# Patient Record
Sex: FEMALE | Race: WHITE | NOT HISPANIC OR LATINO | Employment: UNEMPLOYED | URBAN - METROPOLITAN AREA
[De-identification: names, ages, dates, MRNs, and addresses within clinical notes are randomized per-mention and may not be internally consistent; named-entity substitution may affect disease eponyms.]

---

## 2017-01-25 ENCOUNTER — OFFICE VISIT (OUTPATIENT)
Dept: NEUROSURGERY | Facility: CLINIC | Age: 52
End: 2017-01-25

## 2017-01-25 VITALS
SYSTOLIC BLOOD PRESSURE: 116 MMHG | DIASTOLIC BLOOD PRESSURE: 73 MMHG | BODY MASS INDEX: 52.87 KG/M2 | HEART RATE: 96 BPM | WEIGHT: 280 LBS | HEIGHT: 61 IN

## 2017-01-25 DIAGNOSIS — R20.2 NUMBNESS AND TINGLING IN LEFT ARM: ICD-10-CM

## 2017-01-25 DIAGNOSIS — M50.322 DEGENERATION OF INTERVERTEBRAL DISC AT C5-C6 LEVEL: Primary | ICD-10-CM

## 2017-01-25 DIAGNOSIS — R20.0 NUMBNESS AND TINGLING IN LEFT ARM: ICD-10-CM

## 2017-01-25 PROCEDURE — 99243 OFF/OP CNSLTJ NEW/EST LOW 30: CPT | Performed by: NEUROLOGICAL SURGERY

## 2017-01-25 NOTE — PATIENT INSTRUCTIONS
Obesidad  (Obesity)  Se considera obesidad cuando hay demasiada grasa corporal y el índice de masa corporal (IMC) es de 30 o más. El IMC es la estimación de la grasa corporal y se calcula a partir de la altura y el peso. Generalmente, es el médico el que calcula el índice de masa corporal flory las visitas de control habituales. La obesidad se produce cuando se consumen más calorías que las que se gastan realizando ejercicios o actividad física todos los consuelo. Cuando la obesidad es prolongada, puede causar enfermedades graves o emergencias, panfilo:  · Ictus.  · Cardiopatía.  · Diabetes.  · Cáncer.  · Artritis.  · Presión arterial elevada (hipertensión arterial).  · Colesterol elevado.  · Apnea del sueño.  · Disfunción eréctil.  · Problemas de infertilidad.  CAUSAS   · Pandora habitualmente alimentos poco saludables.  · Falta de actividad física.  · Ciertos trastornos, por ejemplo, derrick actividad de la tiroides (hipotiroidismo), síndrome de Cushing y síndrome de ovario poliquístico.  · Ciertos medicamentos, panfilo los corticoides, algunos antidepresivos y antipsicóticos.  · Factores genéticos.  · Falta de sueño.  DIAGNÓSTICO  El médico podrá diagnosticar obesidad después de calcular el IMC. La obesidad se diagnostica cuando el IMC es de 30 o más.  Hay otros métodos para medir el nivel de obesidad. Otros métodos consisten en medir el grosor de un pliegue de piel o la circunferencia de la cintura, y comparar la circunferencia de la cadera con la circunferencia de la cintura.  TRATAMIENTO   Un programa de tratamiento saludable incluye todos o algunos de los siguientes pasos:  · Cambios en la alimentación a aurelio plazo.  · Ejercicios y actividad física.  · Cambios en la conducta y en el estilo de syed.  · Medicamentos (solo con la supervisión de verdugo médico). Los medicamentos pueden ser útiles, kalee solamente si se usan junto con programas de dieta y ejercicio.  Si el índice de masa corporal es 40 o más, el médico puede  recomendar que se someta a julianne cirugía especializada o que participe en programas que lo ayuden a adelgazar.  Un programa poco saludable incluye:  · Ayunar.  · Dietas de moda.  · Suplementos y fármacos.  Estas elecciones no son exitosas para un control del peso a aurelio plazo.  INSTRUCCIONES PARA EL CUIDADO EN EL HOGAR  · Mary ejercicio y actividad física según las indicaciones de verdugo médico. Para aumentar la actividad física, trate lo siguiente:    Utilice las escaleras en lugar del ascensor.    Estacione lejos de la entrada de las tiendas.    Arregle el jardíndakota en bicicleta o camine en vez de mirar televisión o usar la computadora.  · Consuma alimentos y bebidas saludables, bajos en calorías, de manera habitual. Coma más frutas y verduras frescas. Utilice un recetario de alimentos de bajas calorías o tome clases de cocina saludable.  · Limite las comidas rápidas, los dulces y los snacks procesados.  · Coma porciones más pequeñas.  · Lleve un diario de todo lo que come. Hay muchas páginas web gratuitas que podrán ayudarlo. Puede ser útil que mida los alimentos de modo que pueda determinar si está consumiendo el tamaño adecuado de las porciones.  · Evite el consumo alcohol. Kalli más agua y bebidas sin calorías.  · [place] vitaminas y suplementos solamente panfilo se lo haya recomendado verdugo médico.  · También pueden ser de gran ayuda los grupos de apoyo para perder peso y la orientación de un nutricionista matriculado, un terapeuta y un programa de educación para reducir el estrés.  SOLICITE ATENCIÓN MÉDICA DE INMEDIATO SI:  · Siente dolor u opresión en el pecho.  · Siente dificultad para respirar o le falta el aire.  · Siente debilidad o adormecimiento en las piernas.  · Se siente confundido o tiene dificultad para hablar.  · Tiene cambios repentinos en la visión.     Esta información no tiene panfilo fin reemplazar el consejo del médico. Asegúrese de hacerle al médico cualquier pregunta que tenga.     Document Released:  12/18/2006 Document Revised: 01/08/2016  Elsevier Interactive Patient Education ©2016 Elsevier Inc.

## 2017-01-25 NOTE — MR AVS SNAPSHOT
Candice Swan   1/25/2017 2:00 PM   Office Visit    Dept Phone:  [phone]   Encounter #:  [id number]    Provider:  David Norton MD   Department:  [facility] NEUROSURGERY                Your Full Care Plan              Your Updated Medication List      Notice  As of 1/25/2017  4:41 PM    You have not been prescribed any medications.            You Were Diagnosed With        Codes Comments    Degeneration of intervertebral disc at C5-C6 level    -  Primary ICD-10-CM: M50.322  ICD-9-CM: 722.4     Numbness and tingling in left arm     ICD-10-CM: R20.0, R20.2  ICD-9-CM: 782.0       Instructions    Obesidad  (Obesity)  Se considera obesidad cuando hay demasiada grasa corporal y el índice de masa corporal (IMC) es de 30 o más. El IMC es la estimación de la grasa corporal y se calcula a partir de la altura y el peso. Generalmente, es el médico el que calcula el índice de masa corporal flory las visitas de control habituales. La obesidad se produce cuando se consumen más calorías que las que se gastan realizando ejercicios o actividad física todos los consuelo. Cuando la obesidad es prolongada, puede causar enfermedades graves o emergencias, panfilo:  · Ictus.  · Cardiopatía.  · Diabetes.  · Cáncer.  · Artritis.  · Presión arterial elevada (hipertensión arterial).  · Colesterol elevado.  · Apnea del sueño.  · Disfunción eréctil.  · Problemas de infertilidad.  CAUSAS   · [place] habitualmente alimentos poco saludables.  · Falta de actividad física.  · Ciertos trastornos, por ejemplo, derrick actividad de la tiroides (hipotiroidismo), síndrome de Cushing y síndrome de ovario poliquístico.  · Ciertos medicamentos, panfilo los corticoides, algunos antidepresivos y antipsicóticos.  · Factores genéticos.  · Falta de sueño.  DIAGNÓSTICO  El médico podrá diagnosticar obesidad después de calcular el IMC. La obesidad se diagnostica cuando el IMC es de 30 o más.  Hay otros métodos para medir el  nivel de obesidad. Otros métodos consisten en medir el grosor de un pliegue de piel o la circunferencia de la cintura, y comparar la circunferencia de la cadera con la circunferencia de la cintura.  TRATAMIENTO   Un programa de tratamiento saludable incluye todos o algunos de los siguientes pasos:  · Cambios en la alimentación a aurelio plazo.  · Ejercicios y actividad física.  · Cambios en la conducta y en el estilo de syed.  · Medicamentos (solo con la supervisión de verdugo médico). Los medicamentos pueden ser útiles, kalee solamente si se usan junto con programas de dieta y ejercicio.  Si el índice de masa corporal es 40 o más, el médico puede recomendar que se someta a julianne cirugía especializada o que participe en programas que lo ayuden a adelgazar.  Un programa poco saludable incluye:  · Ayunar.  · Dietas de moda.  · Suplementos y fármacos.  Estas elecciones no son exitosas para un control del peso a aurelio plazo.  INSTRUCCIONES PARA EL CUIDADO EN EL HOGAR  · Mary ejercicio y actividad física según las indicaciones de verdugo médico. Para aumentar la actividad física, trate lo siguiente:    Utilice las escaleras en lugar del ascensor.    Estacione lejos de la entrada de las tiendas.    Arregle el jardín, dakota en bicicleta o camine en vez de mirar televisión o usar la computadora.  · Consuma alimentos y bebidas saludables, bajos en calorías, de manera habitual. Coma más frutas y verduras frescas. Utilice un recetario de alimentos de bajas calorías o tome clases de cocina saludable.  · Limite las comidas rápidas, los dulces y los snacks procesados.  · Coma porciones más pequeñas.  · Lleve un diario de todo lo que come. Hay muchas páginas web gratuitas que podrán ayudarlo. Puede ser útil que mida los alimentos de modo que pueda determinar si está consumiendo el tamaño adecuado de las porciones.  · Evite el consumo alcohol. Kalli más agua y bebidas sin calorías.  · [place] vitaminas y suplementos solamente panfilo se lo haya  recomendado verdugo médico.  · También pueden ser de gran ayuda los grupos de apoyo para perder peso y la orientación de un nutricionista matriculado, un terapeuta y un programa de educación para reducir el estrés.  SOLICITE ATENCIÓN MÉDICA DE INMEDIATO SI:  · Siente dolor u opresión en el pecho.  · Siente dificultad para respirar o le falta el aire.  · Siente debilidad o adormecimiento en las piernas.  · Se siente confundido o tiene dificultad para hablar.  · Tiene cambios repentinos en la visión.     Esta información no tiene panfilo fin reemplazar el consejo del médico. Asegúrese de hacerle al médico cualquier pregunta que tenga.     Document Released: 2006 Document Revised: 2016  [employer] Interactive Patient Education © Elsevier Inc.       Patient Instructions History      Upcoming Appointments     Visit Type Date Time Department    NEW PATIENT 2017  2:00 PM [facility]      Anago Signup     [employer] allows you to send messages to your doctor, view your test results, renew your prescriptions, schedule appointments, and more. To sign up, go to [employer] and click on the Sign Up Now link in the New User? box. Enter your Anago Activation Code exactly as it appears below along with the last four digits of your Social Security Number and your Date of Birth () to complete the sign-up process. If you do not sign up before the expiration date, you must request a new code.    Anago Activation Code: [email or web address]  Expires: 2017  4:41 PM    If you have questions, you can email [email or web address] or call [phone] to talk to our Anago staff. Remember, Anago is NOT to be used for urgent needs. For medical emergencies, dial 911.               Other Info from Your Visit           Allergies     No Known Allergies      Reason for Visit     Neck Pain           Vital Signs     Blood Pressure Pulse Height Weight Body Mass Index Smoking Status     "116/73 96 61\" (154.9 cm) 280 lb (127 kg) 52.91 kg/m2 Never Smoker      Problems and Diagnoses Noted     Degeneration of intervertebral disc at C5-C6 level    Numbness and tingling in left arm        "

## 2017-01-25 NOTE — LETTER
January 25, 2017     Brandin Sunshine MD  [address] [place] [address] [address]  [place] [address]    Patient: Candice Swan   YOB: 1965   Date of Visit: 1/25/2017       Dear Dr. Bita MD:    Thank you for referring Candice Swan to me for evaluation. Below are the relevant portions of my assessment and plan of care.      Independent Review of Radiographic Studies:    Uterine new cervical MRI done recently on 8/2/16 which does show some multilevel degenerative disc disease at C5-C6 and C6-C7 with mild foraminal impingement bilaterally.      Medical Decision Making:    She is on an improving trend here this will probably continue to get better on its own.  I suggested she use over-the-counter Motrin or Aleve.  I showed her how to do a simple chin tuck and asked her to do that on a regular and daily basis.  We will keep it open-ended.  If symptoms get worse or don't get better, we can send her to physical therapy.  Her son will call us if that occurs.      Candice was seen today for neck pain.    Diagnoses and all orders for this visit:    Degeneration of intervertebral disc at C5-C6 level    Numbness and tingling in left arm    Return if symptoms worsen or fail to improve.                    If you have questions, please do not hesitate to call me. I look forward to following Candice along with you.         Sincerely,        David Norton MD        CC: No Recipients

## 2017-01-25 NOTE — LETTER
January 25, 2017     Brandin Sunshine MD  [address] [place] [address] [address]  [place] [address]    Patient: Candice Swan   YOB: 1965   Date of Visit: 1/25/2017       Dear Dr. Bita MD:    Candice Swan was in my office today. Below are the relevant portions of my assessment and plan of care.      Independent Review of Radiographic Studies:    Uterine new cervical MRI done recently on 8/2/16 which does show some multilevel degenerative disc disease at C5-C6 and C6-C7 with mild foraminal impingement bilaterally.      Medical Decision Making:    She is on an improving trend here this will probably continue to get better on its own.  I suggested she use over-the-counter Motrin or Aleve.  I showed her how to do a simple chin tuck and asked her to do that on a regular and daily basis.  We will keep it open-ended.  If symptoms get worse or don't get better, we can send her to physical therapy.  Her son will call us if that occurs.      Candice was seen today for neck pain.    Diagnoses and all orders for this visit:    Degeneration of intervertebral disc at C5-C6 level    Numbness and tingling in left arm    Return if symptoms worsen or fail to improve.            If you have questions, please do not hesitate to call me. I look forward to following Candice along with you.         Sincerely,        David Norton MD        CC: No Recipients

## 2017-01-25 NOTE — PROGRESS NOTES
Subjective   Patient ID: Candice Swan is a 51 y.o. female is being seen for consultation today at the request of Brandin Sunshine MD for evaluation of neck pain.  She presents with a family member for interpretation.    The patient notes a six month history of neck pain and left arm numbness.  She notes that around the time her symptoms began that she developed an itchy spot behind her left ear.  She notes that this feeling eventually turned into pain and started to radiate into her neck.  She notes that her pain symptoms radiate down her back and into her left leg to her foot.     She notes that she has not been treated with PT or SABRINA for her pain symptoms.     She is not currently taking any medications.    Neck Pain    This is a chronic problem. The current episode started more than 1 month ago. The problem occurs daily. The problem has been gradually worsening. The pain is associated with an unknown factor. The pain is present in the left side. The quality of the pain is described as aching. Associated symptoms include numbness and weakness.       The following portions of the patient's history were reviewed and updated as appropriate: allergies, current medications, past family history, past medical history, past social history, past surgical history and problem list.    Review of Systems   Constitutional: Positive for fatigue.   Musculoskeletal: Positive for back pain and neck pain.   Neurological: Positive for weakness and numbness.   All other systems reviewed and are negative.    The patient is a 51-year-old  woman who has about 8 month history of some neck pain and left arm numbness and tingling.  The pain started spontaneously without any significant radiating pain although there is some left scapular pain.  There is no weakness.  Her reflexes are normal.  She is not myelopathic.  We discussed the MRI through her son who is acting as a  today.  I reassured her that this is not a  dangerous condition that her symptoms are likely to continue to improve on their own.  I did show her how to do a chin tuck which is useful neck exercise.  I suggested she take Motrin or Aleve as needed.  If things worsen, she can probably benefit from some physical therapy but she is already improving so I don't think that's necessary.  In worse case scenario's injections could be tried but certainly she doesn't need that now.  She was reassured that she certainly did not need any surgery.      Objective   Physical Exam   Constitutional: She is oriented to person, place, and time. She appears well-developed and well-nourished.   HENT:   Head: Normocephalic and atraumatic.   Eyes: Conjunctivae and EOM are normal. Pupils are equal, round, and reactive to light.   Fundoscopic exam:       The right eye shows no papilledema. The right eye shows venous pulsations.        The left eye shows no papilledema. The left eye shows venous pulsations.   Neck: Carotid bruit is not present.   Neurological: She is oriented to person, place, and time. She has a normal Finger-Nose-Finger Test and a normal Heel to Shin Test. Gait normal.   Reflex Scores:       Tricep reflexes are 2+ on the right side and 2+ on the left side.       Bicep reflexes are 2+ on the right side and 2+ on the left side.       Brachioradialis reflexes are 2+ on the right side and 2+ on the left side.       Patellar reflexes are 2+ on the right side and 2+ on the left side.       Achilles reflexes are 2+ on the right side and 2+ on the left side.  Psychiatric: Her speech is normal.     Neurologic Exam     Mental Status   Oriented to person, place, and time.   Registration of memory: Good recent and remote memory.   Attention: normal. Concentration: normal.   Speech: speech is normal   Level of consciousness: alert  Knowledge: consistent with education.     Cranial Nerves     CN II   Visual fields full to confrontation.   Visual acuity: normal    CN III, IV, VI    Pupils are equal, round, and reactive to light.  Extraocular motions are normal.     CN V   Facial sensation intact.   Right corneal reflex: normal  Left corneal reflex: normal    CN VII   Facial expression full, symmetric.   Right facial weakness: none  Left facial weakness: none    CN VIII   Hearing: intact    CN IX, X   Palate: symmetric    CN XI   Right sternocleidomastoid strength: normal  Left sternocleidomastoid strength: normal    CN XII   Tongue: not atrophic  Tongue deviation: none    Motor Exam   Muscle bulk: normal  Right arm tone: normal  Left arm tone: normal  Right leg tone: normal  Left leg tone: normal    Strength   Strength 5/5 except as noted.     Sensory Exam   Light touch normal.     Gait, Coordination, and Reflexes     Gait  Gait: normal    Coordination   Finger to nose coordination: normal  Heel to shin coordination: normal    Reflexes   Right brachioradialis: 2+  Left brachioradialis: 2+  Right biceps: 2+  Left biceps: 2+  Right triceps: 2+  Left triceps: 2+  Right patellar: 2+  Left patellar: 2+  Right achilles: 2+  Left achilles: 2+  Right : 2+  Left : 2+      Assessment/Plan   Independent Review of Radiographic Studies:    Uterine new cervical MRI done recently on 8/2/16 which does show some multilevel degenerative disc disease at C5-C6 and C6-C7 with mild foraminal impingement bilaterally.      Medical Decision Making:    She is on an improving trend here this will probably continue to get better on its own.  I suggested she use over-the-counter Motrin or Aleve.  I showed her how to do a simple chin tuck and asked her to do that on a regular and daily basis.  We will keep it open-ended.  If symptoms get worse or don't get better, we can send her to physical therapy.  Her son will call us if that occurs.      Candice was seen today for neck pain.    Diagnoses and all orders for this visit:    Degeneration of intervertebral disc at C5-C6 level    Numbness and tingling in left  arm    Return if symptoms worsen or fail to improve.

## 2019-05-02 ENCOUNTER — CONVERSION ENCOUNTER (OUTPATIENT)
Dept: FAMILY MEDICINE CLINIC | Facility: CLINIC | Age: 54
End: 2019-05-02

## 2019-05-03 LAB
ALBUMIN SERPL-MCNC: 3.7 G/DL (ref 3.6–5.1)
ALP SERPL-CCNC: 101 U/L (ref 33–130)
ALT SERPL-CCNC: 24 U/L (ref 6–29)
AST SERPL-CCNC: 15 U/L (ref 10–35)
BASOPHILS # BLD AUTO: 20 CELLS/UL (ref 0–200)
BASOPHILS NFR BLD AUTO: 0.3 %
BILIRUB SERPL-MCNC: 0.6 MG/DL (ref 0.2–1.2)
BUN SERPL-MCNC: 12 MG/DL (ref 7–25)
BUN/CREAT SERPL: ABNORMAL (CALC) (ref 6–22)
CALCIUM SERPL-MCNC: 9.3 MG/DL (ref 8.6–10.4)
CHLORIDE SERPL-SCNC: 104 MMOL/L (ref 98–110)
CHOLEST SERPL-MCNC: 129 MG/DL
CHOLEST/HDLC SERPL: 2.6 (CALC)
CONV CO2: 24 MMOL/L (ref 20–32)
CONV TOTAL PROTEIN: 6.6 G/DL (ref 6.1–8.1)
CREAT UR-MCNC: 0.53 MG/DL (ref 0.5–1.05)
EOSINOPHIL # BLD AUTO: 182 CELLS/UL (ref 15–500)
EOSINOPHIL # BLD AUTO: 2.8 %
ERYTHROCYTE [DISTWIDTH] IN BLOOD BY AUTOMATED COUNT: 13.1 % (ref 11–15)
GLOBULIN UR ELPH-MCNC: 2.9 MG/DL (ref 1.9–3.7)
GLUCOSE UR QL: 115 MG/DL (ref 65–99)
HCT VFR BLD AUTO: 40.2 % (ref 35–45)
HDLC SERPL-MCNC: 49 MG/DL
HGB BLD-MCNC: 13.5 G/DL (ref 11.7–15.5)
INSULIN SERPL-ACNC: 1.3 (CALC) (ref 1–2.5)
LDLC SERPL CALC-MCNC: 62 MG/DL
LYMPHOCYTES # BLD AUTO: 1840 CELLS/UL (ref 850–3900)
LYMPHOCYTES NFR BLD AUTO: 28.3 %
MCH RBC QN AUTO: 29.9 PG (ref 27–33)
MCHC RBC AUTO-ENTMCNC: 33.6 G/DL (ref 32–36)
MCV RBC AUTO: 89.1 FL (ref 80–100)
MONOCYTES # BLD AUTO: 527 CELLS/UL (ref 200–950)
MONOCYTES NFR BLD AUTO: 8.1 %
NEUTROPHILS # BLD AUTO: 3933 CELLS/UL (ref 1500–7800)
NEUTROPHILS NFR BLD AUTO: 60.5 %
NONHDLC SERPL-MCNC: 80 MG/DL
PLATELET # BLD AUTO: 259 10*3/UL (ref 140–400)
PMV BLD AUTO: 10.4 FL (ref 7.5–12.5)
POTASSIUM SERPL-SCNC: 3.9 MMOL/L (ref 3.5–5.3)
RBC # BLD AUTO: 4.51 MILLION/UL (ref 3.8–5.1)
SODIUM SERPL-SCNC: 138 MMOL/L (ref 135–146)
TRIGL SERPL-MCNC: 98 MG/DL
TSH SERPL-ACNC: 3.47 MIU/L
WBC # BLD AUTO: 6.5 10*3/UL (ref 3.8–10.8)

## 2019-05-15 ENCOUNTER — HOSPITAL ENCOUNTER (OUTPATIENT)
Dept: OTHER | Facility: HOSPITAL | Age: 54
Discharge: HOME | End: 2019-05-15
Attending: FAMILY MEDICINE | Admitting: FAMILY MEDICINE

## 2019-06-11 ENCOUNTER — HOSPITAL ENCOUNTER (OUTPATIENT)
Dept: OTHER | Facility: HOSPITAL | Age: 54
Discharge: HOME | End: 2019-06-11
Attending: FAMILY MEDICINE | Admitting: FAMILY MEDICINE

## 2019-06-17 PROBLEM — E78.5 HYPERLIPIDEMIA: Status: ACTIVE | Noted: 2019-04-26

## 2019-06-17 PROBLEM — K42.9 UMBILICAL HERNIA: Status: ACTIVE | Noted: 2019-04-26

## 2019-06-18 ENCOUNTER — OFFICE VISIT (OUTPATIENT)
Dept: FAMILY MEDICINE CLINIC | Facility: CLINIC | Age: 54
End: 2019-06-18

## 2019-06-18 VITALS
OXYGEN SATURATION: 98 % | BODY MASS INDEX: 47.25 KG/M2 | TEMPERATURE: 97.6 F | DIASTOLIC BLOOD PRESSURE: 80 MMHG | HEIGHT: 64 IN | WEIGHT: 276.8 LBS | RESPIRATION RATE: 20 BRPM | HEART RATE: 95 BPM | SYSTOLIC BLOOD PRESSURE: 124 MMHG

## 2019-06-18 DIAGNOSIS — R94.39 ABNORMAL CARDIOVASCULAR STRESS TEST: ICD-10-CM

## 2019-06-18 DIAGNOSIS — E66.9 OBESITY WITH SERIOUS COMORBIDITY, UNSPECIFIED CLASSIFICATION, UNSPECIFIED OBESITY TYPE: ICD-10-CM

## 2019-06-18 DIAGNOSIS — M17.0 PRIMARY OSTEOARTHRITIS OF BOTH KNEES: Primary | ICD-10-CM

## 2019-06-18 DIAGNOSIS — K42.9 UMBILICAL HERNIA WITHOUT OBSTRUCTION AND WITHOUT GANGRENE: ICD-10-CM

## 2019-06-18 PROCEDURE — 20610 DRAIN/INJ JOINT/BURSA W/O US: CPT | Performed by: FAMILY MEDICINE

## 2019-06-18 PROCEDURE — 99213 OFFICE O/P EST LOW 20 MIN: CPT | Performed by: FAMILY MEDICINE

## 2019-06-18 RX ORDER — LIDOCAINE HYDROCHLORIDE 10 MG/ML
0.5 INJECTION, SOLUTION INFILTRATION; PERINEURAL
Status: DISCONTINUED | OUTPATIENT
Start: 2019-06-18 | End: 2019-06-18 | Stop reason: HOSPADM

## 2019-06-18 RX ORDER — METHYLPREDNISOLONE ACETATE 40 MG/ML
40 INJECTION, SUSPENSION INTRA-ARTICULAR; INTRALESIONAL; INTRAMUSCULAR; SOFT TISSUE
Status: DISCONTINUED | OUTPATIENT
Start: 2019-06-18 | End: 2019-06-18 | Stop reason: HOSPADM

## 2019-06-18 RX ORDER — BUPIVACAINE HYDROCHLORIDE 5 MG/ML
0.5 INJECTION, SOLUTION EPIDURAL; INTRACAUDAL
Status: DISCONTINUED | OUTPATIENT
Start: 2019-06-18 | End: 2019-06-18 | Stop reason: HOSPADM

## 2019-06-18 RX ORDER — BUPIVACAINE HYDROCHLORIDE 5 MG/ML
1 INJECTION, SOLUTION EPIDURAL; INTRACAUDAL
Status: DISCONTINUED | OUTPATIENT
Start: 2019-06-18 | End: 2019-06-18 | Stop reason: HOSPADM

## 2019-06-18 RX ORDER — METHYLPREDNISOLONE ACETATE 80 MG/ML
80 INJECTION, SUSPENSION INTRA-ARTICULAR; INTRALESIONAL; INTRAMUSCULAR; SOFT TISSUE
Status: DISCONTINUED | OUTPATIENT
Start: 2019-06-18 | End: 2019-06-18 | Stop reason: HOSPADM

## 2019-06-18 RX ADMIN — LIDOCAINE HYDROCHLORIDE 0.5 ML: 10 INJECTION, SOLUTION INFILTRATION; PERINEURAL at 10:06

## 2019-06-18 RX ADMIN — METHYLPREDNISOLONE ACETATE 80 MG: 80 INJECTION, SUSPENSION INTRA-ARTICULAR; INTRALESIONAL; INTRAMUSCULAR; SOFT TISSUE at 10:06

## 2019-06-18 RX ADMIN — BUPIVACAINE HYDROCHLORIDE 1 ML: 5 INJECTION, SOLUTION EPIDURAL; INTRACAUDAL at 10:06

## 2019-06-18 RX ADMIN — LIDOCAINE HYDROCHLORIDE 0.5 ML: 10 INJECTION, SOLUTION INFILTRATION; PERINEURAL at 09:43

## 2019-06-18 RX ADMIN — METHYLPREDNISOLONE ACETATE 40 MG: 40 INJECTION, SUSPENSION INTRA-ARTICULAR; INTRALESIONAL; INTRAMUSCULAR; SOFT TISSUE at 09:43

## 2019-06-18 RX ADMIN — BUPIVACAINE HYDROCHLORIDE 0.5 ML: 5 INJECTION, SOLUTION EPIDURAL; INTRACAUDAL at 09:43

## 2019-06-18 RX ADMIN — METHYLPREDNISOLONE ACETATE 40 MG: 40 INJECTION, SUSPENSION INTRA-ARTICULAR; INTRALESIONAL; INTRAMUSCULAR; SOFT TISSUE at 10:06

## 2019-06-18 NOTE — PROGRESS NOTES
Procedure   Arthrocentesis  Date/Time: 6/18/2019 9:43 AM  Performed by: Brandin Sunshine MD  Authorized by: Brandin Sunshine MD   Indications: pain   Body area: knee  Joint: right knee  Local anesthesia used: no    Anesthesia:  Local anesthesia used: no    Sedation:  Patient sedated: no    Preparation: Patient was prepped and draped in the usual sterile fashion.  Needle size: 22 G  Ultrasound guidance: no  Approach: medial  Comments: Bilateral inxn    Arthrocentesis  Date/Time: 6/18/2019 10:06 AM  Performed by: Brandin Sunshine MD  Authorized by: Brandin Sunshine MD   Indications: pain   Body area: knee  Joint: left knee  Local anesthesia used: no    Anesthesia:  Local anesthesia used: no    Sedation:  Patient sedated: no    Preparation: Patient was prepped and draped in the usual sterile fashion.  Needle size: 22 G  Ultrasound guidance: no  Approach: medial  Patient tolerance: Patient tolerated the procedure well with no immediate complications
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
## 2019-06-18 NOTE — ASSESSMENT & PLAN NOTE
Did not tolerate wellbutrin  Discussed diet, exercise, lifestyle modification.  Considering gastric sleeve, surgery referral upcoming

## 2019-06-18 NOTE — PROGRESS NOTES
"Subjective   Suzanne Swan is a 54 y.o. female.     Chief Complaint   Patient presents with   • Abdominal Pain     has hernia in left side. suzanne reports having this for about 2 years. she is seeing  for possible hernia removal. she had a stress test for pre op.       Abdominal Pain   This is a chronic problem. The current episode started more than 1 year ago. The onset quality is gradual. The problem occurs constantly. The pain is located in the LUQ. The pain is moderate. The quality of the pain is dull. Pertinent negatives include no nausea.            I personally reviewed and updated the patient's allergies, medications, problem list, and past medical, surgical, social, and family history.     Review of Systems   Constitutional: Negative for chills and diaphoresis.   Eyes: Negative for visual disturbance.   Respiratory: Negative for shortness of breath.    Cardiovascular: Negative for chest pain and palpitations.   Gastrointestinal: Positive for abdominal pain. Negative for nausea.   Endocrine: Negative for polydipsia and polyphagia.   Musculoskeletal: Negative for neck stiffness.   Skin: Negative for color change and pallor.   Neurological: Negative for seizures and syncope.   Hematological: Negative for adenopathy.       Objective   /80 (BP Location: Left arm, Patient Position: Sitting, Cuff Size: Adult)   Pulse 95   Temp 97.6 °F (36.4 °C)   Resp 20   Ht 162.6 cm (64\")   Wt 126 kg (276 lb 12.8 oz)   LMP 11/12/2018 (Within Days)   SpO2 98%   BMI 47.51 kg/m²   Physical Exam   Constitutional: She is oriented to person, place, and time. She appears well-developed and well-nourished.   Cardiovascular: Normal rate, regular rhythm, S1 normal, S2 normal, normal heart sounds, intact distal pulses and normal pulses. Exam reveals no gallop and no friction rub.   No murmur heard.  Pulmonary/Chest: Effort normal and breath sounds normal. No accessory muscle usage or stridor. She has no decreased " breath sounds. She has no wheezes. She has no rhonchi. She has no rales.   Abdominal: Soft. Normal appearance, normal aorta and bowel sounds are normal. She exhibits no distension, no pulsatile midline mass and no mass. There is no hepatosplenomegaly. There is no tenderness. There is no rigidity, no rebound, no guarding, no CVA tenderness and negative Aceves's sign. No hernia.   Musculoskeletal:        Right knee: Normal. She exhibits normal range of motion, no swelling, no effusion, no deformity, no erythema, no LCL laxity, normal patellar mobility and no MCL laxity. No tenderness found. No medial joint line, no lateral joint line, no MCL, no LCL and no patellar tendon tenderness noted.        Left knee: Normal. She exhibits normal range of motion, no swelling, no effusion, no deformity, no erythema, no LCL laxity, normal patellar mobility and no MCL laxity. No tenderness found. No medial joint line, no lateral joint line, no MCL, no LCL and no patellar tendon tenderness noted.   Anterior drawer and Lachman neg, Tomer neg   Neurological: She is alert and oriented to person, place, and time. Coordination and gait normal.   Skin: Skin is warm and dry. Turgor is normal. She is not diaphoretic. No pallor.         Assessment/Plan   Problems Addressed this Visit        Cardiovascular and Mediastinum    Abnormal cardiovascular stress test     Cardiology ref sched            Digestive    Obesity     Did not tolerate wellbutrin  Discussed diet, exercise, lifestyle modification.  Considering gastric sleeve, surgery referral upcoming            Musculoskeletal and Integument    Osteoarthritis of both knees - Primary     Rest joint for 48 hours, ice 20 minutes twice daily, discussed rehab exercises, discussed warning signs of infection or post-injection flare, call or return if persistent symptoms.             Other    Umbilical hernia     Recommend repair, surgery referrall upcoming  Abnormal stress test cardiology ref  sched  Signs and symptoms of strangulation discussed.                 Expected course, medications, and adverse effects discussed.  Call or return if worsening or persistent symptoms.

## 2019-06-18 NOTE — ASSESSMENT & PLAN NOTE
Rest joint for 48 hours, ice 20 minutes twice daily, discussed rehab exercises, discussed warning signs of infection or post-injection flare, call or return if persistent symptoms.

## 2019-07-24 ENCOUNTER — OFFICE VISIT (OUTPATIENT)
Dept: CARDIOLOGY | Facility: CLINIC | Age: 54
End: 2019-07-24

## 2019-07-24 VITALS
BODY MASS INDEX: 46.32 KG/M2 | DIASTOLIC BLOOD PRESSURE: 84 MMHG | HEART RATE: 85 BPM | SYSTOLIC BLOOD PRESSURE: 120 MMHG | WEIGHT: 278 LBS | HEIGHT: 65 IN

## 2019-07-24 DIAGNOSIS — R53.83 FATIGUE, UNSPECIFIED TYPE: ICD-10-CM

## 2019-07-24 DIAGNOSIS — R94.39 ABNORMAL NUCLEAR STRESS TEST: Primary | ICD-10-CM

## 2019-07-24 PROCEDURE — 93000 ELECTROCARDIOGRAM COMPLETE: CPT | Performed by: INTERNAL MEDICINE

## 2019-07-24 PROCEDURE — 99204 OFFICE O/P NEW MOD 45 MIN: CPT | Performed by: INTERNAL MEDICINE

## 2019-07-24 NOTE — PROGRESS NOTES
Date of Office Visit: 2019  Encounter Provider: Jose Enrique Perez MD  Place of Service: [facility] CARDIOLOGY [place]  Patient Name: Candice Swan  :1965  Brandin Sunshine MD    Chief Complaint   Patient presents with   • Advice Only     abnormal stress test  Preoperative assessment  Shortness of breath     History of Present Illness    I am pleased to see Mrs. Swan in my office today as a new consultation.    As you know, patient is 54 years old  female whose past medical history is insignificant for any medical illness except morbid obesity who came today for abnormal stress test.    Recently patient is being considered for possible hernial surgery but because of her weight it is deferred.  Patient underwent stress test as a part of preoperative assessment.  Stress test showed large inferior/lateral myocardial infarction but no ischemia.  She walked for total of 8 minutes.  No chest pain was reported.    Patient denies any symptom of chest pain or tightness or heaviness.  Patient does have shortness of breath.  Patient is not very active.  Patient denies any orthopnea, PND, syncope or presyncope.  No leg edema noted.  No palpitation.    EKG showed normal sinus rhythm    Stress test showed fixed inferior/lateral defect possible myocardial infarction.  I doubt that patient probably had myocardial infarction possibility does exist.  However it may be due to morbid obesity and body habitus.  I think it is due to attenuation artifact.  I would recommend to proceed with echocardiogram to assess the wall motion abnormality.  If this is normal I would recommend observation    History reviewed. No pertinent past medical history.      Past Surgical History:   Procedure Laterality Date   • CARDIOVASCULAR STRESS TEST  2019    abnormal   • CHOLECYSTECTOMY           No current outpatient medications on file.      Social History     Socioeconomic History   • Marital status:      Spouse  "name: Not on file   • Number of children: 3   • Years of education: 6TH GRADE   • Highest education level: Not on file   Occupational History   • Occupation: UNEMPLOYED   Tobacco Use   • Smoking status: Never Smoker   • Smokeless tobacco: Never Used   Substance and Sexual Activity   • Alcohol use: No     Frequency: Never   • Drug use: No   • Sexual activity: Defer         Review of Systems   Constitution: Negative for chills and fever.   HENT: Negative for ear discharge and nosebleeds.    Eyes: Negative for discharge and redness.   Cardiovascular: Negative for chest pain, orthopnea, palpitations, paroxysmal nocturnal dyspnea and syncope.   Respiratory: Positive for shortness of breath. Negative for cough and wheezing.    Endocrine: Negative for heat intolerance.   Skin: Negative for rash.   Musculoskeletal: Negative for arthritis and myalgias.   Gastrointestinal: Negative for abdominal pain, melena, nausea and vomiting.   Genitourinary: Negative for dysuria and hematuria.   Neurological: Negative for dizziness, light-headedness, numbness and tremors.   Psychiatric/Behavioral: Negative for depression. The patient is not nervous/anxious.        Procedures      ECG 12 Lead  Date/Time: 7/24/2019 11:27 AM  Performed by: Jose Enrique Perez MD  Authorized by: Jose Enrique Perez MD   Rhythm: sinus rhythm  Conduction: conduction normal  ST Segments: ST segments normal  T Waves: T waves normal    Clinical impression: normal ECG            ECG 12 Lead    (Results Pending)           Objective:    /84   Pulse 85   Ht 165.1 cm (65\")   Wt 126 kg (278 lb)   BMI 46.26 kg/m²         Physical Exam   Constitutional: She is oriented to person, place, and time. She appears well-developed and well-nourished.   Morbid obesity noted   HENT:   Head: Normocephalic and atraumatic.   Eyes: Right eye exhibits no discharge. No scleral icterus.   Neck: No thyromegaly present.   Cardiovascular: Normal rate, regular rhythm and normal heart sounds. " Exam reveals no gallop and no friction rub.   No murmur heard.  Pulmonary/Chest: Effort normal and breath sounds normal. No respiratory distress. She has no wheezes. She has no rales.   Abdominal: There is no tenderness.   Musculoskeletal: She exhibits no edema.   Lymphadenopathy:     She has no cervical adenopathy.   Neurological: She is alert and oriented to person, place, and time.   Skin: No rash noted. No erythema.   Psychiatric: She has a normal mood and affect.           Assessment:       Diagnosis Plan   1. Abnormal nuclear stress test  ECG 12 Lead    Adult Transthoracic Echo Complete W/ Cont if Necessary Per Protocol   2. Fatigue, unspecified type  ECG 12 Lead    Adult Transthoracic Echo Complete W/ Cont if Necessary Per Protocol            Plan:       I would recommend that patient should proceed with echocardiogram if this echocardiogarm showed no wall motion abnormality I would recommend observation and patient can proceed with surgery as planned.  Continue current treatment.

## 2019-08-09 ENCOUNTER — OFFICE VISIT (OUTPATIENT)
Dept: SURGERY | Facility: CLINIC | Age: 54
End: 2019-08-09

## 2019-08-09 VITALS
HEIGHT: 60 IN | BODY MASS INDEX: 55.05 KG/M2 | HEART RATE: 77 BPM | SYSTOLIC BLOOD PRESSURE: 138 MMHG | DIASTOLIC BLOOD PRESSURE: 87 MMHG | OXYGEN SATURATION: 97 % | WEIGHT: 280.4 LBS | TEMPERATURE: 97.4 F

## 2019-08-09 DIAGNOSIS — K42.9 UMBILICAL HERNIA WITHOUT OBSTRUCTION AND WITHOUT GANGRENE: Primary | ICD-10-CM

## 2019-08-09 PROCEDURE — 99213 OFFICE O/P EST LOW 20 MIN: CPT | Performed by: SURGERY

## 2019-08-09 NOTE — PROGRESS NOTES
"Subjective   Candice Swan is a 54 y.o. female.   Chief Complaint   Patient presents with   • Hernia     Umbilical     /87 (BP Location: Left arm, Patient Position: Sitting, Cuff Size: Adult)   Pulse 77   Temp 97.4 °F (36.3 °C) (Oral)   Ht 152.4 cm (60\")   Wt 127 kg (280 lb 6.4 oz)   SpO2 97%   BMI 54.76 kg/m²     HISTORY OF PRESENT ILLNESS:  54-year-old lady with a known umbilical hernia.  I met her in April 2019.  Today she is here for 3-month follow-up.  She says of the last several months she has had minimal symptoms from her umbilical hernia.  She says she has some dull intermittent pain especially when she lifts heavy objects.  It resolved spontaneously.  She denies any significant persistent nausea or vomiting denies any significant consultation right the patient says she is tolerating diet and having regular bowel function.  She says that she has tried to eat fewer tortillas to try to lose weight but she is heavier today than she was 3 months ago.      No outpatient encounter medications on file as of 8/9/2019.     No facility-administered encounter medications on file as of 8/9/2019.          The following portions of the patient's history were reviewed and updated as appropriate: allergies, current medications, past family history, past medical history, past social history, past surgical history and problem list.    Review of Systems    Objective   Physical Exam   Constitutional: She appears well-developed and well-nourished.   Obese   HENT:   Head: Normocephalic and atraumatic.   Eyes: Conjunctivae and EOM are normal.   Cardiovascular: Normal rate.   Pulmonary/Chest: Effort normal. No stridor. No respiratory distress.   Abdominal: Soft. She exhibits no distension. There is no tenderness. There is no guarding.   Incarcerated umbilical hernia with some mild discoloration of the skin no significant tenderness to palpation rebound or guarding.    Musculoskeletal: She exhibits no edema or " deformity.   Neurological: She is alert. No cranial nerve deficit.   Skin: Skin is warm and dry.   Psychiatric: She has a normal mood and affect. Her behavior is normal.         Assessment/Plan   aCndice was seen today for hernia.    Diagnoses and all orders for this visit:    Umbilical hernia without obstruction and without gangrene    I have counseled her once again with the aid of .  We talked about what is likely a incarcerated umbilical hernia containing omentum since he is not having any symptoms of obstruction or cramping abdominal pain.  This has not been imaged to my knowledge I counseled her that unless her symptoms are progressing and we are considering surgery we need to do imaging.  I will see her counseled her about weight loss including strategies to try to lose weight and increase her activity.  Her BMI based on our vitals today is 54.8.  I have given her a weight loss goal of 20 pounds in the next 3 to 4 months.  She understands is willing to try.  I have given her the option of calling me for any acute or worsening issues or come back scheduled to continue to discuss her hernia and she does wish to come back in December.  I have counseled her about the reasons to have emergency department for acute evaluation include severe worsening pain significant change in the size of hernia or bowel obstruction symptoms.    Jon Diop MD  8/9/2019  9:34 AM

## 2019-08-15 ENCOUNTER — HOSPITAL ENCOUNTER (OUTPATIENT)
Dept: CARDIOLOGY | Facility: HOSPITAL | Age: 54
Discharge: HOME | End: 2019-08-15
Admitting: INTERNAL MEDICINE

## 2019-08-15 VITALS — WEIGHT: 279.98 LBS | BODY MASS INDEX: 54.97 KG/M2 | HEIGHT: 60 IN

## 2019-08-15 DIAGNOSIS — R53.83 FATIGUE, UNSPECIFIED TYPE: ICD-10-CM

## 2019-08-15 DIAGNOSIS — R94.39 ABNORMAL NUCLEAR STRESS TEST: ICD-10-CM

## 2019-08-15 PROCEDURE — 93306 TTE W/DOPPLER COMPLETE: CPT

## 2019-08-15 PROCEDURE — 93306 TTE W/DOPPLER COMPLETE: CPT | Performed by: INTERNAL MEDICINE

## 2019-08-19 ENCOUNTER — OFFICE VISIT (OUTPATIENT)
Dept: FAMILY MEDICINE CLINIC | Facility: CLINIC | Age: 54
End: 2019-08-19

## 2019-08-19 VITALS
RESPIRATION RATE: 20 BRPM | HEIGHT: 60 IN | HEART RATE: 84 BPM | TEMPERATURE: 96 F | OXYGEN SATURATION: 97 % | WEIGHT: 280.6 LBS | DIASTOLIC BLOOD PRESSURE: 80 MMHG | BODY MASS INDEX: 55.09 KG/M2 | SYSTOLIC BLOOD PRESSURE: 124 MMHG

## 2019-08-19 DIAGNOSIS — K42.9 UMBILICAL HERNIA WITHOUT OBSTRUCTION AND WITHOUT GANGRENE: Primary | ICD-10-CM

## 2019-08-19 DIAGNOSIS — G24.5 SPASM EYELID: ICD-10-CM

## 2019-08-19 DIAGNOSIS — R94.39 ABNORMAL NUCLEAR STRESS TEST: ICD-10-CM

## 2019-08-19 DIAGNOSIS — E66.9 OBESITY WITH SERIOUS COMORBIDITY, UNSPECIFIED CLASSIFICATION, UNSPECIFIED OBESITY TYPE: ICD-10-CM

## 2019-08-19 DIAGNOSIS — E78.2 MIXED HYPERLIPIDEMIA: ICD-10-CM

## 2019-08-19 DIAGNOSIS — K21.9 GASTROESOPHAGEAL REFLUX DISEASE, ESOPHAGITIS PRESENCE NOT SPECIFIED: ICD-10-CM

## 2019-08-19 LAB — H PYLORI IGG SER IA-ACNC: NEGATIVE

## 2019-08-19 PROCEDURE — 86318 IA INFECTIOUS AGENT ANTIBODY: CPT | Performed by: FAMILY MEDICINE

## 2019-08-19 PROCEDURE — 99214 OFFICE O/P EST MOD 30 MIN: CPT | Performed by: FAMILY MEDICINE

## 2019-08-19 RX ORDER — CYCLOBENZAPRINE HCL 10 MG
5-10 TABLET ORAL NIGHTLY PRN
Qty: 30 TABLET | Refills: 2 | Status: SHIPPED | OUTPATIENT
Start: 2019-08-19 | End: 2020-01-24

## 2019-08-19 RX ORDER — OMEPRAZOLE 40 MG/1
40 CAPSULE, DELAYED RELEASE ORAL DAILY
Qty: 30 CAPSULE | Refills: 12 | Status: SHIPPED | OUTPATIENT
Start: 2019-08-19 | End: 2020-01-24

## 2019-08-19 NOTE — PROGRESS NOTES
Subjective   Candice Swan is a 54 y.o. female.     Chief Complaint   Patient presents with   • Knee Pain     chronic   • Abdominal Pain     seeing  for hernia surgery on hold for abnormal stress test   • Heartburn     reccurent   • Follow-up     had echo on 7/24/2019       Knee Pain    The incident occurred more than 1 week ago. There was no injury mechanism. The pain is present in the left leg and right knee. The pain is mild. The pain has been constant since onset. Treatments tried: the knee injections from 6/2019 were very helpful. The treatment provided significant relief.   Abdominal Pain   This is a recurrent problem. The current episode started more than 1 year ago. The onset quality is gradual. The problem occurs constantly. The problem has been unchanged. The pain is at a severity of 8/10. The pain is moderate. The quality of the pain is aching, cramping and sharp. Associated symptoms include belching and nausea. Her past medical history is significant for GERD.   Heartburn   She complains of abdominal pain, belching and nausea. She reports no chest pain. This is a recurrent problem. The current episode started more than 1 year ago. The problem occurs constantly. The problem has been gradually worsening.            I personally reviewed and updated the patient's allergies, medications, problem list, and past medical, surgical, social, and family history.     Family History   Problem Relation Age of Onset   • Diabetes Mother    • Heart disease Mother        Social History     Tobacco Use   • Smoking status: Never Smoker   • Smokeless tobacco: Never Used   Substance Use Topics   • Alcohol use: No     Frequency: Never   • Drug use: No       Past Surgical History:   Procedure Laterality Date   • CARDIOVASCULAR STRESS TEST  06/11/2019    abnormal   • CHOLECYSTECTOMY         Patient Active Problem List   Diagnosis   • Degeneration of intervertebral disc at C5-C6 level   • Numbness and tingling in left  "arm   • Hyperlipidemia   • Umbilical hernia   • Osteoarthritis of both knees   • Obesity   • Abnormal nuclear stress test   • Fatigue   • Allergic rhinitis   • Fatty liver   • Mixed hyperlipidemia   • Empty sella (CMS/HCC)   • Cervical disc disease   • Lipoma   • GERD (gastroesophageal reflux disease)   • Spasm eyelid         Current Outpatient Medications:   •  cyclobenzaprine (FLEXERIL) 10 MG tablet, Take 0.5-1 tablets by mouth At Night As Needed for Muscle Spasms., Disp: 30 tablet, Rfl: 2  •  omeprazole (priLOSEC) 40 MG capsule, Take 1 capsule by mouth Daily., Disp: 30 capsule, Rfl: 12         Review of Systems   Constitutional: Negative for chills and diaphoresis.   Eyes: Negative for visual disturbance.   Respiratory: Negative for shortness of breath.    Cardiovascular: Negative for chest pain and palpitations.   Gastrointestinal: Positive for abdominal pain and nausea.   Endocrine: Negative for polydipsia and polyphagia.   Musculoskeletal: Negative for neck stiffness.   Skin: Negative for color change and pallor.   Neurological: Negative for seizures and syncope.   Hematological: Negative for adenopathy.       Objective   /80   Pulse 84   Temp 96 °F (35.6 °C)   Resp 20   Ht 152.4 cm (60\")   Wt 127 kg (280 lb 9.6 oz)   LMP 08/01/2019 (Exact Date)   SpO2 97%   BMI 54.80 kg/m²   Wt Readings from Last 3 Encounters:   08/19/19 127 kg (280 lb 9.6 oz)   08/15/19 127 kg (279 lb 15.8 oz)   08/09/19 127 kg (280 lb 6.4 oz)     Physical Exam   Constitutional: She is oriented to person, place, and time. She appears well-developed and well-nourished.   Cardiovascular: Normal rate, regular rhythm, S1 normal, S2 normal, normal heart sounds, intact distal pulses and normal pulses. Exam reveals no gallop and no friction rub.   No murmur heard.  Pulmonary/Chest: Effort normal and breath sounds normal. No accessory muscle usage or stridor. She has no decreased breath sounds. She has no wheezes. She has no rhonchi. " She has no rales.   Abdominal: Soft. Normal appearance, normal aorta and bowel sounds are normal. She exhibits no distension, no pulsatile midline mass and no mass. There is no hepatosplenomegaly. There is no tenderness. There is no rigidity, no rebound, no guarding, no CVA tenderness and negative Aceves's sign. A hernia (nontender, reducible) is present.   Musculoskeletal:        Right knee: Normal. She exhibits normal range of motion, no swelling, no effusion, no deformity, no erythema, no LCL laxity, normal patellar mobility and no MCL laxity. No tenderness found. No medial joint line, no lateral joint line, no MCL, no LCL and no patellar tendon tenderness noted.        Left knee: Normal. She exhibits normal range of motion, no swelling, no effusion, no deformity, no erythema, no LCL laxity, normal patellar mobility and no MCL laxity. No tenderness found. No medial joint line, no lateral joint line, no MCL, no LCL and no patellar tendon tenderness noted.   Anterior drawer and Lachman neg, Tomer neg   Neurological: She is alert and oriented to person, place, and time. Coordination and gait normal.   Skin: Skin is warm and dry. Turgor is normal. She is not diaphoretic. No pallor.         Assessment/Plan   Problem List Items Addressed This Visit        Cardiovascular and Mediastinum    Abnormal nuclear stress test    Overview     Followed by Chris, possible false positive, echocardiogram pending         Mixed hyperlipidemia       Digestive    Obesity    Overview     Discussed diet, exercise, lifestyle modification.  Did not tolerate Wellbutrin.  Recommend sleep study she declines.  Recommend HMR program/bariatric surgery referral she declines.  Follow-up recheck.         GERD (gastroesophageal reflux disease)    Overview     Discussed diet, exercise, lifestyle modification.  Start PPI.  Follow-up recheck.         Relevant Medications    omeprazole (priLOSEC) 40 MG capsule    Other Relevant Orders    POCT H. pylori  antibodies (Completed)       Nervous and Auditory    Spasm eyelid    Relevant Medications    cyclobenzaprine (FLEXERIL) 10 MG tablet       Other    Umbilical hernia - Primary    Overview     Followed by Jadon, attempting weight loss, considering repair.  Signs and symptoms of incarceration discussed.  Call or return if worsening symptoms.  Follow-up recheck.                   Expected course, medications, and adverse effects discussed.  Call or return if worsening or persistent symptoms.

## 2019-08-22 ENCOUNTER — TELEPHONE (OUTPATIENT)
Dept: FAMILY MEDICINE CLINIC | Facility: CLINIC | Age: 54
End: 2019-08-22

## 2019-08-23 LAB
BH CV ECHO MEAS - ACS: 2.1 CM
BH CV ECHO MEAS - AO MAX PG (FULL): 1.6 MMHG
BH CV ECHO MEAS - AO MAX PG: 5.9 MMHG
BH CV ECHO MEAS - AO MEAN PG (FULL): 1.3 MMHG
BH CV ECHO MEAS - AO MEAN PG: 3.3 MMHG
BH CV ECHO MEAS - AO ROOT AREA (BSA CORRECTED): 1.5
BH CV ECHO MEAS - AO ROOT AREA: 7.8 CM^2
BH CV ECHO MEAS - AO ROOT DIAM: 3.2 CM
BH CV ECHO MEAS - AO V2 MAX: 121.6 CM/SEC
BH CV ECHO MEAS - AO V2 MEAN: 85.1 CM/SEC
BH CV ECHO MEAS - AO V2 VTI: 27.9 CM
BH CV ECHO MEAS - AVA(I,A): 2.5 CM^2
BH CV ECHO MEAS - AVA(I,D): 2.5 CM^2
BH CV ECHO MEAS - AVA(V,A): 2.6 CM^2
BH CV ECHO MEAS - AVA(V,D): 2.6 CM^2
BH CV ECHO MEAS - BSA(HAYCOCK): 2.4 M^2
BH CV ECHO MEAS - BSA: 2.2 M^2
BH CV ECHO MEAS - BZI_BMI: 54.5 KILOGRAMS/M^2
BH CV ECHO MEAS - BZI_METRIC_HEIGHT: 152.4 CM
BH CV ECHO MEAS - BZI_METRIC_WEIGHT: 126.6 KG
BH CV ECHO MEAS - EDV(CUBED): 91.6 ML
BH CV ECHO MEAS - EDV(MOD-SP4): 68.2 ML
BH CV ECHO MEAS - EDV(TEICH): 92.8 ML
BH CV ECHO MEAS - EF(CUBED): 64.3 %
BH CV ECHO MEAS - EF(MOD-BP): 55 %
BH CV ECHO MEAS - EF(MOD-SP4): 54.4 %
BH CV ECHO MEAS - EF(TEICH): 56 %
BH CV ECHO MEAS - ESV(CUBED): 32.7 ML
BH CV ECHO MEAS - ESV(MOD-SP4): 31.1 ML
BH CV ECHO MEAS - ESV(TEICH): 40.9 ML
BH CV ECHO MEAS - FS: 29.1 %
BH CV ECHO MEAS - IVS/LVPW: 1.1
BH CV ECHO MEAS - IVSD: 1.1 CM
BH CV ECHO MEAS - LA DIMENSION: 3.3 CM
BH CV ECHO MEAS - LA/AO: 1
BH CV ECHO MEAS - LV DIASTOLIC VOL/BSA (35-75): 31.7 ML/M^2
BH CV ECHO MEAS - LV MASS(C)D: 172.9 GRAMS
BH CV ECHO MEAS - LV MASS(C)DI: 80.4 GRAMS/M^2
BH CV ECHO MEAS - LV MAX PG: 4.3 MMHG
BH CV ECHO MEAS - LV MEAN PG: 1.9 MMHG
BH CV ECHO MEAS - LV SYSTOLIC VOL/BSA (12-30): 14.5 ML/M^2
BH CV ECHO MEAS - LV V1 MAX: 103.5 CM/SEC
BH CV ECHO MEAS - LV V1 MEAN: 63.5 CM/SEC
BH CV ECHO MEAS - LV V1 VTI: 22.6 CM
BH CV ECHO MEAS - LVIDD: 4.5 CM
BH CV ECHO MEAS - LVIDS: 3.2 CM
BH CV ECHO MEAS - LVOT AREA: 3 CM^2
BH CV ECHO MEAS - LVOT DIAM: 2 CM
BH CV ECHO MEAS - LVPWD: 1.1 CM
BH CV ECHO MEAS - MR MAX PG: 82.2 MMHG
BH CV ECHO MEAS - MR MAX VEL: 450.7 CM/SEC
BH CV ECHO MEAS - MV A MAX VEL: 70.9 CM/SEC
BH CV ECHO MEAS - MV E MAX VEL: 80.1 CM/SEC
BH CV ECHO MEAS - MV E/A: 1.1
BH CV ECHO MEAS - MV MAX PG: 3.5 MMHG
BH CV ECHO MEAS - MV MEAN PG: 1.8 MMHG
BH CV ECHO MEAS - MV V2 MAX: 94.1 CM/SEC
BH CV ECHO MEAS - MV V2 MEAN: 63.2 CM/SEC
BH CV ECHO MEAS - MV V2 VTI: 25.1 CM
BH CV ECHO MEAS - MVA(VTI): 2.8 CM^2
BH CV ECHO MEAS - PA ACC TIME: 0.11 SEC
BH CV ECHO MEAS - PA MAX PG: 7.2 MMHG
BH CV ECHO MEAS - PA PR(ACCEL): 27.6 MMHG
BH CV ECHO MEAS - PA V2 MAX: 134.1 CM/SEC
BH CV ECHO MEAS - PI END-D VEL: 86.6 CM/SEC
BH CV ECHO MEAS - RAP SYSTOLE: 10 MMHG
BH CV ECHO MEAS - RVDD(2D): 1.8 CM
BH CV ECHO MEAS - RVDD: 1.8 CM
BH CV ECHO MEAS - RVSP: 39.3 MMHG
BH CV ECHO MEAS - SI(AO): 101.6 ML/M^2
BH CV ECHO MEAS - SI(CUBED): 27.4 ML/M^2
BH CV ECHO MEAS - SI(LVOT): 32.1 ML/M^2
BH CV ECHO MEAS - SI(MOD-SP4): 17.3 ML/M^2
BH CV ECHO MEAS - SI(TEICH): 24.1 ML/M^2
BH CV ECHO MEAS - SV(AO): 218.4 ML
BH CV ECHO MEAS - SV(CUBED): 58.9 ML
BH CV ECHO MEAS - SV(LVOT): 69 ML
BH CV ECHO MEAS - SV(MOD-SP4): 37.1 ML
BH CV ECHO MEAS - SV(TEICH): 51.9 ML
BH CV ECHO MEAS - TR MAX VEL: 262.3 CM/SEC
MAXIMAL PREDICTED HEART RATE: 167 BPM
STRESS TARGET HR: 142 BPM

## 2019-08-23 RX ORDER — MELOXICAM 15 MG/1
15 TABLET ORAL DAILY
Qty: 30 TABLET | Refills: 5 | Status: SHIPPED | OUTPATIENT
Start: 2019-08-23 | End: 2020-01-24

## 2019-08-27 ENCOUNTER — TELEPHONE (OUTPATIENT)
Dept: CARDIOLOGY | Facility: CLINIC | Age: 54
End: 2019-08-27

## 2020-01-20 PROBLEM — E78.2 MIXED HYPERLIPIDEMIA: Status: ACTIVE | Noted: 2019-04-26

## 2020-01-24 ENCOUNTER — OFFICE VISIT (OUTPATIENT)
Dept: CARDIOLOGY | Facility: CLINIC | Age: 55
End: 2020-01-24

## 2020-01-24 VITALS
HEIGHT: 60 IN | SYSTOLIC BLOOD PRESSURE: 115 MMHG | DIASTOLIC BLOOD PRESSURE: 78 MMHG | HEART RATE: 78 BPM | BODY MASS INDEX: 55.56 KG/M2 | WEIGHT: 283 LBS

## 2020-01-24 DIAGNOSIS — R06.02 SOB (SHORTNESS OF BREATH): ICD-10-CM

## 2020-01-24 DIAGNOSIS — E78.2 MIXED HYPERLIPIDEMIA: ICD-10-CM

## 2020-01-24 DIAGNOSIS — R94.39 ABNORMAL NUCLEAR STRESS TEST: Primary | ICD-10-CM

## 2020-01-24 PROCEDURE — 93000 ELECTROCARDIOGRAM COMPLETE: CPT | Performed by: INTERNAL MEDICINE

## 2020-01-24 PROCEDURE — 99213 OFFICE O/P EST LOW 20 MIN: CPT | Performed by: INTERNAL MEDICINE

## 2020-01-24 NOTE — PROGRESS NOTES
Date of Office Visit: 2020  Encounter Provider: Dr. Jose Enrique Perez  Place of Service: [facility] [place]  Patient Name: Candice Swan  :1965  Brandin Sunshine MD    Chief Complaint   Patient presents with   • Hyperlipidemia     6 month follow up echo   • Shortness of Breath  Abnormal stress test     History of Present Illness    I am pleased to see Mrs. Swan in my office today as a follow-up    As you know, patient is 54 years old  female whose past medical history is insignificant for any medical illness except morbid obesity who came today for follow-up of echocardiogram and abnormal stress test.    Recently patient is being considered for possible hernial surgery but because of her weight it is deferred.  Patient underwent stress test as a part of preoperative assessment.  Stress test showed large inferior/lateral myocardial infarction but no ischemia.  She walked for total of 8 minutes.  No chest pain was reported.    In 2019, patient was referred to me for abnormal stress test which showed inferior and lateral wall myocardial infarction or fixed defect.  Echocardiogram showed no wall motion abnormality and EF was 55 to 60%.  No significant valvular heart disease noted.    Since the previous visit, patient is overall doing well.  She did not go for hernia surgery.  She is was advised to lose weight.  She has been trying but no significant weight loss has happened.  Patient still have mild shortness of breath.  She denies any chest pain.  No orthopnea PND.    EKG showed normal sinus rhythm.  No new changes.    At this stage, I would recommend to increase aerobic activity.  Diet modification is recommended.  Weight loss is emphasized.  No further cardiac work-up is needed.  If patient is being considered for surgery in the near future, patient is cleared for that.    Past Medical History:   Diagnosis Date   • Allergic rhinitis    • Cervical disc disease    • Empty sella  "(CMS/HCC)    • Fatty liver    • Lipoma    • Mixed hyperlipidemia    • SOB (shortness of breath)    • Umbilical hernia          Past Surgical History:   Procedure Laterality Date   • CARDIOVASCULAR STRESS TEST  06/11/2019    abnormal   • CHOLECYSTECTOMY     • ECHO - CONVERTED  2019         No current outpatient medications on file.      Social History     Socioeconomic History   • Marital status:      Spouse name: Not on file   • Number of children: 3   • Years of education: 6TH GRADE   • Highest education level: Not on file   Occupational History   • Occupation: UNEMPLOYED   Tobacco Use   • Smoking status: Never Smoker   • Smokeless tobacco: Never Used   Substance and Sexual Activity   • Alcohol use: No     Frequency: Never   • Drug use: No   • Sexual activity: Defer         Review of Systems   Constitution: Negative for chills and fever.   HENT: Negative for ear discharge and nosebleeds.    Eyes: Negative for discharge and redness.   Cardiovascular: Negative for chest pain, orthopnea, palpitations, paroxysmal nocturnal dyspnea and syncope.   Respiratory: Positive for shortness of breath. Negative for cough and wheezing.    Endocrine: Negative for heat intolerance.   Skin: Negative for rash.   Musculoskeletal: Positive for arthritis. Negative for myalgias.   Gastrointestinal: Negative for abdominal pain, melena, nausea and vomiting.   Genitourinary: Negative for dysuria and hematuria.   Neurological: Negative for dizziness, light-headedness, numbness and tremors.   Psychiatric/Behavioral: Negative for depression. The patient is not nervous/anxious.        Procedures      ECG 12 Lead  Date/Time: 1/24/2020 9:29 AM  Performed by: Jose Enrique Perez MD  Authorized by: Jose Enrique Perez MD   Comparison: compared with previous ECG   Similar to previous ECG  Rhythm: sinus rhythm    Clinical impression: normal ECG            ECG 12 Lead    (Results Pending)           Objective:    /78   Pulse 78   Ht 152.4 cm (60\")   " Wt 128 kg (283 lb)   BMI 55.27 kg/m²         Physical Exam   Constitutional: She is oriented to person, place, and time. She appears well-developed and well-nourished.   HENT:   Head: Normocephalic and atraumatic.   Eyes: No scleral icterus.   Neck: No thyromegaly present.   Cardiovascular: Normal rate, regular rhythm and normal heart sounds. Exam reveals no gallop and no friction rub.   No murmur heard.  Pulmonary/Chest: Effort normal and breath sounds normal. No respiratory distress. She has no wheezes. She has no rales.   Abdominal: There is no tenderness.   Musculoskeletal: She exhibits no edema.   Lymphadenopathy:     She has no cervical adenopathy.   Neurological: She is alert and oriented to person, place, and time.   Skin: No rash noted. No erythema.   Psychiatric: She has a normal mood and affect.           Assessment:       Diagnosis Plan   1. Abnormal nuclear stress test  ECG 12 Lead   2. Mixed hyperlipidemia  ECG 12 Lead   3. SOB (shortness of breath)  ECG 12 Lead            Plan:       At this stage, patient is doing fairly well from cardiac standpoint.  Blood pressure is fairly within desirable range.  Echocardiogram showed no wall motion abnormalities suggestive of his myocardial infarction.  I would recommend weight loss and diet modification.  Aerobic activity is recommended.  I will see the patient as needed

## 2020-01-28 ENCOUNTER — OFFICE VISIT (OUTPATIENT)
Dept: SURGERY | Facility: CLINIC | Age: 55
End: 2020-01-28

## 2020-01-28 VITALS
SYSTOLIC BLOOD PRESSURE: 129 MMHG | DIASTOLIC BLOOD PRESSURE: 76 MMHG | OXYGEN SATURATION: 100 % | WEIGHT: 280.2 LBS | HEIGHT: 61 IN | TEMPERATURE: 97.2 F | HEART RATE: 83 BPM | BODY MASS INDEX: 52.9 KG/M2

## 2020-01-28 DIAGNOSIS — K42.9 UMBILICAL HERNIA WITHOUT OBSTRUCTION AND WITHOUT GANGRENE: Primary | ICD-10-CM

## 2020-01-28 PROCEDURE — 99213 OFFICE O/P EST LOW 20 MIN: CPT | Performed by: SURGERY

## 2020-01-28 NOTE — PROGRESS NOTES
"Subjective   Candice Swan is a 54 y.o. female.   54-year-old lady who is here for follow-up with regards to a minimally symptomatic umbilical hernia.  Since I last saw her in the office she says that she has had virtually no symptoms.  She denies any abdominal pain worsening nausea vomiting constipation or obstipation.  She does not think the hernia has gotten any larger.  She says that she has not taken any steps to try to lose weight other than to try to reduce her intake of food.  She denies any exercise.    The history today was taken with the aid of an .    Objective   /76   Pulse 83   Temp 97.2 °F (36.2 °C) (Oral)   Ht 154.9 cm (61\")   Wt 127 kg (280 lb 3.2 oz)   SpO2 100%   BMI 52.94 kg/m²   Physical Exam   Constitutional: She appears well-developed and well-nourished.   HENT:   Head: Normocephalic and atraumatic.   Eyes: Conjunctivae are normal. No scleral icterus.   Cardiovascular: Normal rate and intact distal pulses.   Pulmonary/Chest: Effort normal. No respiratory distress.   Abdominal:   Soft obese nontender to palpation incarcerated umbilical hernia likely containing fat with some minor darkening of the skin over the hernia.   Skin: Skin is warm and dry.   Psychiatric: She has a normal mood and affect. Her behavior is normal.       Assessment/Plan   Candice was seen today for hernia.    Diagnoses and all orders for this visit:    Umbilical hernia without obstruction and without gangrene    I once again talked to her about the natural history of umbilical hernias and that she has a minimally symptomatic umbilical hernia.  I have also counseled her that because of her weight I cannot safely offer her an elective hernia repair.  She is okay with this.  We once again went through the warning signs of incarceration of bowel or strangulation of bowel in which she would need to seek immediate medical attention.  She seems to understand those warning signs.  I have talked to her about " potentially seeing our bariatric surgeon for weight loss education and/or surgical options but she does not wish to pursue those options.  At this point we will have her follow-up as needed.    Jon Diop MD  1/28/2020  11:47 AM

## 2020-02-18 NOTE — PROGRESS NOTES
Subjective   Candice Swan is a 54 y.o. female.     Chief Complaint   Patient presents with   • Urinary Tract Infection       Urinary Tract Infection    The current episode started 1 to 4 weeks ago. The problem occurs every urination. The quality of the pain is described as burning. The pain is at a severity of 5/10. The pain is mild. There has been no fever. Associated symptoms include flank pain, frequency, hesitancy and urgency. Pertinent negatives include no chills, nausea or vomiting. She has tried nothing for the symptoms. The treatment provided no relief.            I personally reviewed and updated the patient's allergies, medications, problem list, and past medical, surgical, social, and family history.     Family History   Problem Relation Age of Onset   • Diabetes Mother    • Heart disease Mother        Social History     Tobacco Use   • Smoking status: Never Smoker   • Smokeless tobacco: Never Used   Substance Use Topics   • Alcohol use: No     Frequency: Never   • Drug use: No       Past Surgical History:   Procedure Laterality Date   • CARDIOVASCULAR STRESS TEST  06/11/2019    abnormal   • CHOLECYSTECTOMY     • ECHO - CONVERTED  2019       Patient Active Problem List   Diagnosis   • Degeneration of intervertebral disc at C5-C6 level   • Numbness and tingling in left arm   • Umbilical hernia   • Osteoarthritis of both knees   • Obesity   • Abnormal nuclear stress test   • Fatigue   • Allergic rhinitis   • Fatty liver   • Mixed hyperlipidemia   • Empty sella (CMS/HCC)   • Cervical disc disease   • Lipoma   • GERD (gastroesophageal reflux disease)   • Spasm eyelid   • SOB (shortness of breath)   • Dysuria   • Acute cystitis without hematuria         Current Outpatient Medications:   •  cephalexin (KEFLEX) 500 MG capsule, Take 1 capsule by mouth 3 (Three) Times a Day for 10 days., Disp: 30 capsule, Rfl: 0  •  cyclobenzaprine (FLEXERIL) 10 MG tablet, Take 1/2 to 1 tablet at night as needed, Disp: 30  "tablet, Rfl: 0         Review of Systems   Constitutional: Negative for chills and diaphoresis.   Eyes: Negative for visual disturbance.   Respiratory: Negative for shortness of breath.    Cardiovascular: Negative for chest pain and palpitations.   Gastrointestinal: Negative for abdominal pain, nausea and vomiting.   Endocrine: Negative for polydipsia and polyphagia.   Genitourinary: Positive for flank pain, frequency, hesitancy and urgency.   Musculoskeletal: Negative for neck stiffness.   Skin: Negative for color change and pallor.   Neurological: Negative for seizures and syncope.   Hematological: Negative for adenopathy.       Objective   /73 (BP Location: Left arm, Patient Position: Sitting, Cuff Size: Adult)   Pulse 94   Temp 96.7 °F (35.9 °C)   Resp 20   Ht 154.9 cm (61\")   Wt 126 kg (278 lb 6.4 oz)   LMP 08/01/2019   SpO2 95%   BMI 52.60 kg/m²   Wt Readings from Last 3 Encounters:   02/19/20 126 kg (278 lb 6.4 oz)   01/28/20 127 kg (280 lb 3.2 oz)   01/24/20 128 kg (283 lb)     Physical Exam   Constitutional: She is oriented to person, place, and time. She appears well-developed and well-nourished.   Cardiovascular: Normal rate, regular rhythm, S1 normal, S2 normal, normal heart sounds, intact distal pulses and normal pulses. Exam reveals no gallop and no friction rub.   No murmur heard.  Pulmonary/Chest: Effort normal and breath sounds normal. No accessory muscle usage or stridor. She has no decreased breath sounds. She has no wheezes. She has no rhonchi. She has no rales.   Abdominal: Soft. Normal appearance, normal aorta and bowel sounds are normal. She exhibits no distension, no pulsatile midline mass and no mass. There is no hepatosplenomegaly. There is no tenderness. There is no rigidity, no rebound, no guarding, no CVA tenderness and negative Aceves's sign. No hernia.   Neurological: She is alert and oriented to person, place, and time. Coordination and gait normal.   Skin: Skin is warm " and dry. Turgor is normal. She is not diaphoretic. No pallor.         Assessment/Plan       UTI.  Start antibiotics.  Increase fluid intake.  Call return if fever or worsening symptoms.  Elevated fasting blood sugar.  Has been working on diet and exercise.  Follow-up recheck.  Umbilical hernia.  Followed by Dr. Diop, working on weight loss, monitoring.  Obesity.  Improved, working on diet and exercise, has lost weight.  Recommend bariatric surgery she states she will consider.  Abnormal stress test.  Stress Cardiolite with abnormal area, subsequent echo normal.  Followed by cardiology.  Exercise has been recommended.  Thoracic back pain.  Strain.  Continue NSAIDs.  Rehabilitation exercises discussed.  Add PRN muscle relaxer.  Osteoarthritis knees.  Improved on diclofenac as needed.    Problem List Items Addressed This Visit        Unprioritized    Fatigue    Relevant Orders    Iron Profile    Ferritin    Folate    CBC & Differential    Comprehensive Metabolic Panel    TSH    Mixed hyperlipidemia    Relevant Orders    Lipid Panel With / Chol / HDL Ratio    Dysuria - Primary    Relevant Orders    POC Urinalysis Dipstick, Automated (Completed)    Urine Culture - Urine, Urine, Clean Catch (Completed)    Acute cystitis without hematuria              Expected course, medications, and adverse effects discussed.  Call or return if worsening or persistent symptoms.

## 2020-02-19 ENCOUNTER — OFFICE VISIT (OUTPATIENT)
Dept: FAMILY MEDICINE CLINIC | Facility: CLINIC | Age: 55
End: 2020-02-19

## 2020-02-19 VITALS
BODY MASS INDEX: 52.56 KG/M2 | OXYGEN SATURATION: 95 % | HEIGHT: 61 IN | WEIGHT: 278.4 LBS | SYSTOLIC BLOOD PRESSURE: 103 MMHG | HEART RATE: 94 BPM | TEMPERATURE: 96.7 F | RESPIRATION RATE: 20 BRPM | DIASTOLIC BLOOD PRESSURE: 73 MMHG

## 2020-02-19 DIAGNOSIS — R30.0 DYSURIA: Primary | ICD-10-CM

## 2020-02-19 DIAGNOSIS — E78.2 MIXED HYPERLIPIDEMIA: ICD-10-CM

## 2020-02-19 DIAGNOSIS — N30.00 ACUTE CYSTITIS WITHOUT HEMATURIA: ICD-10-CM

## 2020-02-19 DIAGNOSIS — R53.83 FATIGUE, UNSPECIFIED TYPE: ICD-10-CM

## 2020-02-19 LAB
BILIRUB BLD-MCNC: NEGATIVE MG/DL
CLARITY, POC: CLEAR
COLOR UR: YELLOW
GLUCOSE UR STRIP-MCNC: NEGATIVE MG/DL
KETONES UR QL: NEGATIVE
LEUKOCYTE EST, POC: NEGATIVE
NITRITE UR-MCNC: NEGATIVE MG/ML
PH UR: 6 [PH] (ref 5–8)
PROT UR STRIP-MCNC: ABNORMAL MG/DL
RBC # UR STRIP: NEGATIVE /UL
SP GR UR: 1.02 (ref 1–1.03)
UROBILINOGEN UR QL: NORMAL

## 2020-02-19 PROCEDURE — 81003 URINALYSIS AUTO W/O SCOPE: CPT | Performed by: FAMILY MEDICINE

## 2020-02-19 PROCEDURE — 99214 OFFICE O/P EST MOD 30 MIN: CPT | Performed by: FAMILY MEDICINE

## 2020-02-19 RX ORDER — CEPHALEXIN 500 MG/1
500 CAPSULE ORAL 3 TIMES DAILY
Qty: 30 CAPSULE | Refills: 0 | Status: SHIPPED | OUTPATIENT
Start: 2020-02-19 | End: 2022-10-06 | Stop reason: SDUPTHER

## 2020-02-19 RX ORDER — CYCLOBENZAPRINE HCL 10 MG
TABLET ORAL
Qty: 30 TABLET | Refills: 0 | Status: SHIPPED | OUTPATIENT
Start: 2020-02-19 | End: 2021-07-19

## 2020-02-22 LAB
BACTERIA UR CULT: ABNORMAL
BACTERIA UR CULT: ABNORMAL
OTHER ANTIBIOTIC SUSC ISLT: ABNORMAL

## 2020-02-27 PROBLEM — N30.00 ACUTE CYSTITIS WITHOUT HEMATURIA: Status: ACTIVE | Noted: 2020-02-27

## 2020-05-19 PROBLEM — Z12.4 SCREENING FOR MALIGNANT NEOPLASM OF CERVIX: Status: ACTIVE | Noted: 2020-05-19

## 2020-05-19 PROBLEM — Z12.39 SCREENING FOR MALIGNANT NEOPLASM OF BREAST: Status: ACTIVE | Noted: 2020-05-19

## 2020-05-19 PROBLEM — Z00.01 ANNUAL VISIT FOR GENERAL ADULT MEDICAL EXAMINATION WITH ABNORMAL FINDINGS: Status: ACTIVE | Noted: 2020-05-19

## 2020-07-07 PROBLEM — J06.9 VIRAL UPPER RESPIRATORY TRACT INFECTION: Status: ACTIVE | Noted: 2020-07-07

## 2020-07-07 PROBLEM — N30.00 ACUTE CYSTITIS WITHOUT HEMATURIA: Status: RESOLVED | Noted: 2020-02-27 | Resolved: 2020-07-07

## 2020-07-07 NOTE — PROGRESS NOTES
Subjective   Candice Swan is a 55 y.o. female.     Chief Complaint   Patient presents with   • URI   • decreased taste       Cough   This is a new problem. The current episode started 1 to 4 weeks ago (july 2,2020). The problem has been gradually worsening. The problem occurs constantly. The cough is productive of sputum. Associated symptoms include chills, ear congestion, headaches, nasal congestion, postnasal drip, shortness of breath and sweats. Pertinent negatives include no chest pain, ear pain, fever, sore throat or wheezing. Nothing aggravates the symptoms. Treatments tried: tylenol. The treatment provided no relief.   URI    This is a new problem. The current episode started 1 to 4 weeks ago (July 2,2020). The problem has been gradually worsening. There has been no fever. Associated symptoms include coughing and headaches. Pertinent negatives include no abdominal pain, chest pain, ear pain, nausea, sinus pain, sneezing, sore throat or wheezing. She has tried acetaminophen for the symptoms. The treatment provided no relief.            I personally reviewed and updated the patient's allergies, medications, problem list, and past medical, surgical, social, and family history.     Family History   Problem Relation Age of Onset   • Diabetes Mother    • Heart disease Mother        Social History     Tobacco Use   • Smoking status: Never Smoker   • Smokeless tobacco: Never Used   Substance Use Topics   • Alcohol use: No     Frequency: Never   • Drug use: No       Past Surgical History:   Procedure Laterality Date   • CARDIOVASCULAR STRESS TEST  06/11/2019    abnormal   • CHOLECYSTECTOMY     • ECHO - CONVERTED  2019       Patient Active Problem List   Diagnosis   • Degeneration of intervertebral disc at C5-C6 level   • Numbness and tingling in left arm   • Umbilical hernia   • Osteoarthritis of both knees   • Obesity   • Abnormal nuclear stress test   • Fatigue   • Allergic rhinitis   • Fatty liver   • Mixed  "hyperlipidemia   • Empty sella (CMS/HCC)   • Cervical disc disease   • Lipoma   • GERD (gastroesophageal reflux disease)   • Spasm eyelid   • SOB (shortness of breath)   • Dysuria   • Annual visit for general adult medical examination with abnormal findings   • Screening for malignant neoplasm of breast   • Screening for malignant neoplasm of cervix   • Viral upper respiratory tract infection   • Acute bacterial sinusitis   • Seasonal allergic rhinitis due to pollen         Current Outpatient Medications:   •  azithromycin (ZITHROMAX) 250 MG tablet, Take 2 tablets the first day, then 1 tablet daily for 4 days., Disp: 6 tablet, Rfl: 0  •  cyclobenzaprine (FLEXERIL) 10 MG tablet, Take 1/2 to 1 tablet at night as needed, Disp: 30 tablet, Rfl: 0         Review of Systems   Constitutional: Positive for chills. Negative for diaphoresis and fever.   HENT: Positive for postnasal drip. Negative for ear pain, sneezing, sore throat, trouble swallowing and voice change.    Eyes: Negative for visual disturbance.   Respiratory: Positive for cough and shortness of breath. Negative for wheezing.    Cardiovascular: Negative for chest pain and palpitations.   Gastrointestinal: Negative for abdominal pain and nausea.   Endocrine: Negative for polydipsia and polyphagia.   Genitourinary: Negative for hematuria.   Musculoskeletal: Negative for neck stiffness.   Skin: Negative for color change and pallor.   Allergic/Immunologic: Negative for immunocompromised state.   Neurological: Negative for seizures and syncope.   Hematological: Negative for adenopathy.   Psychiatric/Behavioral: Negative for sleep disturbance and suicidal ideas.       I have reviewed and confirmed the accuracy of the ROS as documented by the MA/LPN/RN Brandin Sunshine MD      Objective   Ht 154.9 cm (61\")   Wt 127 kg (280 lb)   LMP 08/01/2019   BMI 52.91 kg/m²   BP Readings from Last 3 Encounters:   02/19/20 103/73   01/28/20 129/76   01/24/20 115/78     Wt Readings " from Last 3 Encounters:   07/08/20 127 kg (280 lb)   02/19/20 126 kg (278 lb 6.4 oz)   01/28/20 127 kg (280 lb 3.2 oz)     Physical Exam    Candice Swan consented to undergo a video visit today.  This format was necessitated by the current covid-19 virus pandemic.  14 minutes was spent on the phone today with Candice discussing her acute concerns and chronic medical problems.    Assessment/Plan      Medications        Problem List         LOS    Acute bacterial sinusitis.  Start antibiotics.  Increase fluid intake.  Afebrile.  DDX includes COVID-19 infection.  Continue strict quarantine/social distancing.  Call back if respiratory difficulty, fever or worsening symptoms develop.  Allergic rhinitis.  Over-the-counter Claritin or Zyrtec.  Elevated fasting blood sugar.  Has been working on diet and exercise.  Follow-up recheck.  Umbilical hernia.  Followed by Dr. Diop, working on weight loss, monitoring.  Obesity.  Improved, working on diet and exercise, has lost weight.  Recommend bariatric surgery she states she will consider.  Abnormal stress test.  Stress Cardiolite with abnormal area, subsequent echo normal.  Followed by cardiology.  Exercise has been recommended.  Thoracic back pain.  Strain.  Continue NSAIDs.  Rehabilitation exercises discussed.  Add PRN muscle relaxer.  Osteoarthritis knees.  Improved on diclofenac as needed.      Problem List Items Addressed This Visit        High    Obesity    Overview     Discussed diet, exercise, lifestyle modification.  Did not tolerate Wellbutrin.  Recommend sleep study she declines.  Recommend HMR program/bariatric surgery referral she declines.  Follow-up recheck.            Unprioritized    Viral upper respiratory tract infection - Primary    Acute bacterial sinusitis    Relevant Medications    azithromycin (ZITHROMAX) 250 MG tablet    Seasonal allergic rhinitis due to pollen              Expected course, medications, and adverse effects discussed.  Call or return if  worsening or persistent symptoms.

## 2020-07-08 ENCOUNTER — OFFICE VISIT (OUTPATIENT)
Dept: FAMILY MEDICINE CLINIC | Facility: CLINIC | Age: 55
End: 2020-07-08

## 2020-07-08 VITALS — BODY MASS INDEX: 52.87 KG/M2 | HEIGHT: 61 IN | WEIGHT: 280 LBS

## 2020-07-08 DIAGNOSIS — J06.9 VIRAL UPPER RESPIRATORY TRACT INFECTION: Primary | ICD-10-CM

## 2020-07-08 DIAGNOSIS — B96.89 ACUTE BACTERIAL SINUSITIS: ICD-10-CM

## 2020-07-08 DIAGNOSIS — J01.90 ACUTE BACTERIAL SINUSITIS: ICD-10-CM

## 2020-07-08 DIAGNOSIS — J30.1 SEASONAL ALLERGIC RHINITIS DUE TO POLLEN: ICD-10-CM

## 2020-07-08 DIAGNOSIS — E66.01 CLASS 3 SEVERE OBESITY WITH SERIOUS COMORBIDITY AND BODY MASS INDEX (BMI) OF 50.0 TO 59.9 IN ADULT, UNSPECIFIED OBESITY TYPE (HCC): ICD-10-CM

## 2020-07-08 PROCEDURE — 99442 PR PHYS/QHP TELEPHONE EVALUATION 11-20 MIN: CPT | Performed by: FAMILY MEDICINE

## 2020-07-08 RX ORDER — AZITHROMYCIN 250 MG/1
TABLET, FILM COATED ORAL
Qty: 6 TABLET | Refills: 0 | Status: SHIPPED | OUTPATIENT
Start: 2020-07-08 | End: 2021-07-19

## 2021-07-19 ENCOUNTER — OFFICE VISIT (OUTPATIENT)
Dept: FAMILY MEDICINE CLINIC | Facility: CLINIC | Age: 56
End: 2021-07-19

## 2021-07-19 VITALS
SYSTOLIC BLOOD PRESSURE: 120 MMHG | HEIGHT: 61 IN | TEMPERATURE: 97.5 F | WEIGHT: 291.8 LBS | OXYGEN SATURATION: 98 % | RESPIRATION RATE: 18 BRPM | BODY MASS INDEX: 55.09 KG/M2 | HEART RATE: 103 BPM | DIASTOLIC BLOOD PRESSURE: 88 MMHG

## 2021-07-19 DIAGNOSIS — M25.472 LEFT ANKLE SWELLING: ICD-10-CM

## 2021-07-19 DIAGNOSIS — E66.01 CLASS 3 SEVERE OBESITY DUE TO EXCESS CALORIES WITH SERIOUS COMORBIDITY AND BODY MASS INDEX (BMI) OF 50.0 TO 59.9 IN ADULT (HCC): ICD-10-CM

## 2021-07-19 DIAGNOSIS — M17.0 PRIMARY OSTEOARTHRITIS OF BOTH KNEES: Primary | ICD-10-CM

## 2021-07-19 PROBLEM — M25.562 ACUTE PAIN OF LEFT KNEE: Status: ACTIVE | Noted: 2021-07-19

## 2021-07-19 PROBLEM — E66.813 CLASS 3 SEVERE OBESITY DUE TO EXCESS CALORIES WITH SERIOUS COMORBIDITY IN ADULT: Status: ACTIVE | Noted: 2019-06-18

## 2021-07-19 PROCEDURE — 99213 OFFICE O/P EST LOW 20 MIN: CPT | Performed by: FAMILY MEDICINE

## 2021-07-19 RX ORDER — FUROSEMIDE 20 MG/1
20 TABLET ORAL DAILY
Qty: 30 TABLET | Refills: 5 | Status: SHIPPED | OUTPATIENT
Start: 2021-07-19 | End: 2023-02-06 | Stop reason: SDUPTHER

## 2021-07-19 RX ORDER — POTASSIUM CHLORIDE 20 MEQ/1
20 TABLET, EXTENDED RELEASE ORAL DAILY
Qty: 30 TABLET | Refills: 5 | Status: SHIPPED | OUTPATIENT
Start: 2021-07-19

## 2022-08-08 ENCOUNTER — OFFICE VISIT (OUTPATIENT)
Dept: FAMILY MEDICINE CLINIC | Facility: CLINIC | Age: 57
End: 2022-08-08

## 2022-08-08 ENCOUNTER — HOSPITAL ENCOUNTER (OUTPATIENT)
Dept: GENERAL RADIOLOGY | Facility: HOSPITAL | Age: 57
Discharge: HOME | End: 2022-08-08
Admitting: FAMILY MEDICINE

## 2022-08-08 VITALS
WEIGHT: 293 LBS | SYSTOLIC BLOOD PRESSURE: 116 MMHG | DIASTOLIC BLOOD PRESSURE: 76 MMHG | BODY MASS INDEX: 48.82 KG/M2 | HEIGHT: 65 IN | TEMPERATURE: 97.8 F | HEART RATE: 97 BPM | RESPIRATION RATE: 18 BRPM | OXYGEN SATURATION: 97 %

## 2022-08-08 DIAGNOSIS — M54.50 LUMBAR PAIN: ICD-10-CM

## 2022-08-08 DIAGNOSIS — M25.572 LEFT ANKLE PAIN, UNSPECIFIED CHRONICITY: ICD-10-CM

## 2022-08-08 DIAGNOSIS — G44.229 CHRONIC TENSION-TYPE HEADACHE, NOT INTRACTABLE: ICD-10-CM

## 2022-08-08 DIAGNOSIS — G62.9 NEUROPATHY: ICD-10-CM

## 2022-08-08 DIAGNOSIS — M25.472 LEFT ANKLE SWELLING: ICD-10-CM

## 2022-08-08 DIAGNOSIS — E66.01 CLASS 3 SEVERE OBESITY DUE TO EXCESS CALORIES WITH SERIOUS COMORBIDITY AND BODY MASS INDEX (BMI) OF 50.0 TO 59.9 IN ADULT: Primary | ICD-10-CM

## 2022-08-08 PROCEDURE — 99213 OFFICE O/P EST LOW 20 MIN: CPT | Performed by: FAMILY MEDICINE

## 2022-08-08 PROCEDURE — 72100 X-RAY EXAM L-S SPINE 2/3 VWS: CPT

## 2022-08-08 RX ORDER — CYCLOBENZAPRINE HCL 10 MG
10 TABLET ORAL DAILY PRN
Qty: 30 TABLET | Refills: 2 | Status: SHIPPED | OUTPATIENT
Start: 2022-08-08

## 2022-08-08 RX ORDER — AMITRIPTYLINE HYDROCHLORIDE 25 MG/1
25 TABLET, FILM COATED ORAL NIGHTLY
Qty: 30 TABLET | Refills: 5 | Status: SHIPPED | OUTPATIENT
Start: 2022-08-08

## 2022-08-08 NOTE — PROGRESS NOTES
Subjective   Candice Swan is a 57 y.o. female.     Chief Complaint   Patient presents with   • Leg Pain       Leg Pain   Incident onset: 2 months. The injury mechanism is unknown. The pain is present in the left ankle and left foot. The pain is mild. The pain has been fluctuating since onset. Associated symptoms include numbness and tingling. Pertinent negatives include no inability to bear weight, loss of motion or loss of sensation. Associated symptoms comments: Tightness  Swelling,   . She reports no foreign bodies present. Exacerbated by: siting. She has tried nothing for the symptoms.            I personally reviewed and updated the patient's allergies, medications, problem list, and past medical, surgical, social, and family history. I have reviewed and confirmed the accuracy of the History of Present Illness and Review of Symptoms as documented by the MA/LPN/RN. Brandin Sunshine MD    Family History   Problem Relation Age of Onset   • Diabetes Mother    • Heart disease Mother        Social History     Tobacco Use   • Smoking status: Never Smoker   • Smokeless tobacco: Never Used   Vaping Use   • Vaping Use: Never used   Substance Use Topics   • Alcohol use: No   • Drug use: No       Past Surgical History:   Procedure Laterality Date   • CARDIOVASCULAR STRESS TEST  06/11/2019    abnormal   • CHOLECYSTECTOMY     • ECHO - CONVERTED  2019       Patient Active Problem List   Diagnosis   • Degeneration of intervertebral disc at C5-C6 level   • Numbness and tingling in left arm   • Umbilical hernia   • Osteoarthritis of both knees   • Class 3 severe obesity due to excess calories with serious comorbidity in adult (HCC)   • Abnormal nuclear stress test   • Fatigue   • Allergic rhinitis   • Fatty liver   • Mixed hyperlipidemia   • Empty sella (HCC)   • Cervical disc disease   • Lipoma   • GERD (gastroesophageal reflux disease)   • Spasm eyelid   • SOB (shortness of breath)   • Dysuria   • Annual visit for general  "adult medical examination with abnormal findings   • Screening for malignant neoplasm of breast   • Screening for malignant neoplasm of cervix   • Viral upper respiratory tract infection   • Acute bacterial sinusitis   • Seasonal allergic rhinitis due to pollen   • Acute pain of left knee   • Neuropathy         Current Outpatient Medications:   •  amitriptyline (ELAVIL) 25 MG tablet, Take 1 tablet by mouth Every Night., Disp: 30 tablet, Rfl: 5  •  cyclobenzaprine (FLEXERIL) 10 MG tablet, Take 1 tablet by mouth Daily As Needed for Muscle Spasms. 1 tablet to half tablet as needed nightly, Disp: 30 tablet, Rfl: 2  •  furosemide (LASIX) 20 MG tablet, Take 1 tablet by mouth Daily., Disp: 30 tablet, Rfl: 5  •  potassium chloride (K-DUR,KLOR-CON) 20 MEQ CR tablet, Take 1 tablet by mouth Daily., Disp: 30 tablet, Rfl: 5         Review of Systems   Constitutional: Negative for chills and diaphoresis.   Eyes: Negative for visual disturbance.   Respiratory: Negative for shortness of breath.    Cardiovascular: Negative for chest pain and palpitations.   Gastrointestinal: Negative for abdominal pain and nausea.   Endocrine: Negative for polydipsia and polyphagia.   Musculoskeletal: Negative for neck stiffness.   Skin: Negative for color change and pallor.   Neurological: Positive for tingling and numbness. Negative for seizures and syncope.   Hematological: Negative for adenopathy.   Psychiatric/Behavioral: Negative for hallucinations and suicidal ideas.       I have reviewed and confirmed the accuracy of the ROS as documented by the MA/LPN/RN Brandin Sunshine MD      Objective   /76 (BP Location: Left arm, Patient Position: Sitting, Cuff Size: Large Adult)   Pulse 97   Temp 97.8 °F (36.6 °C)   Resp 18   Ht 165.1 cm (65\")   Wt 135 kg (297 lb 9.6 oz)   LMP 08/01/2019   SpO2 97%   BMI 49.52 kg/m²   BP Readings from Last 3 Encounters:   08/08/22 116/76   07/19/21 120/88   02/19/20 103/73     Wt Readings from Last 3 " Encounters:   08/08/22 135 kg (297 lb 9.6 oz)   07/19/21 132 kg (291 lb 12.8 oz)   07/08/20 127 kg (280 lb)     Physical Exam  Constitutional:       Appearance: Normal appearance. She is well-developed. She is not diaphoretic.   Eyes:      General: Lids are normal.      Extraocular Movements:      Right eye: No nystagmus.      Left eye: No nystagmus.      Conjunctiva/sclera: Conjunctivae normal.      Right eye: Right conjunctiva is not injected. No hemorrhage.     Left eye: Left conjunctiva is not injected. No hemorrhage.     Pupils: Pupils are equal, round, and reactive to light.      Funduscopic exam:     Right eye: No hemorrhage, exudate, AV nicking, arteriolar narrowing or papilledema.         Left eye: No hemorrhage, exudate, AV nicking, arteriolar narrowing or papilledema.   Cardiovascular:      Rate and Rhythm: Normal rate and regular rhythm.      Pulses: Normal pulses.      Heart sounds: Normal heart sounds, S1 normal and S2 normal. No murmur heard.    No friction rub. No gallop.   Pulmonary:      Effort: Pulmonary effort is normal. No accessory muscle usage.      Breath sounds: Normal breath sounds. No stridor. No decreased breath sounds, wheezing, rhonchi or rales.   Abdominal:      General: Bowel sounds are normal. There is no distension.      Palpations: Abdomen is soft. Abdomen is not rigid. There is no mass or pulsatile mass.      Tenderness: There is no abdominal tenderness. There is no guarding or rebound. Negative signs include Aceves's sign.      Hernia: No hernia is present.   Musculoskeletal:      Thoracic back: Normal. No swelling, edema, deformity, lacerations, spasms or tenderness. Normal range of motion.      Lumbar back: No swelling, edema, deformity, spasms or tenderness. Normal range of motion.      Right hip: No deformity, tenderness or crepitus. Normal range of motion. Normal strength.      Left hip: Normal. No deformity, tenderness or crepitus. Normal range of motion. Normal strength.    Skin:     General: Skin is warm and dry.      Coloration: Skin is not pale.   Neurological:      Mental Status: She is alert and oriented to person, place, and time.      Sensory: No sensory deficit.      Motor: No atrophy or abnormal muscle tone.      Coordination: Coordination normal.      Gait: Gait normal.      Deep Tendon Reflexes: Reflexes are normal and symmetric.         Data / Lab Results:    No results found for: HGBA1C     Lab Results   Component Value Date    LDL 62 05/02/2019    LDL 63 11/01/2016     Lab Results   Component Value Date    CHOL 129 05/02/2019    CHOL 136 11/01/2016     Lab Results   Component Value Date    TRIG 98 05/02/2019    TRIG 126 11/01/2016     Lab Results   Component Value Date    HDL 49 (L) 05/02/2019    HDL 48 11/01/2016     No results found for: PSA  Lab Results   Component Value Date    WBC 6.5 05/02/2019    HGB 13.5 05/02/2019    HCT 40.2 05/02/2019    MCV 89.1 05/02/2019     05/02/2019     Lab Results   Component Value Date    TSH 3.47 05/02/2019      Lab Results   Component Value Date    BUN 12 05/02/2019    CREATININE 0.53 05/02/2019    EGFRIFNONA 108 05/02/2019    EGFRIFAFRI 126 05/02/2019    BCR NOT APPLICABLE 05/02/2019    K 3.9 05/02/2019    CO2 24 05/02/2019    CALCIUM 9.3 05/02/2019    ALBUMIN 3.7 05/02/2019    AST 15 05/02/2019    ALT 24 05/02/2019     No results found for: FARHANA, RF, SEDRATE   No results found for: CRP   No results found for: IRON, TIBC, FERRITIN   No results found for: [id number]       Assessment & Plan      Medications        Problem List         LOS    Left leg pain evaluated with numbness/tingling.  DDx includes lumbar disc disease, check x-rays, start as needed nighttime muscle relaxant, rehabilitation exercise discussed.  Check blood work.  Add nightly amitriptyline.  Follow-up recheck.  Lower extremity edema.    Improved today, has completed course diuretics.  Benign exam today, no sign of DVT.  DDx includes venous insufficiency,  sleep apnea.  Echo benign 2019.  Start Lasix/potassium/JOAQUIN hose.  Discussed low-sodium diet.  Recommend sleep eval she declines currently but states she will consider.  Follow-up recheck.  Consider further eval if persistent symptoms.  Allergic rhinitis.  Over-the-counter Claritin or Zyrtec.  Elevated fasting blood sugar.  Has been working on diet and exercise.  Follow-up recheck.  Umbilical hernia.  Followed by Dr. Diop, working on weight loss, monitoring.  Obesity.  Improved, working on diet and exercise, has lost weight.  Recommend bariatric surgery she states she will consider.  Abnormal stress test.  Stress Cardiolite with abnormal area, subsequent echo normal.  Followed by cardiology.  Exercise has been recommended.  Thoracic back pain.  Strain.  Continue NSAIDs.  Rehabilitation exercises discussed.  Add PRN muscle relaxer.  Osteoarthritis knees.  Improved on diclofenac as needed.  Recommend follow-up visit with comprehensive physical exam screening test.      Diagnoses and all orders for this visit:    1. Class 3 severe obesity due to excess calories with serious comorbidity and body mass index (BMI) of 50.0 to 59.9 in adult ([facility]) (Primary)    2. Left ankle pain, unspecified chronicity    3. Left ankle swelling    4. Lumbar pain  -     cyclobenzaprine (FLEXERIL) 10 MG tablet; Take 1 tablet by mouth Daily As Needed for Muscle Spasms. 1 tablet to half tablet as needed nightly  Dispense: 30 tablet; Refill: 2  -     XR Spine Lumbar 2 or 3 View    5. Chronic tension-type headache, not intractable  -     amitriptyline (ELAVIL) 25 MG tablet; Take 1 tablet by mouth Every Night.  Dispense: 30 tablet; Refill: 5    6. Neuropathy              Expected course, medications, and adverse effects discussed.  Call or return if worsening or persistent symptoms.  I wore protective equipment throughout this patient encounter including a mask, gloves, and eye protection.  Hand hygiene was performed before donning protective  equipment and after removal when leaving the room. The complete contents of the Assessment and Plan and Data/Lab Results as documented above have been reviewed and addressed by myself with the patient today as part of an ongoing evaluation / treatment plan.  If some of the documentation has been copied from a previous note and is unchanged it indicates that this problem / plan has been assessed today but is stable from a previous visit and no changes have been recommended.

## 2022-10-04 PROBLEM — Z12.11 SCREEN FOR COLON CANCER: Status: ACTIVE | Noted: 2022-10-04

## 2022-10-04 NOTE — PROGRESS NOTES
Subjective   Candice Swan is a 57 y.o. female.     Chief Complaint   Patient presents with   • Annual Exam   • Hyperlipidemia       The patient is here: to discuss health maintenance and disease prevention.  Last comprehensive physical was on unknown.  Overall has: moderate activity with work/home activities, good appetite, feels well with minor complaints, good energy level, is sleeping well and returned to full activity. Nutrition: appropriate balanced diet and eating a variety of foods. Last tetanus shot was 5-10 years ago.   Patient is doing routine self breast exams: occasionally Patient's last mammogram was on 5/15/2019.     Hyperlipidemia  This is a chronic problem. The current episode started more than 1 year ago. Recent lipid tests were reviewed and are normal. Exacerbating diseases include obesity. Associated symptoms include leg pain. Pertinent negatives include no chest pain or shortness of breath. Current antihyperlipidemic treatment includes diet change and exercise. There are no compliance problems.  Risk factors for coronary artery disease include obesity.   Leg Pain   The incident occurred more than 1 week ago. There was no injury mechanism. The pain is present in the left thigh, left leg, left knee, right leg, right thigh and right knee (in both legs, but the left leg is worse). The quality of the pain is described as aching. The pain has been fluctuating since onset. Associated symptoms include muscle weakness, numbness and tingling. She reports no foreign bodies present. The symptoms are aggravated by movement and weight bearing. She has tried nothing for the symptoms.        Recent Hospitalizations:  No hospitalization(s) within the last year..  ccc      I personally reviewed and updated the patient's allergies, medications, problem list, and past medical, surgical, social, and family history. I have reviewed and confirmed the accuracy of the HPI and ROS as documented by the MA/LPN/RN Brandin  MD Bita    Family History   Problem Relation Age of Onset   • Diabetes Mother    • Heart disease Mother        Social History     Tobacco Use   • Smoking status: Never Smoker   • Smokeless tobacco: Never Used   Vaping Use   • Vaping Use: Never used   Substance Use Topics   • Alcohol use: No   • Drug use: No       Past Surgical History:   Procedure Laterality Date   • CARDIOVASCULAR STRESS TEST  06/11/2019    abnormal   • CHOLECYSTECTOMY     • ECHO - CONVERTED  2019       Patient Active Problem List   Diagnosis   • Degeneration of intervertebral disc at C5-C6 level   • Numbness and tingling in left arm   • Umbilical hernia   • Osteoarthritis of both knees   • Class 3 severe obesity due to excess calories with serious comorbidity and body mass index (BMI) of 45.0 to 49.9 in adult (HCC)   • Abnormal nuclear stress test   • Fatigue   • Allergic rhinitis   • Fatty liver   • Mixed hyperlipidemia   • Empty sella (HCC)   • Cervical disc disease   • Lipoma   • GERD (gastroesophageal reflux disease)   • Spasm eyelid   • SOB (shortness of breath)   • Dysuria   • Annual visit for general adult medical examination with abnormal findings   • Screening for malignant neoplasm of breast   • Screening for malignant neoplasm of cervix   • Viral upper respiratory tract infection   • Acute bacterial sinusitis   • Seasonal allergic rhinitis due to pollen   • Acute pain of left knee   • Neuropathy   • Screen for colon cancer         Current Outpatient Medications:   •  amitriptyline (ELAVIL) 25 MG tablet, Take 1 tablet by mouth Every Night., Disp: 30 tablet, Rfl: 5  •  cyclobenzaprine (FLEXERIL) 10 MG tablet, Take 1 tablet by mouth Daily As Needed for Muscle Spasms. 1 tablet to half tablet as needed nightly, Disp: 30 tablet, Rfl: 2  •  furosemide (LASIX) 20 MG tablet, Take 1 tablet by mouth Daily., Disp: 30 tablet, Rfl: 5  •  potassium chloride (K-DUR,KLOR-CON) 20 MEQ CR tablet, Take 1 tablet by mouth Daily., Disp: 30 tablet, Rfl:  "5    Review of Systems   Constitutional: Negative for chills and diaphoresis.   HENT: Negative for trouble swallowing and voice change.    Eyes: Negative for visual disturbance.   Respiratory: Negative for shortness of breath.    Cardiovascular: Negative for chest pain and palpitations.   Gastrointestinal: Negative for abdominal pain and nausea.   Endocrine: Negative for polydipsia and polyphagia.   Genitourinary: Negative for hematuria.   Musculoskeletal: Negative for neck stiffness.   Skin: Negative for color change and pallor.   Allergic/Immunologic: Negative for immunocompromised state.   Neurological: Positive for tingling and numbness. Negative for seizures and syncope.   Hematological: Negative for adenopathy.   Psychiatric/Behavioral: Negative for hallucinations, sleep disturbance and suicidal ideas.       I have reviewed and confirmed the accuracy of the ROS as documented by the MA/LPN/RN Brandin Sunshine MD      Objective   /70 (BP Location: Left arm, Patient Position: Sitting, Cuff Size: Adult)   Pulse 113   Temp 98.6 °F (37 °C)   Resp 18   Ht 165.1 cm (65\")   Wt 135 kg (298 lb 9.6 oz)   LMP 08/01/2019   SpO2 98%   Breastfeeding No   BMI 49.69 kg/m²   BP Readings from Last 3 Encounters:   10/05/22 130/70   08/08/22 116/76   07/19/21 120/88     Wt Readings from Last 3 Encounters:   10/05/22 135 kg (298 lb 9.6 oz)   08/08/22 135 kg (297 lb 9.6 oz)   07/19/21 132 kg (291 lb 12.8 oz)     Physical Exam  Constitutional:       Appearance: She is well-developed. She is not diaphoretic.   HENT:      Head: Normocephalic.      Right Ear: Tympanic membrane, ear canal and external ear normal.      Left Ear: Tympanic membrane, ear canal and external ear normal.      Nose: Nose normal.   Eyes:      General: Lids are normal.      Conjunctiva/sclera: Conjunctivae normal.      Pupils: Pupils are equal, round, and reactive to light.   Neck:      Thyroid: No thyromegaly.      Vascular: No carotid bruit or JVD. "      Trachea: No tracheal deviation.   Cardiovascular:      Rate and Rhythm: Normal rate and regular rhythm.      Heart sounds: Normal heart sounds. No murmur heard.    No friction rub. No gallop.   Pulmonary:      Effort: Pulmonary effort is normal.      Breath sounds: Normal breath sounds. No stridor. No decreased breath sounds, wheezing or rales.   Abdominal:      General: Bowel sounds are normal. There is no distension.      Palpations: Abdomen is soft. There is no mass.      Tenderness: There is no abdominal tenderness. There is no guarding or rebound.      Hernia: No hernia is present.   Lymphadenopathy:      Head:      Right side of head: No submental, submandibular, tonsillar, preauricular, posterior auricular or occipital adenopathy.      Left side of head: No submental, submandibular, tonsillar, preauricular, posterior auricular or occipital adenopathy.      Cervical: No cervical adenopathy.   Skin:     General: Skin is warm and dry.      Coloration: Skin is not pale.   Neurological:      Mental Status: She is alert and oriented to person, place, and time.      Cranial Nerves: No cranial nerve deficit.      Sensory: No sensory deficit.      Coordination: Coordination normal.      Gait: Gait normal.      Deep Tendon Reflexes: Reflexes are normal and symmetric.         Data / Lab Results:    No results found for: HGBA1C     Lab Results   Component Value Date    LDL 73 09/06/2022    LDL 62 05/02/2019    LDL 63 11/01/2016     Lab Results   Component Value Date    CHOL 129 05/02/2019    CHOL 136 11/01/2016     Lab Results   Component Value Date    TRIG 79 09/06/2022    TRIG 98 05/02/2019    TRIG 126 11/01/2016     Lab Results   Component Value Date    HDL 51 09/06/2022    HDL 49 (L) 05/02/2019    HDL 48 11/01/2016     No results found for: PSA  Lab Results   Component Value Date    WBC 6.5 09/06/2022    HGB 14.3 09/06/2022    HCT 44.1 09/06/2022    MCV 92 09/06/2022     09/06/2022     Lab Results    Component Value Date    TSH 2.850 09/06/2022      Lab Results   Component Value Date    GLUCOSE 130 (H) 09/06/2022    BUN 17 09/06/2022    CREATININE 0.54 (L) 09/06/2022    EGFRIFNONA 108 05/02/2019    EGFRIFAFRI 126 05/02/2019    BCR 31 (H) 09/06/2022    K 4.1 09/06/2022    CO2 23 09/06/2022    CALCIUM 9.5 09/06/2022    PROTENTOTREF 6.7 09/06/2022    ALBUMIN 4.0 09/06/2022    LABIL2 1.5 09/06/2022    AST 22 09/06/2022    ALT 35 (H) 09/06/2022     No results found for: FARHANA, RF, SEDRATE   No results found for: CRP   No results found for: IRON, TIBC, FERRITIN   No results found for: [id number]     Age-appropriate Screening Schedule:  Refer to the list below for future screening recommendations based on patient's age, sex and/or medical conditions. Orders for these recommended tests are listed in the plan section. The patient has been provided with a written plan.    Health Maintenance   Topic Date Due   • ZOSTER VACCINE (1 of 2) Never done   • PAP SMEAR  01/13/2020   • MAMMOGRAM  05/15/2021   • INFLUENZA VACCINE  08/01/2022   • LIPID PANEL  09/06/2023   • TDAP/TD VACCINES (2 - Td or Tdap) 11/01/2026           Assessment & Plan      Medications        Problem List         LOS    Physical.  Doing well, vaccines current.  Discussed calcium vitamin D.  D iscussed coated baby aspirin daily.  Discussed health maintenance, screening test last modification.  Mammogram scheduled/Pap sent.  Elevated blood sugar.  Borderline , A1c to 6.1.  Discussed diet, exercise, lifestyle modification, weight loss.  Follow-up recheck fasting blood sugar 6 months.  Elevated LFTs.  Mild, new onset.  DDx includes fatty liver add fish oil.  Follow-up recheck.  Consider further eval persistent elevation.  Left leg pain evaluated with numbness/tingling.    Improved today, x-ray with mild signs of degeneration, continue as needed nighttime muscle relaxant, rehabilitation exercise discussed, nightly amitriptyline.  Follow-up recheck.  Lower  extremity edema.    Improved today, has completed course diuretics.  Benign exam today, no sign of DVT.  DDx includes venous insufficiency, sleep apnea.  Echo benign 2019.  Start Lasix/potassium/JOAQUIN hose.  Discussed low-sodium diet.  Recommend sleep eval she declines currently but states she will consider.  Follow-up recheck.  Consider further eval if persistent symptoms.  Allergic rhinitis.  Over-the-counter Claritin or Zyrtec.  Elevated fasting blood sugar.  Has been working on diet and exercise.  Follow-up recheck.  Umbilical hernia.  Followed by Dr. Diop, working on weight loss, monitoring.  Obesity.  Improved, working on diet and exercise, has lost weight.  Recommend bariatric surgery, sleep study she declines  Abnormal stress test.  Stress Cardiolite with abnormal area, subsequent echo normal.  Followed by cardiology.  Exercise has been recommended.  Thoracic back pain.  Strain.  Continue NSAIDs.  Rehabilitation exercises discussed.  Add PRN muscle relaxer.  Osteoarthritis knees.  Improved on diclofenac as needed.  Colon cancer screening.  Recommend colonoscopy/Cologuard, Cologuard scheduled.  Recommend follow-up visit with comprehensive physical exam screening test.      Diagnoses and all orders for this visit:    1. Annual visit for general adult medical examination with abnormal findings (Primary)    2. Pain in both lower extremities    3. Mixed hyperlipidemia    4. Class 3 severe obesity due to excess calories with serious comorbidity and body mass index (BMI) of 45.0 to 49.9 in adult ([facility])  Assessment & Plan:  Patient's (Body mass index is 49.69 kg/m².) indicates that they are morbidly obese (BMI > 40 or > 35 with obesity - related health condition) with health conditions that include dyslipidemias . Weight is unchanged. BMI is is above average; BMI management plan is completed. We discussed portion control and increasing exercise.       5. Encounter for screening for malignant neoplasm of breast,  unspecified screening modality    6. Screening for malignant neoplasm of cervix    7. Screen for colon cancer            Expected course, medications, and adverse effects discussed.  Call or return if worsening or persistent symptoms.  I wore protective equipment throughout this patient encounter including a mask, gloves, and eye protection.  Hand hygiene was performed before donning protective equipment and after removal when leaving the room. The complete contents of the Assessment and Plan and Data / Lab Results as documented above have been reviewed and addressed by myself with the patient today as part of an ongoing evaluation / treatment plan.  If some of the documentation has been copied from a previous note and is unchanged it indicates that this problem / plan has been assessed today but is stable from a previous visit and no changes have been recommended.  The separate E/M service provided today is significant, medically necessary, and separately identifiable.

## 2022-10-05 ENCOUNTER — OFFICE VISIT (OUTPATIENT)
Dept: FAMILY MEDICINE CLINIC | Facility: CLINIC | Age: 57
End: 2022-10-05

## 2022-10-05 VITALS
HEART RATE: 113 BPM | TEMPERATURE: 98.6 F | HEIGHT: 65 IN | SYSTOLIC BLOOD PRESSURE: 130 MMHG | RESPIRATION RATE: 18 BRPM | DIASTOLIC BLOOD PRESSURE: 70 MMHG | BODY MASS INDEX: 48.82 KG/M2 | WEIGHT: 293 LBS | OXYGEN SATURATION: 98 %

## 2022-10-05 DIAGNOSIS — Z12.39 ENCOUNTER FOR SCREENING FOR MALIGNANT NEOPLASM OF BREAST, UNSPECIFIED SCREENING MODALITY: ICD-10-CM

## 2022-10-05 DIAGNOSIS — Z12.11 SCREEN FOR COLON CANCER: ICD-10-CM

## 2022-10-05 DIAGNOSIS — M79.605 PAIN IN BOTH LOWER EXTREMITIES: ICD-10-CM

## 2022-10-05 DIAGNOSIS — Z12.4 SCREENING FOR MALIGNANT NEOPLASM OF CERVIX: ICD-10-CM

## 2022-10-05 DIAGNOSIS — R30.0 DYSURIA: ICD-10-CM

## 2022-10-05 DIAGNOSIS — E78.2 MIXED HYPERLIPIDEMIA: ICD-10-CM

## 2022-10-05 DIAGNOSIS — E66.01 CLASS 3 SEVERE OBESITY DUE TO EXCESS CALORIES WITH SERIOUS COMORBIDITY AND BODY MASS INDEX (BMI) OF 45.0 TO 49.9 IN ADULT: ICD-10-CM

## 2022-10-05 DIAGNOSIS — M79.604 PAIN IN BOTH LOWER EXTREMITIES: ICD-10-CM

## 2022-10-05 DIAGNOSIS — R73.01 ELEVATED FASTING BLOOD SUGAR: ICD-10-CM

## 2022-10-05 DIAGNOSIS — Z00.01 ANNUAL VISIT FOR GENERAL ADULT MEDICAL EXAMINATION WITH ABNORMAL FINDINGS: Primary | ICD-10-CM

## 2022-10-05 LAB
BILIRUB BLD-MCNC: NEGATIVE MG/DL
CLARITY, POC: ABNORMAL
COLOR UR: YELLOW
EXPIRATION DATE: NORMAL
GLUCOSE UR STRIP-MCNC: NEGATIVE MG/DL
HBA1C MFR BLD: 6.1 %
KETONES UR QL: NEGATIVE
LEUKOCYTE EST, POC: NEGATIVE
Lab: NORMAL
NITRITE UR-MCNC: NEGATIVE MG/ML
PH UR: 5 [PH] (ref 5–8)
PROT UR STRIP-MCNC: NEGATIVE MG/DL
RBC # UR STRIP: NEGATIVE /UL
SP GR UR: 1.03 (ref 1–1.03)
UROBILINOGEN UR QL: NORMAL

## 2022-10-05 PROCEDURE — 83036 HEMOGLOBIN GLYCOSYLATED A1C: CPT | Performed by: FAMILY MEDICINE

## 2022-10-05 PROCEDURE — 99396 PREV VISIT EST AGE 40-64: CPT | Performed by: FAMILY MEDICINE

## 2022-10-05 PROCEDURE — 81002 URINALYSIS NONAUTO W/O SCOPE: CPT | Performed by: FAMILY MEDICINE

## 2022-10-05 PROCEDURE — 99213 OFFICE O/P EST LOW 20 MIN: CPT | Performed by: FAMILY MEDICINE

## 2022-10-05 NOTE — ASSESSMENT & PLAN NOTE
Patient's (Body mass index is 49.69 kg/m².) indicates that they are morbidly obese (BMI > 40 or > 35 with obesity - related health condition) with health conditions that include dyslipidemias . Weight is unchanged. BMI is is above average; BMI management plan is completed. We discussed portion control and increasing exercise.

## 2022-10-06 ENCOUNTER — TELEPHONE (OUTPATIENT)
Dept: FAMILY MEDICINE CLINIC | Facility: CLINIC | Age: 57
End: 2022-10-06

## 2022-10-06 RX ORDER — CEPHALEXIN 500 MG/1
500 CAPSULE ORAL 3 TIMES DAILY
Qty: 30 CAPSULE | Refills: 0 | Status: SHIPPED | OUTPATIENT
Start: 2022-10-06 | End: 2022-10-16

## 2022-10-06 NOTE — TELEPHONE ENCOUNTER
dtr said when she went to restroom it burned she said she it was for a possible UTI, urine does look clean just slightly cloudy

## 2022-10-06 NOTE — TELEPHONE ENCOUNTER
Caller: CISCO SIGALA    Relationship: Emergency Contact    Best call back number: [phone]    What was the call regarding: PATIENTS DAUGHTER STATED THAT WHEN THE PATIENT WAS SEEN YESTERDAY 10-, DR ZACARIAS STATED HE WOULD PRESCRIBE THE PATIENT N ANTIBIOTIC.    PATIENTS DAUGHTER STATED THERE WAS NO ANTIBIOTIC AT THE [facility] WHEN THEY WENT TO PICK IT UP.    Do you require a callback: PLEASE CALL TO ADVISE AND DISCUSS

## 2022-10-12 LAB
C TRACH RRNA CVX QL NAA+PROBE: NEGATIVE
CYTOLOGIST CVX/VAG CYTO: NORMAL
CYTOLOGY CVX/VAG DOC CYTO: NORMAL
CYTOLOGY CVX/VAG DOC THIN PREP: NORMAL
DX ICD CODE: NORMAL
HIV 1 & 2 AB SER-IMP: NORMAL
HPV I/H RISK 4 DNA CVX QL PROBE+SIG AMP: NEGATIVE
Lab: NORMAL
N GONORRHOEA RRNA CVX QL NAA+PROBE: NEGATIVE
OTHER STN SPEC: NORMAL
STAT OF ADQ CVX/VAG CYTO-IMP: NORMAL

## 2022-10-14 ENCOUNTER — HOSPITAL ENCOUNTER (OUTPATIENT)
Dept: MAMMOGRAPHY | Facility: HOSPITAL | Age: 57
Discharge: HOME | End: 2022-10-14
Admitting: FAMILY MEDICINE

## 2022-10-14 DIAGNOSIS — Z12.39 ENCOUNTER FOR SCREENING FOR MALIGNANT NEOPLASM OF BREAST, UNSPECIFIED SCREENING MODALITY: ICD-10-CM

## 2022-10-14 PROCEDURE — 77067 SCR MAMMO BI INCL CAD: CPT

## 2022-10-14 PROCEDURE — 77063 BREAST TOMOSYNTHESIS BI: CPT

## 2023-02-03 NOTE — PROGRESS NOTES
Subjective   Candice Swan is a 57 y.o. female.     Chief Complaint   Patient presents with   • Leg Pain     left       Leg Pain   The incident occurred more than 1 week ago. There was no injury mechanism. The pain is present in the left thigh, left leg, left knee, left ankle and left foot. The quality of the pain is described as aching and stabbing. The pain is at a severity of 5/10. The pain has been fluctuating since onset. She reports no foreign bodies present. The symptoms are aggravated by movement and weight bearing. She has tried nothing for the symptoms.            I personally reviewed and updated the patient's allergies, medications, problem list, and past medical, surgical, social, and family history. I have reviewed and confirmed the accuracy of the History of Present Illness and Review of Symptoms as documented by the MA/LPN/RN. Brandin Sunshine MD    Family History   Problem Relation Age of Onset   • Diabetes Mother    • Heart disease Mother        Social History     Tobacco Use   • Smoking status: Never   • Smokeless tobacco: Never   Vaping Use   • Vaping Use: Never used   Substance Use Topics   • Alcohol use: No   • Drug use: No       Past Surgical History:   Procedure Laterality Date   • CARDIOVASCULAR STRESS TEST  06/11/2019    abnormal   • CHOLECYSTECTOMY     • ECHO - CONVERTED  2019       Patient Active Problem List   Diagnosis   • Degeneration of intervertebral disc at C5-C6 level   • Numbness and tingling in left arm   • Umbilical hernia   • Osteoarthritis of both knees   • Class 3 severe obesity due to excess calories with serious comorbidity and body mass index (BMI) of 45.0 to 49.9 in adult (HCC)   • Abnormal nuclear stress test   • Fatigue   • Allergic rhinitis   • Fatty liver   • Mixed hyperlipidemia   • Empty sella ([facility])   • Cervical disc disease   • Lipoma   • GERD (gastroesophageal reflux disease)   • Spasm eyelid   • SOB (shortness of breath)   • Dysuria   • Annual visit for general  adult medical examination with abnormal findings   • Screening for malignant neoplasm of breast   • Screening for malignant neoplasm of cervix   • Viral upper respiratory tract infection   • Acute bacterial sinusitis   • Seasonal allergic rhinitis due to pollen   • Acute pain of left knee   • Neuropathy   • Screen for colon cancer   • Acute left-sided thoracic back pain         Current Outpatient Medications:   •  amitriptyline (ELAVIL) 25 MG tablet, Take 1 tablet by mouth Every Night., Disp: 30 tablet, Rfl: 5  •  ASPIRIN 81 PO, Take  by mouth., Disp: , Rfl:   •  cyclobenzaprine (FLEXERIL) 10 MG tablet, Take 1 tablet by mouth Daily As Needed for Muscle Spasms. 1 tablet to half tablet as needed nightly, Disp: 30 tablet, Rfl: 2  •  furosemide (LASIX) 20 MG tablet, Take 1 tablet by mouth Daily As Needed (swelling)., Disp: 30 tablet, Rfl: 5  •  potassium chloride (K-DUR,KLOR-CON) 20 MEQ CR tablet, Take 1 tablet by mouth Daily., Disp: 30 tablet, Rfl: 5  •  cephalexin (KEFLEX) 500 MG capsule, Take 1 capsule by mouth 3 (Three) Times a Day., Disp: 30 capsule, Rfl: 0  •  meloxicam (MOBIC) 15 MG tablet, Take 1 tablet by mouth Daily As Needed for Moderate Pain., Disp: 90 tablet, Rfl: 3         Review of Systems   Constitutional: Negative for chills and diaphoresis.   Eyes: Negative for visual disturbance.   Respiratory: Negative for shortness of breath.    Cardiovascular: Negative for chest pain and palpitations.   Gastrointestinal: Negative for abdominal pain and nausea.   Endocrine: Negative for polydipsia and polyphagia.   Musculoskeletal: Negative for neck stiffness.   Skin: Negative for color change and pallor.   Neurological: Negative for seizures and syncope.   Hematological: Negative for adenopathy.       I have reviewed and confirmed the accuracy of the ROS as documented by the MA/LPN/RN Brandin Sunshine MD      Objective   /76 (BP Location: Left arm, Patient Position: Sitting, Cuff Size: Adult)   Pulse 100    "Temp 98.6 °F (37 °C)   Resp 16   Ht 165.1 cm (65\")   Wt 133 kg (293 lb 6.4 oz)   LMP 08/01/2019   SpO2 95%   BMI 48.82 kg/m²   BP Readings from Last 3 Encounters:   02/06/23 122/76   10/05/22 130/70   08/08/22 116/76     Wt Readings from Last 3 Encounters:   02/06/23 133 kg (293 lb 6.4 oz)   10/05/22 135 kg (298 lb 9.6 oz)   08/08/22 135 kg (297 lb 9.6 oz)     Physical Exam  Constitutional:       Appearance: She is well-developed. She is not diaphoretic.   HENT:      Head: Normocephalic.      Right Ear: Tympanic membrane, ear canal and external ear normal.      Left Ear: Tympanic membrane, ear canal and external ear normal.      Nose: Nose normal.   Eyes:      General: Lids are normal.      Conjunctiva/sclera: Conjunctivae normal.      Pupils: Pupils are equal, round, and reactive to light.   Neck:      Thyroid: No thyromegaly.      Vascular: No carotid bruit or JVD.      Trachea: No tracheal deviation.   Cardiovascular:      Rate and Rhythm: Normal rate and regular rhythm.      Heart sounds: Normal heart sounds. No murmur heard.    No friction rub. No gallop.   Pulmonary:      Effort: Pulmonary effort is normal.      Breath sounds: Normal breath sounds. No stridor. No decreased breath sounds, wheezing or rales.   Abdominal:      General: Bowel sounds are normal. There is no distension.      Palpations: Abdomen is soft. There is no mass.      Tenderness: There is no abdominal tenderness. There is no guarding or rebound.      Hernia: No hernia is present.   Lymphadenopathy:      Head:      Right side of head: No submental, submandibular, tonsillar, preauricular, posterior auricular or occipital adenopathy.      Left side of head: No submental, submandibular, tonsillar, preauricular, posterior auricular or occipital adenopathy.      Cervical: No cervical adenopathy.   Skin:     General: Skin is warm and dry.      Coloration: Skin is not pale.   Neurological:      Mental Status: She is alert and oriented to " person, place, and time.      Cranial Nerves: No cranial nerve deficit.      Sensory: No sensory deficit.      Coordination: Coordination normal.      Gait: Gait normal.      Deep Tendon Reflexes: Reflexes are normal and symmetric.         Data / Lab Results:    Hemoglobin A1C   Date Value Ref Range Status   10/05/2022 6.1 % Final        Lab Results   Component Value Date    LDL 73 09/06/2022    LDL 62 05/02/2019    LDL 63 11/01/2016     Lab Results   Component Value Date    CHOL 129 05/02/2019    CHOL 136 11/01/2016     Lab Results   Component Value Date    TRIG 79 09/06/2022    TRIG 98 05/02/2019    TRIG 126 11/01/2016     Lab Results   Component Value Date    HDL 51 09/06/2022    HDL 49 (L) 05/02/2019    HDL 48 11/01/2016     No results found for: PSA  Lab Results   Component Value Date    WBC 6.5 09/06/2022    HGB 14.3 09/06/2022    HCT 44.1 09/06/2022    MCV 92 09/06/2022     09/06/2022     Lab Results   Component Value Date    TSH 2.850 09/06/2022      Lab Results   Component Value Date    GLUCOSE 130 (H) 09/06/2022    BUN 17 09/06/2022    CREATININE 0.54 (L) 09/06/2022    EGFRIFNONA 108 05/02/2019    EGFRIFAFRI 126 05/02/2019    BCR 31 (H) 09/06/2022    K 4.1 09/06/2022    CO2 23 09/06/2022    CALCIUM 9.5 09/06/2022    PROTENTOTREF 6.7 09/06/2022    ALBUMIN 4.0 09/06/2022    LABIL2 1.5 09/06/2022    AST 22 09/06/2022    ALT 35 (H) 09/06/2022     No results found for: FARHANA, RF, SEDRATE   No results found for: CRP   No results found for: IRON, TIBC, FERRITIN   No results found for: [id number]       Assessment & Plan      Medications        Problem List         LOS      Health maintenance.  Doing well, vaccines current.  Discussed calcium vitamin D.  D iscussed coated baby aspirin daily.  Discussed health maintenance, screening test last modification.  Mammogram scheduled/Pap sent.  Elevated blood sugar.  Borderline , A1c to 6.1.  Discussed diet, exercise, lifestyle modification, weight loss.   Follow-up recheck fasting blood sugar 6 months.  Elevated LFTs.  Mild, new onset.  DDx includes fatty liver add fish oil.  Follow-up recheck.  Consider further eval persistent elevation.  Left leg pain evaluated with numbness/tingling.    Improved today, x-ray with mild signs of lumbar degeneration, continue as needed nighttime muscle relaxant, rehabilitation exercise discussed, nightly amitriptyline.  Follow-up recheck.  Add as needed meloxicam.  Lower extremity edema.    Improved today, taking furosemide intermittently/as needed.  Benign exam today, no sign of DVT.  DDx includes venous insufficiency, sleep apnea.  Echo benign 2019.  Start Lasix/potassium/JOAQUIN hose.  Discussed low-sodium diet.  Recommend sleep eval she declines currently but states she will consider.  Follow-up recheck.  Consider further eval if persistent symptoms.  Allergic rhinitis.  Over-the-counter Claritin or Zyrtec.  Elevated fasting blood sugar.  Has been working on diet and exercise.  Follow-up recheck.  Umbilical hernia.  Followed by Dr. Diop, working on weight loss, monitoring.  Obesity.  Improved, working on diet and exercise, has lost weight.  Recommend bariatric surgery, sleep study she declines  Abnormal stress test.  Stress Cardiolite with abnormal area, subsequent echo normal.  Followed by cardiology.  Exercise has been recommended.  Thoracic back pain.  Strain.  Benign exam today.  Start as needed meloxicam.  Rehabilitation exercises discussed.  Add PRN muscle relaxer.  Follow-up recheck.  Consider imaging if persistent symptoms.  Osteoarthritis knees.  Improved on diclofenac as needed.  Colon cancer screening.  Recommend colonoscopy/Cologuard, Cologuard scheduled.  Acute bacterial sinusitis.  Start antibiotics.  Increase fluid intake.  Call return if fever or worsening symptoms.      Diagnoses and all orders for this visit:    1. Left leg pain (Primary)  -     meloxicam (MOBIC) 15 MG tablet; Take 1 tablet by mouth Daily As Needed  for Moderate Pain.  Dispense: 90 tablet; Refill: 3    2. Class 3 severe obesity due to excess calories with serious comorbidity and body mass index (BMI) of 45.0 to 49.9 in adult ([facility])  Assessment & Plan:  Patient's (Body mass index is 48.82 kg/m².) indicates that they are morbidly/severely obese (BMI > 40 or > 35 with obesity - related health condition) with health conditions that include dyslipidemias . Weight is unchanged. BMI  is above average; BMI management plan is completed. We discussed portion control and increasing exercise.       3. Left ankle swelling  -     cephalexin (KEFLEX) 500 MG capsule; Take 1 capsule by mouth 3 (Three) Times a Day.  Dispense: 30 capsule; Refill: 0  -     furosemide (LASIX) 20 MG tablet; Take 1 tablet by mouth Daily As Needed (swelling).  Dispense: 30 tablet; Refill: 5    4. Acute left-sided thoracic back pain            Expected course, medications, and adverse effects discussed.  Call or return if worsening or persistent symptoms.  I wore protective equipment throughout this patient encounter including a mask, gloves, and eye protection.  Hand hygiene was performed before donning protective equipment and after removal when leaving the room. The complete contents of the Assessment and Plan and Data/Lab Results as documented above have been reviewed and addressed by myself with the patient today as part of an ongoing evaluation / treatment plan.  If some of the documentation has been copied from a previous note and is unchanged it indicates that this problem / plan has been assessed today but is stable from a previous visit and no changes have been recommended.

## 2023-02-06 ENCOUNTER — OFFICE VISIT (OUTPATIENT)
Dept: FAMILY MEDICINE CLINIC | Facility: CLINIC | Age: 58
End: 2023-02-06
Payer: COMMERCIAL

## 2023-02-06 VITALS
RESPIRATION RATE: 16 BRPM | HEIGHT: 65 IN | DIASTOLIC BLOOD PRESSURE: 76 MMHG | BODY MASS INDEX: 48.82 KG/M2 | SYSTOLIC BLOOD PRESSURE: 122 MMHG | TEMPERATURE: 98.6 F | OXYGEN SATURATION: 95 % | WEIGHT: 293 LBS | HEART RATE: 100 BPM

## 2023-02-06 DIAGNOSIS — M54.6 ACUTE LEFT-SIDED THORACIC BACK PAIN: ICD-10-CM

## 2023-02-06 DIAGNOSIS — E66.01 CLASS 3 SEVERE OBESITY DUE TO EXCESS CALORIES WITH SERIOUS COMORBIDITY AND BODY MASS INDEX (BMI) OF 45.0 TO 49.9 IN ADULT: ICD-10-CM

## 2023-02-06 DIAGNOSIS — M25.472 LEFT ANKLE SWELLING: ICD-10-CM

## 2023-02-06 DIAGNOSIS — M79.605 LEFT LEG PAIN: Primary | ICD-10-CM

## 2023-02-06 PROCEDURE — 99213 OFFICE O/P EST LOW 20 MIN: CPT | Performed by: FAMILY MEDICINE

## 2023-02-06 RX ORDER — FUROSEMIDE 20 MG/1
20 TABLET ORAL DAILY PRN
Qty: 30 TABLET | Refills: 5 | Status: SHIPPED | OUTPATIENT
Start: 2023-02-06

## 2023-02-06 RX ORDER — CEPHALEXIN 500 MG/1
500 CAPSULE ORAL 3 TIMES DAILY
Qty: 30 CAPSULE | Refills: 0 | Status: SHIPPED | OUTPATIENT
Start: 2023-02-06 | End: 2023-03-01

## 2023-02-06 RX ORDER — MELOXICAM 15 MG/1
15 TABLET ORAL DAILY PRN
Qty: 90 TABLET | Refills: 3 | Status: SHIPPED | OUTPATIENT
Start: 2023-02-06

## 2023-02-06 NOTE — ASSESSMENT & PLAN NOTE
Patient's (Body mass index is 48.82 kg/m².) indicates that they are morbidly/severely obese (BMI > 40 or > 35 with obesity - related health condition) with health conditions that include dyslipidemias . Weight is unchanged. BMI  is above average; BMI management plan is completed. We discussed portion control and increasing exercise.

## 2023-03-01 ENCOUNTER — OFFICE VISIT (OUTPATIENT)
Dept: FAMILY MEDICINE CLINIC | Facility: CLINIC | Age: 58
End: 2023-03-01
Payer: COMMERCIAL

## 2023-03-01 VITALS
BODY MASS INDEX: 48.82 KG/M2 | HEIGHT: 65 IN | WEIGHT: 293 LBS | TEMPERATURE: 97.8 F | RESPIRATION RATE: 18 BRPM | SYSTOLIC BLOOD PRESSURE: 124 MMHG | DIASTOLIC BLOOD PRESSURE: 80 MMHG

## 2023-03-01 DIAGNOSIS — M50.322 DEGENERATION OF INTERVERTEBRAL DISC AT C5-C6 LEVEL: ICD-10-CM

## 2023-03-01 DIAGNOSIS — M17.0 OSTEOARTHRITIS OF BOTH KNEES, UNSPECIFIED OSTEOARTHRITIS TYPE: ICD-10-CM

## 2023-03-01 DIAGNOSIS — E66.01 CLASS 3 SEVERE OBESITY DUE TO EXCESS CALORIES WITH SERIOUS COMORBIDITY AND BODY MASS INDEX (BMI) OF 45.0 TO 49.9 IN ADULT: ICD-10-CM

## 2023-03-01 DIAGNOSIS — M25.561 ACUTE PAIN OF BOTH KNEES: Primary | ICD-10-CM

## 2023-03-01 DIAGNOSIS — K76.0 FATTY LIVER: ICD-10-CM

## 2023-03-01 DIAGNOSIS — M25.562 ACUTE PAIN OF BOTH KNEES: Primary | ICD-10-CM

## 2023-03-01 PROCEDURE — 99213 OFFICE O/P EST LOW 20 MIN: CPT | Performed by: FAMILY MEDICINE

## 2023-03-01 PROCEDURE — 20610 DRAIN/INJ JOINT/BURSA W/O US: CPT | Performed by: FAMILY MEDICINE

## 2023-03-01 RX ORDER — METHYLPREDNISOLONE ACETATE 40 MG/ML
40 INJECTION, SUSPENSION INTRA-ARTICULAR; INTRALESIONAL; INTRAMUSCULAR; SOFT TISSUE ONCE
Status: COMPLETED | OUTPATIENT
Start: 2023-03-01 | End: 2023-03-01

## 2023-03-01 RX ADMIN — METHYLPREDNISOLONE ACETATE 40 MG: 40 INJECTION, SUSPENSION INTRA-ARTICULAR; INTRALESIONAL; INTRAMUSCULAR; SOFT TISSUE at 11:39

## 2023-03-01 NOTE — ASSESSMENT & PLAN NOTE
Patient's (Body mass index is 49.59 kg/m².) indicates that they are morbidly/severely obese (BMI > 40 or > 35 with obesity - related health condition) with health conditions that include dyslipidemias . Weight is unchanged. BMI  is above average; BMI management plan is completed. We discussed portion control and increasing exercise.

## 2023-03-13 NOTE — PROGRESS NOTES
Subjective   Candice Swan is a 57 y.o. female.     Chief Complaint   Patient presents with   • Knee Pain     Bilateral        History of Present Illness  Candice is here with Luana to discuss left knee pain. Candice had both knee injected on 3/1/2023 by Dr Sunshine.   Knee Pain   The incident occurred more than 1 week ago. There was no injury mechanism. The pain is present in the left knee and right knee. The quality of the pain is described as aching and stabbing. The pain is at a severity of 9/10 (can be a 10). The pain is severe. The pain has been constant since onset. Associated symptoms include an inability to bear weight. She reports no foreign bodies present. The symptoms are aggravated by movement and weight bearing. She has tried rest, non-weight bearing and acetaminophen (Meloxicam 15mg, Knee brace on today) for the symptoms. The treatment provided mild relief.   Fatigue  This is a recurrent problem. The current episode started more than 1 month ago. Associated symptoms include fatigue and myalgias. Pertinent negatives include no abdominal pain, chest pain, chills, diaphoresis, fever, headaches or nausea. Nothing aggravates the symptoms.            I personally reviewed and updated the patient's allergies, medications, problem list, and past medical, surgical, social, and family history. I have reviewed and confirmed the accuracy of the History of Present Illness and Review of Symptoms as documented by the MA/LPN/RN. Brandin Sunshine MD    Family History   Problem Relation Age of Onset   • Diabetes Mother    • Heart disease Mother        Social History     Tobacco Use   • Smoking status: Never   • Smokeless tobacco: Never   Vaping Use   • Vaping Use: Never used   Substance Use Topics   • Alcohol use: No   • Drug use: No       Past Surgical History:   Procedure Laterality Date   • CARDIOVASCULAR STRESS TEST  06/11/2019    abnormal   • CHOLECYSTECTOMY     • ECHO - CONVERTED  2019       Patient Active Problem  List   Diagnosis   • Degeneration of intervertebral disc at C5-C6 level   • Numbness and tingling in left arm   • Umbilical hernia   • Osteoarthritis of both knees   • Class 3 severe obesity due to excess calories with serious comorbidity and body mass index (BMI) of 45.0 to 49.9 in adult (HCC)   • Abnormal nuclear stress test   • Fatigue   • Allergic rhinitis   • Fatty liver   • Mixed hyperlipidemia   • Empty sella (HCC)   • Cervical disc disease   • Lipoma   • GERD (gastroesophageal reflux disease)   • Spasm eyelid   • SOB (shortness of breath)   • Dysuria   • Annual visit for general adult medical examination with abnormal findings   • Screening for malignant neoplasm of breast   • Screening for malignant neoplasm of cervix   • Viral upper respiratory tract infection   • Acute bacterial sinusitis   • Seasonal allergic rhinitis due to pollen   • Acute pain of left knee   • Neuropathy   • Screen for colon cancer   • Acute left-sided thoracic back pain         Current Outpatient Medications:   •  amitriptyline (ELAVIL) 25 MG tablet, Take 1 tablet by mouth Every Night., Disp: 30 tablet, Rfl: 5  •  ASPIRIN 81 PO, Take  by mouth., Disp: , Rfl:   •  cyclobenzaprine (FLEXERIL) 10 MG tablet, Take 1 tablet by mouth Daily As Needed for Muscle Spasms. 1 tablet to half tablet as needed nightly, Disp: 30 tablet, Rfl: 2  •  furosemide (LASIX) 20 MG tablet, Take 1 tablet by mouth Daily As Needed (swelling)., Disp: 30 tablet, Rfl: 5  •  meloxicam (MOBIC) 15 MG tablet, Take 1 tablet by mouth Daily As Needed for Moderate Pain., Disp: 90 tablet, Rfl: 3  •  potassium chloride (K-DUR,KLOR-CON) 20 MEQ CR tablet, Take 1 tablet by mouth Daily., Disp: 30 tablet, Rfl: 5  •  albuterol sulfate  (90 Base) MCG/ACT inhaler, Inhale 2 puffs every 4-6 hours prn, Disp: 18 g, Rfl: 5         Review of Systems   Constitutional: Positive for fatigue. Negative for chills, diaphoresis and fever.   Eyes: Negative for visual disturbance.  "  Respiratory: Negative for shortness of breath.    Cardiovascular: Negative for chest pain and palpitations.   Gastrointestinal: Negative for abdominal pain and nausea.   Endocrine: Negative for polydipsia and polyphagia.   Musculoskeletal: Positive for myalgias. Negative for neck stiffness.   Skin: Negative for color change and pallor.   Neurological: Negative for seizures and syncope.   Hematological: Negative for adenopathy.   Psychiatric/Behavioral: Negative for hallucinations and suicidal ideas.       I have reviewed and confirmed the accuracy of the ROS as documented by the MA/LPN/RN Brandin Sunshine MD      Objective   /84 (BP Location: Left arm, Patient Position: Sitting, Cuff Size: Adult)   Pulse 112   Temp 97.3 °F (36.3 °C)   Resp 20   Ht 165.1 cm (65\")   Wt 135 kg (296 lb 12.8 oz)   LMP 08/01/2019   SpO2 97%   BMI 49.39 kg/m²   BP Readings from Last 3 Encounters:   03/14/23 122/84   03/01/23 124/80   02/06/23 122/76     Wt Readings from Last 3 Encounters:   03/14/23 135 kg (296 lb 12.8 oz)   03/01/23 135 kg (298 lb)   02/06/23 133 kg (293 lb 6.4 oz)     Physical Exam  Constitutional:       Appearance: Normal appearance. She is well-developed. She is not diaphoretic.   Cardiovascular:      Rate and Rhythm: Normal rate and regular rhythm.      Pulses: Normal pulses.      Heart sounds: Normal heart sounds, S1 normal and S2 normal. No murmur heard.    No friction rub. No gallop.   Pulmonary:      Effort: Pulmonary effort is normal. No accessory muscle usage.      Breath sounds: Normal breath sounds. No stridor. No decreased breath sounds, wheezing, rhonchi or rales.   Abdominal:      General: Bowel sounds are normal. There is no distension.      Palpations: Abdomen is soft. Abdomen is not rigid. There is no mass or pulsatile mass.      Tenderness: There is no abdominal tenderness. There is no guarding or rebound. Negative signs include Aceves's sign.      Hernia: No hernia is present. "   Musculoskeletal:      Right knee: Normal. No swelling, deformity, effusion or erythema. Normal range of motion. No tenderness. No medial joint line, lateral joint line, MCL, LCL or patellar tendon tenderness. No LCL laxity or MCL laxity. Normal patellar mobility.      Left knee: Normal. No swelling, deformity, effusion or erythema. Normal range of motion. No tenderness. No medial joint line, lateral joint line, MCL, LCL or patellar tendon tenderness. No LCL laxity or MCL laxity.Normal patellar mobility.      Comments: Anterior drawer and Lachman neg, Tomer neg   Skin:     General: Skin is warm and dry.      Coloration: Skin is not pale.   Neurological:      Mental Status: She is alert and oriented to person, place, and time.      Coordination: Coordination normal.      Gait: Gait normal.         Data / Lab Results:    Hemoglobin A1C   Date Value Ref Range Status   10/05/2022 6.1 % Final        Lab Results   Component Value Date    LDL 73 09/06/2022    LDL 62 05/02/2019    LDL 63 11/01/2016     Lab Results   Component Value Date    CHOL 129 05/02/2019    CHOL 136 11/01/2016     Lab Results   Component Value Date    TRIG 79 09/06/2022    TRIG 98 05/02/2019    TRIG 126 11/01/2016     Lab Results   Component Value Date    HDL 51 09/06/2022    HDL 49 (L) 05/02/2019    HDL 48 11/01/2016     No results found for: PSA  Lab Results   Component Value Date    WBC 6.5 09/06/2022    HGB 14.3 09/06/2022    HCT 44.1 09/06/2022    MCV 92 09/06/2022     09/06/2022     Lab Results   Component Value Date    TSH 2.850 09/06/2022      Lab Results   Component Value Date    GLUCOSE 130 (H) 09/06/2022    BUN 17 09/06/2022    CREATININE 0.54 (L) 09/06/2022    EGFRIFNONA 108 05/02/2019    EGFRIFAFRI 126 05/02/2019    BCR 31 (H) 09/06/2022    K 4.1 09/06/2022    CO2 23 09/06/2022    CALCIUM 9.5 09/06/2022    PROTENTOTREF 6.7 09/06/2022    ALBUMIN 4.0 09/06/2022    LABIL2 1.5 09/06/2022    AST 22 09/06/2022    ALT 35 (H) 09/06/2022      No results found for: FARHANA, RF, SEDRATE   No results found for: CRP   No results found for: IRON, TIBC, FERRITIN   No results found for: [id number]       Assessment & Plan      Medications        Problem List         LOS  Health maintenance.  Doing well, vaccines current.  Discussed calcium vitamin D.  D iscussed coated baby aspirin daily.  Discussed health maintenance, screening test last modification.  Mammogram scheduled/Pap sent.  Elevated blood sugar.  Borderline , A1c to 6.1.  Discussed diet, exercise, lifestyle modification, weight loss.  Follow-up recheck fasting blood sugar 6 months.  Elevated LFTs.  Mild, new onset.  DDx includes fatty liver add fish oil.  Follow-up recheck.  Consider further eval persistent elevation.  Left leg pain evaluated with numbness/tingling.    Improved today, x-ray with mild signs of lumbar degeneration, continue as needed nighttime muscle relaxant, rehabilitation exercise discussed, nightly amitriptyline.  Follow-up recheck.  Add as needed meloxicam.  Lower extremity edema.    Improved today, taking furosemide intermittently/as needed.  Benign exam today, no sign of DVT.  DDx includes venous insufficiency, sleep apnea.  Echo benign 2019.  Start Lasix/potassium/JOAQUIN hose.  Discussed low-sodium diet.  Recommend sleep eval she declines currently but states she will consider.  Follow-up recheck.  Consider further eval if persistent symptoms.  Allergic rhinitis.  Over-the-counter Claritin or Zyrtec.  Elevated fasting blood sugar.  Has been working on diet and exercise.  Follow-up recheck.  Umbilical hernia.  Followed by Dr. Diop, working on weight loss, monitoring.  Obesity.  Improved, working on diet and exercise, has lost weight.  Recommend bariatric surgery, sleep study she declines  Abnormal stress test.  Stress Cardiolite with abnormal area, subsequent echo normal.  Followed by cardiology.  Exercise has been recommended.  Thoracic back pain.  Strain.  Benign exam  today.  Start as needed meloxicam.  Rehabilitation exercises discussed.  Add PRN muscle relaxer.  Follow-up recheck.  Consider imaging if persistent symptoms.  Osteoarthritis knees.  Improved on meloxicam as needed, improved status post recent injection.  Still with pain limiting exercise check x-rays, start PT.  Follow-up recheck.  Consider orthopedics referral, repeat injection if persistent symptoms..  symptoms worse on right.  Ice, rehabilitation exercise discussed.  Call return if worsening symptoms.  Colon cancer screening.  Recommend colonoscopy/Cologuard, Cologuard scheduled.  Shortness of breath.  Multifactorial, likely secondary to deconditioning.  Has had cardiology eval.  DDx includes COPD add inhaler.  Increase exercise.  Consider PFTs.  Follow-up recheck.        Diagnoses and all orders for this visit:    1. Acute pain of left knee (Primary)  -     XR Knee Bilateral AP Standing    2. Class 3 severe obesity due to excess calories with serious comorbidity and body mass index (BMI) of 45.0 to 49.9 in adult ([facility])    3. Seasonal allergic rhinitis due to pollen  -     albuterol sulfate  (90 Base) MCG/ACT inhaler; Inhale 2 puffs every 4-6 hours prn  Dispense: 18 g; Refill: 5    4. Asthma, unspecified asthma severity, unspecified whether complicated, unspecified whether persistent  -     albuterol sulfate  (90 Base) MCG/ACT inhaler; Inhale 2 puffs every 4-6 hours prn  Dispense: 18 g; Refill: 5    5. SOB (shortness of breath)    6. Primary osteoarthritis of both knees              Expected course, medications, and adverse effects discussed.  Call or return if worsening or persistent symptoms.  I wore protective equipment throughout this patient encounter including a mask, gloves, and eye protection.  Hand hygiene was performed before donning protective equipment and after removal when leaving the room. The complete contents of the Assessment and Plan and Data/Lab Results as documented above have  been reviewed and addressed by myself with the patient today as part of an ongoing evaluation / treatment plan.  If some of the documentation has been copied from a previous note and is unchanged it indicates that this problem / plan has been assessed today but is stable from a previous visit and no changes have been recommended.

## 2023-03-14 ENCOUNTER — OFFICE VISIT (OUTPATIENT)
Dept: FAMILY MEDICINE CLINIC | Facility: CLINIC | Age: 58
End: 2023-03-14
Payer: COMMERCIAL

## 2023-03-14 VITALS
HEIGHT: 65 IN | BODY MASS INDEX: 48.82 KG/M2 | WEIGHT: 293 LBS | OXYGEN SATURATION: 97 % | TEMPERATURE: 97.3 F | RESPIRATION RATE: 20 BRPM | SYSTOLIC BLOOD PRESSURE: 122 MMHG | HEART RATE: 112 BPM | DIASTOLIC BLOOD PRESSURE: 84 MMHG

## 2023-03-14 DIAGNOSIS — M17.0 PRIMARY OSTEOARTHRITIS OF BOTH KNEES: ICD-10-CM

## 2023-03-14 DIAGNOSIS — J30.1 SEASONAL ALLERGIC RHINITIS DUE TO POLLEN: ICD-10-CM

## 2023-03-14 DIAGNOSIS — M25.562 ACUTE PAIN OF LEFT KNEE: Primary | ICD-10-CM

## 2023-03-14 DIAGNOSIS — J45.909 ASTHMA, UNSPECIFIED ASTHMA SEVERITY, UNSPECIFIED WHETHER COMPLICATED, UNSPECIFIED WHETHER PERSISTENT: ICD-10-CM

## 2023-03-14 DIAGNOSIS — E66.01 CLASS 3 SEVERE OBESITY DUE TO EXCESS CALORIES WITH SERIOUS COMORBIDITY AND BODY MASS INDEX (BMI) OF 45.0 TO 49.9 IN ADULT: ICD-10-CM

## 2023-03-14 DIAGNOSIS — R06.02 SOB (SHORTNESS OF BREATH): ICD-10-CM

## 2023-03-14 PROCEDURE — 99213 OFFICE O/P EST LOW 20 MIN: CPT | Performed by: FAMILY MEDICINE

## 2023-03-14 RX ORDER — ALBUTEROL SULFATE 90 UG/1
AEROSOL, METERED RESPIRATORY (INHALATION)
Qty: 18 G | Refills: 5 | Status: SHIPPED | OUTPATIENT
Start: 2023-03-14

## 2023-03-15 ENCOUNTER — HOSPITAL ENCOUNTER (OUTPATIENT)
Dept: GENERAL RADIOLOGY | Facility: HOSPITAL | Age: 58
Discharge: HOME | End: 2023-03-15
Admitting: FAMILY MEDICINE
Payer: COMMERCIAL

## 2023-03-15 PROCEDURE — 73565 X-RAY EXAM OF KNEES: CPT

## 2023-04-10 ENCOUNTER — TELEPHONE (OUTPATIENT)
Dept: FAMILY MEDICINE CLINIC | Facility: CLINIC | Age: 58
End: 2023-04-10
Payer: COMMERCIAL

## 2023-04-10 DIAGNOSIS — M17.0 PRIMARY OSTEOARTHRITIS OF BOTH KNEES: Primary | ICD-10-CM

## 2023-04-10 DIAGNOSIS — M25.561 ACUTE PAIN OF BOTH KNEES: ICD-10-CM

## 2023-04-10 DIAGNOSIS — M25.562 ACUTE PAIN OF BOTH KNEES: ICD-10-CM

## 2023-04-10 NOTE — TELEPHONE ENCOUNTER
Called and spoke with patient's daughter. Patient is still having lots of pain in knees, and would like a referral to [facility] in [place].

## 2023-04-10 NOTE — TELEPHONE ENCOUNTER
----- Message from Bria Sahu MA sent at 4/10/2023 12:30 PM EDT -----  Per Dr Sunshine Xrays show severe wear and tear. Is she having improvement? Next step would be referral to Ortho janice or adrien in [place] or Dr Bob if patient prefers to stay in [place]

## 2023-06-13 NOTE — PROGRESS NOTES
Subjective   Candice Swan is a 58 y.o. female.     Chief Complaint   Patient presents with    Knee Pain    Abdominal Pain       History of Present Illness  Candice is here with Luana to discuss left knee pain. Candice had both knee injected on 3/1/2023 by Dr Sunshine. She states she is seeing orthopedics and they would like to do another injection in 6 months.     Candice presents to the office today with a umblical hernia and has scatched the surface and it's open.   Knee Pain   The incident occurred more than 1 week ago. There was no injury mechanism. The pain is present in the left knee and right knee. The quality of the pain is described as aching and stabbing. The pain is at a severity of 3/10 (can be a 10). The pain is mild. The pain has been Improving since onset. Pertinent negatives include no inability to bear weight, numbness or tingling. She reports no foreign bodies present. The symptoms are aggravated by movement and weight bearing. She has tried rest, non-weight bearing and acetaminophen (Meloxicam 15mg, Knee brace on today) for the symptoms. The treatment provided mild relief.   Fatigue  This is a recurrent problem. The current episode started more than 1 month ago. Associated symptoms include fatigue, myalgias and vomiting. Pertinent negatives include no abdominal pain, chest pain, chills, congestion, coughing, diaphoresis, fever, headaches, nausea or numbness. The symptoms are aggravated by eating. She has tried nothing for the symptoms.          I personally reviewed and updated the patient's allergies, medications, problem list, and past medical, surgical, social, and family history. I have reviewed and confirmed the accuracy of the History of Present Illness and Review of Symptoms as documented by the MA/WANN/RN. Brandin Sunshine MD    Family History   Problem Relation Age of Onset    Diabetes Mother     Heart disease Mother        Social History     Tobacco Use    Smoking status: Never    Smokeless  tobacco: Never   Vaping Use    Vaping Use: Never used   Substance Use Topics    Alcohol use: No    Drug use: No       Past Surgical History:   Procedure Laterality Date    CARDIOVASCULAR STRESS TEST  06/11/2019    abnormal    CHOLECYSTECTOMY      ECHO - CONVERTED  2019       Patient Active Problem List   Diagnosis    Degeneration of intervertebral disc at C5-C6 level    Numbness and tingling in left arm    Umbilical hernia    Osteoarthritis of both knees    Class 3 severe obesity due to excess calories with serious comorbidity and body mass index (BMI) of 45.0 to 49.9 in adult    Abnormal nuclear stress test    Fatigue    Allergic rhinitis    Fatty liver    Mixed hyperlipidemia    Empty sella    Cervical disc disease    Lipoma    GERD (gastroesophageal reflux disease)    Spasm eyelid    SOB (shortness of breath)    Dysuria    Annual visit for general adult medical examination with abnormal findings    Screening for malignant neoplasm of breast    Screening for malignant neoplasm of cervix    Viral upper respiratory tract infection    Acute bacterial sinusitis    Seasonal allergic rhinitis due to pollen    Acute pain of left knee    Neuropathy    Screen for colon cancer    Acute left-sided thoracic back pain         Current Outpatient Medications:     albuterol sulfate  (90 Base) MCG/ACT inhaler, Inhale 2 puffs every 4-6 hours prn, Disp: 18 g, Rfl: 5    ASPIRIN 81 PO, Take  by mouth., Disp: , Rfl:     cyclobenzaprine (FLEXERIL) 10 MG tablet, Take 1 tablet by mouth Daily As Needed for Muscle Spasms. 1 tablet to half tablet as needed nightly, Disp: 30 tablet, Rfl: 2    furosemide (LASIX) 20 MG tablet, Take 1 tablet by mouth Daily As Needed (swelling)., Disp: 30 tablet, Rfl: 5    meloxicam (MOBIC) 15 MG tablet, Take 1 tablet by mouth Daily As Needed for Moderate Pain., Disp: 90 tablet, Rfl: 3    potassium chloride (K-DUR,KLOR-CON) 20 MEQ CR tablet, Take 1 tablet by mouth Daily., Disp: 30 tablet, Rfl: 5     "amitriptyline (ELAVIL) 25 MG tablet, Take 1 tablet by mouth Every Night. (Patient not taking: Reported on 6/14/2023), Disp: 30 tablet, Rfl: 5    doxycycline (MONODOX) 100 MG capsule, Take 1 capsule by mouth 2 (Two) Times a Day., Disp: 20 capsule, Rfl: 0    sulfacetamide (BLEPH-10) 10 % ophthalmic solution, Apply 1 drop to eye(s) as directed by provider Every 3 (Three) Hours., Disp: 15 mL, Rfl: 0         Review of Systems   Constitutional:  Positive for fatigue. Negative for chills, diaphoresis and fever.   HENT:  Negative for congestion.    Eyes:  Positive for pain. Negative for visual disturbance.   Respiratory:  Negative for cough and shortness of breath.    Cardiovascular:  Negative for chest pain and palpitations.   Gastrointestinal:  Positive for vomiting. Negative for abdominal pain and nausea.   Endocrine: Negative for polydipsia and polyphagia.   Musculoskeletal:  Positive for myalgias. Negative for neck stiffness.   Skin:  Negative for color change and pallor.   Neurological:  Negative for tingling, seizures, syncope and numbness.   Hematological:  Negative for adenopathy.   Psychiatric/Behavioral:  Negative for hallucinations and suicidal ideas.      I have reviewed and confirmed the accuracy of the ROS as documented by the MA/LPN/RN Brandin Sunshine MD      Objective   /80   Pulse 96   Temp 98.7 °F (37.1 °C)   Resp 16   Ht 165.1 cm (65\")   Wt 132 kg (290 lb)   LMP 08/01/2019   SpO2 99% Comment: room  BMI 48.26 kg/m²   BP Readings from Last 3 Encounters:   06/14/23 118/80   03/14/23 122/84   03/01/23 124/80     Wt Readings from Last 3 Encounters:   06/14/23 132 kg (290 lb)   03/14/23 135 kg (296 lb 12.8 oz)   03/01/23 135 kg (298 lb)     Physical Exam  Constitutional:       Appearance: Normal appearance. She is well-developed. She is not diaphoretic.   Cardiovascular:      Rate and Rhythm: Normal rate and regular rhythm.      Pulses: Normal pulses.      Heart sounds: Normal heart sounds, S1 " normal and S2 normal. No murmur heard.    No friction rub. No gallop.   Pulmonary:      Effort: Pulmonary effort is normal. No accessory muscle usage.      Breath sounds: Normal breath sounds. No stridor. No decreased breath sounds, wheezing, rhonchi or rales.   Abdominal:      General: Bowel sounds are normal. There is no distension.      Palpations: Abdomen is soft. Abdomen is not rigid. There is no mass or pulsatile mass.      Tenderness: There is no abdominal tenderness. There is no guarding or rebound. Negative signs include Aceves's sign.      Hernia: No hernia is present.   Skin:     General: Skin is warm and dry.      Coloration: Skin is not pale.      Comments: Small area cellulitis overlying umbilical hernia / complicating abrasion, no induration or fluctuance    Neurological:      Mental Status: She is alert and oriented to person, place, and time.      Coordination: Coordination normal.      Gait: Gait normal.       Data / Lab Results:    Hemoglobin A1C   Date Value Ref Range Status   10/05/2022 6.1 % Final        Lab Results   Component Value Date    LDL 73 09/06/2022    LDL 62 05/02/2019    LDL 63 11/01/2016     Lab Results   Component Value Date    CHOL 129 05/02/2019    CHOL 136 11/01/2016     Lab Results   Component Value Date    TRIG 79 09/06/2022    TRIG 98 05/02/2019    TRIG 126 11/01/2016     Lab Results   Component Value Date    HDL 51 09/06/2022    HDL 49 (L) 05/02/2019    HDL 48 11/01/2016     No results found for: PSA  Lab Results   Component Value Date    WBC 6.5 09/06/2022    HGB 14.3 09/06/2022    HCT 44.1 09/06/2022    MCV 92 09/06/2022     09/06/2022     Lab Results   Component Value Date    TSH 2.850 09/06/2022      Lab Results   Component Value Date    GLUCOSE 130 (H) 09/06/2022    BUN 17 09/06/2022    CREATININE 0.54 (L) 09/06/2022    EGFRIFNONA 108 05/02/2019    EGFRIFAFRI 126 05/02/2019    BCR 31 (H) 09/06/2022    K 4.1 09/06/2022    CO2 23 09/06/2022    CALCIUM 9.5  09/06/2022    PROTENTOTREF 6.7 09/06/2022    ALBUMIN 4.0 09/06/2022    LABIL2 1.5 09/06/2022    AST 22 09/06/2022    ALT 35 (H) 09/06/2022     No results found for: FARHANA, RF, SEDRATE   No results found for: CRP   No results found for: IRON, TIBC, FERRITIN   No results found for: [id number]       Assessment & Plan      Medications        Problem List         LOS    Health maintenance.  Doing well, vaccines current.  Discussed calcium vitamin D.  D iscussed coated baby aspirin daily.  Discussed health maintenance, screening test last modification.  Mammogram scheduled/Pap sent.  Elevated blood sugar.  Borderline , A1c to 6.1.  Discussed diet, exercise, lifestyle modification, weight loss.  Follow-up recheck fasting blood sugar 6 months.  Elevated LFTs.  Mild, new onset.  DDx includes fatty liver add fish oil.  Follow-up recheck.  Consider further eval persistent elevation.  Left leg pain evaluated with numbness/tingling.    Improved today, x-ray with mild signs of lumbar degeneration, continue as needed nighttime muscle relaxant, rehabilitation exercise discussed, nightly amitriptyline.  Follow-up recheck.  Add as needed meloxicam.  Lower extremity edema.    Improved today, taking furosemide intermittently/as needed.  Benign exam today, no sign of DVT.  DDx includes venous insufficiency, sleep apnea.  Echo benign 2019.  Start Lasix/potassium/JOAQUIN hose.  Discussed low-sodium diet.  Recommend sleep eval she declines currently but states she will consider.  Follow-up recheck.  Consider further eval if persistent symptoms.  Allergic rhinitis.  Over-the-counter Claritin or Zyrtec.  Elevated fasting blood sugar.  Has been working on diet and exercise.  Follow-up recheck.  Umbilical hernia.  Stable today.  Followed by Dr. Diop, working on weight loss, monitoring. S/s incarceration discussed.  Obesity.  Improved, working on diet and exercise, has lost weight.  Recommend bariatric surgery, sleep study she declines.  Add  ozempic.   Abnormal stress test.  Stress Cardiolite with abnormal area, subsequent echo normal.  Followed by cardiology.  Exercise has been recommended.  Thoracic back pain.  Strain.  Benign exam today.  Start as needed meloxicam.  Rehabilitation exercises discussed.  Add PRN muscle relaxer.  Follow-up recheck.  Consider imaging if persistent symptoms.  Osteoarthritis knees.  Improved today s/p cartilage ijxn, followed by ortho.  Improved on meloxicam as needed, improved status post recent injection.  Still with pain limiting exercise check x-rays, start PT.  Follow-up recheck.  Consider orthopedics referral, repeat injection if persistent symptoms..  symptoms worse on right.  Ice, rehabilitation exercise discussed.  Call return if worsening symptoms.  Colon cancer screening.  Recommend colonoscopy/Cologuard, Cologuard scheduled.  Shortness of breath.  Multifactorial, likely secondary to deconditioning.  Has had cardiology eval.  DDx includes COPD add inhaler.  Increase exercise.  Consider PFTs.  Follow-up recheck.  Cellulitis abdomen.  Overlying hernia, complicating abrasion. Start antibiotics.  topical care discussed.   Call / return if worsening symptoms.   Viral conjunctivitis.  Benign exam today, start antibiotics.   Call or return if worsening or persistent symptoms.     Diagnoses and all orders for this visit:    1. Acute pain of left knee (Primary)    2. Class 3 severe obesity due to excess calories with serious comorbidity and body mass index (BMI) of 45.0 to 49.9 in adult    3. Cellulitis, unspecified cellulitis site  -     doxycycline (MONODOX) 100 MG capsule; Take 1 capsule by mouth 2 (Two) Times a Day.  Dispense: 20 capsule; Refill: 0    4. Eye problem  -     sulfacetamide (BLEPH-10) 10 % ophthalmic solution; Apply 1 drop to eye(s) as directed by provider Every 3 (Three) Hours.  Dispense: 15 mL; Refill: 0              Expected course, medications, and adverse effects discussed.  Call or return if  worsening or persistent symptoms.  I wore protective equipment throughout this patient encounter including a mask, gloves, and eye protection.  Hand hygiene was performed before donning protective equipment and after removal when leaving the room. The complete contents of the Assessment and Plan and Data/Lab Results as documented above have been reviewed and addressed by myself with the patient today as part of an ongoing evaluation / treatment plan.  If some of the documentation has been copied from a previous note and is unchanged it indicates that this problem / plan has been assessed today but is stable from a previous visit and no changes have been recommended.

## 2023-06-14 ENCOUNTER — OFFICE VISIT (OUTPATIENT)
Dept: FAMILY MEDICINE CLINIC | Facility: CLINIC | Age: 58
End: 2023-06-14
Payer: COMMERCIAL

## 2023-06-14 VITALS
DIASTOLIC BLOOD PRESSURE: 80 MMHG | OXYGEN SATURATION: 99 % | WEIGHT: 290 LBS | BODY MASS INDEX: 48.32 KG/M2 | TEMPERATURE: 98.7 F | HEART RATE: 96 BPM | SYSTOLIC BLOOD PRESSURE: 118 MMHG | RESPIRATION RATE: 16 BRPM | HEIGHT: 65 IN

## 2023-06-14 DIAGNOSIS — M25.562 ACUTE PAIN OF LEFT KNEE: Primary | ICD-10-CM

## 2023-06-14 DIAGNOSIS — E66.01 CLASS 3 SEVERE OBESITY DUE TO EXCESS CALORIES WITH SERIOUS COMORBIDITY AND BODY MASS INDEX (BMI) OF 45.0 TO 49.9 IN ADULT: ICD-10-CM

## 2023-06-14 DIAGNOSIS — H57.9 EYE PROBLEM: ICD-10-CM

## 2023-06-14 DIAGNOSIS — L03.90 CELLULITIS, UNSPECIFIED CELLULITIS SITE: ICD-10-CM

## 2023-06-14 PROCEDURE — 99213 OFFICE O/P EST LOW 20 MIN: CPT | Performed by: FAMILY MEDICINE

## 2023-06-14 RX ORDER — DOXYCYCLINE 100 MG/1
100 CAPSULE ORAL 2 TIMES DAILY
Qty: 20 CAPSULE | Refills: 0 | Status: SHIPPED | OUTPATIENT
Start: 2023-06-14

## 2023-06-14 RX ORDER — SULFACETAMIDE SODIUM 100 MG/ML
1 SOLUTION/ DROPS OPHTHALMIC
Qty: 15 ML | Refills: 0 | Status: SHIPPED | OUTPATIENT
Start: 2023-06-14

## 2023-06-19 ENCOUNTER — TELEPHONE (OUTPATIENT)
Dept: FAMILY MEDICINE CLINIC | Facility: CLINIC | Age: 58
End: 2023-06-19
Payer: COMMERCIAL

## 2023-06-28 PROBLEM — N39.0 URINARY TRACT INFECTION WITH HEMATURIA: Status: ACTIVE | Noted: 2023-06-28

## 2023-06-28 PROBLEM — R31.9 URINARY TRACT INFECTION WITH HEMATURIA: Status: ACTIVE | Noted: 2023-06-28

## 2023-07-14 PROBLEM — N39.0 URINARY TRACT INFECTION WITH HEMATURIA: Status: RESOLVED | Noted: 2023-06-28 | Resolved: 2023-07-14

## 2023-07-14 PROBLEM — R31.9 URINARY TRACT INFECTION WITH HEMATURIA: Status: RESOLVED | Noted: 2023-06-28 | Resolved: 2023-07-14

## 2023-07-14 PROBLEM — J06.9 VIRAL UPPER RESPIRATORY TRACT INFECTION: Status: RESOLVED | Noted: 2020-07-07 | Resolved: 2023-07-14

## 2023-07-17 ENCOUNTER — OFFICE VISIT (OUTPATIENT)
Dept: FAMILY MEDICINE CLINIC | Facility: CLINIC | Age: 58
End: 2023-07-17
Payer: COMMERCIAL

## 2023-07-17 VITALS
WEIGHT: 293 LBS | HEART RATE: 110 BPM | HEIGHT: 65 IN | RESPIRATION RATE: 20 BRPM | TEMPERATURE: 98.4 F | BODY MASS INDEX: 48.82 KG/M2 | OXYGEN SATURATION: 98 %

## 2023-07-17 DIAGNOSIS — K42.0 UMBILICAL HERNIA WITH OBSTRUCTION, WITHOUT GANGRENE: Primary | ICD-10-CM

## 2023-07-17 DIAGNOSIS — K64.9 HEMORRHOIDS, UNSPECIFIED HEMORRHOID TYPE: ICD-10-CM

## 2023-07-17 DIAGNOSIS — E78.2 MIXED HYPERLIPIDEMIA: ICD-10-CM

## 2023-07-17 DIAGNOSIS — M79.605 LEFT LEG PAIN: ICD-10-CM

## 2023-07-17 DIAGNOSIS — R53.83 MALAISE AND FATIGUE: ICD-10-CM

## 2023-07-17 DIAGNOSIS — E07.9 THYROID DISORDER: ICD-10-CM

## 2023-07-17 DIAGNOSIS — R58 HEMORRHAGE: ICD-10-CM

## 2023-07-17 DIAGNOSIS — N39.0 URINARY TRACT INFECTION WITH HEMATURIA, SITE UNSPECIFIED: ICD-10-CM

## 2023-07-17 DIAGNOSIS — Z12.11 SCREEN FOR COLON CANCER: ICD-10-CM

## 2023-07-17 DIAGNOSIS — E66.01 CLASS 3 SEVERE OBESITY DUE TO EXCESS CALORIES WITH SERIOUS COMORBIDITY AND BODY MASS INDEX (BMI) OF 45.0 TO 49.9 IN ADULT: ICD-10-CM

## 2023-07-17 DIAGNOSIS — R53.81 MALAISE AND FATIGUE: ICD-10-CM

## 2023-07-17 DIAGNOSIS — R31.9 URINARY TRACT INFECTION WITH HEMATURIA, SITE UNSPECIFIED: ICD-10-CM

## 2023-07-17 PROCEDURE — 99214 OFFICE O/P EST MOD 30 MIN: CPT | Performed by: FAMILY MEDICINE

## 2023-07-17 RX ORDER — MELOXICAM 15 MG/1
15 TABLET ORAL DAILY PRN
Qty: 90 TABLET | Refills: 1 | Status: SHIPPED | OUTPATIENT
Start: 2023-07-17

## 2023-07-17 RX ORDER — HYDROCORTISONE ACETATE PRAMOXINE HCL 1; 1 G/100G; G/100G
CREAM TOPICAL 2 TIMES DAILY
Qty: 30 G | Refills: 2 | Status: CANCELLED | OUTPATIENT
Start: 2023-07-17

## 2023-07-17 RX ORDER — HYDROCORTISONE ACETATE 25 MG/1
25 SUPPOSITORY RECTAL 2 TIMES DAILY
Qty: 24 EACH | Refills: 5 | Status: SHIPPED | OUTPATIENT
Start: 2023-07-17

## 2023-07-18 LAB
ALBUMIN SERPL-MCNC: 3.9 G/DL (ref 3.8–4.9)
ALBUMIN/GLOB SERPL: 1.4 {RATIO} (ref 1.2–2.2)
ALP SERPL-CCNC: 117 IU/L (ref 44–121)
ALT SERPL-CCNC: 22 IU/L (ref 0–32)
AST SERPL-CCNC: 14 IU/L (ref 0–40)
BASOPHILS # BLD AUTO: 0 X10E3/UL (ref 0–0.2)
BASOPHILS NFR BLD AUTO: 0 %
BILIRUB SERPL-MCNC: 0.4 MG/DL (ref 0–1.2)
BUN SERPL-MCNC: 12 MG/DL (ref 6–24)
BUN/CREAT SERPL: 20 (ref 9–23)
CALCIUM SERPL-MCNC: 9.4 MG/DL (ref 8.7–10.2)
CHLORIDE SERPL-SCNC: 101 MMOL/L (ref 96–106)
CO2 SERPL-SCNC: 26 MMOL/L (ref 20–29)
CREAT SERPL-MCNC: 0.6 MG/DL (ref 0.57–1)
EGFRCR SERPLBLD CKD-EPI 2021: 105 ML/MIN/1.73
EOSINOPHIL # BLD AUTO: 0.1 X10E3/UL (ref 0–0.4)
EOSINOPHIL NFR BLD AUTO: 1 %
ERYTHROCYTE [DISTWIDTH] IN BLOOD BY AUTOMATED COUNT: 12.6 % (ref 11.7–15.4)
GLOBULIN SER CALC-MCNC: 2.8 G/DL (ref 1.5–4.5)
GLUCOSE SERPL-MCNC: 125 MG/DL (ref 70–99)
HCT VFR BLD AUTO: 41.9 % (ref 34–46.6)
HGB BLD-MCNC: 14 G/DL (ref 11.1–15.9)
IMM GRANULOCYTES # BLD AUTO: 0 X10E3/UL (ref 0–0.1)
IMM GRANULOCYTES NFR BLD AUTO: 0 %
LYMPHOCYTES # BLD AUTO: 2.1 X10E3/UL (ref 0.7–3.1)
LYMPHOCYTES NFR BLD AUTO: 25 %
MCH RBC QN AUTO: 30 PG (ref 26.6–33)
MCHC RBC AUTO-ENTMCNC: 33.4 G/DL (ref 31.5–35.7)
MCV RBC AUTO: 90 FL (ref 79–97)
MONOCYTES # BLD AUTO: 0.5 X10E3/UL (ref 0.1–0.9)
MONOCYTES NFR BLD AUTO: 6 %
NEUTROPHILS # BLD AUTO: 5.6 X10E3/UL (ref 1.4–7)
NEUTROPHILS NFR BLD AUTO: 68 %
PLATELET # BLD AUTO: 251 X10E3/UL (ref 150–450)
POTASSIUM SERPL-SCNC: 4 MMOL/L (ref 3.5–5.2)
PROT SERPL-MCNC: 6.7 G/DL (ref 6–8.5)
RBC # BLD AUTO: 4.66 X10E6/UL (ref 3.77–5.28)
SODIUM SERPL-SCNC: 141 MMOL/L (ref 134–144)
WBC # BLD AUTO: 8.3 X10E3/UL (ref 3.4–10.8)

## 2023-07-26 ENCOUNTER — HOSPITAL ENCOUNTER (OUTPATIENT)
Dept: CT IMAGING | Facility: HOSPITAL | Age: 58
Discharge: HOME | End: 2023-07-26
Admitting: FAMILY MEDICINE
Payer: COMMERCIAL

## 2023-07-26 DIAGNOSIS — K42.0 UMBILICAL HERNIA WITH OBSTRUCTION, WITHOUT GANGRENE: ICD-10-CM

## 2023-07-26 DIAGNOSIS — R10.9 ABDOMINAL PAIN, UNSPECIFIED ABDOMINAL LOCATION: ICD-10-CM

## 2023-07-26 PROCEDURE — 74176 CT ABD & PELVIS W/O CONTRAST: CPT

## 2023-08-04 ENCOUNTER — HOSPITAL ENCOUNTER (EMERGENCY)
Facility: HOSPITAL | Age: 58
Discharge: HOME | End: 2023-08-04
Attending: EMERGENCY MEDICINE
Payer: COMMERCIAL

## 2023-08-04 ENCOUNTER — APPOINTMENT (OUTPATIENT)
Dept: CT IMAGING | Facility: HOSPITAL | Age: 58
End: 2023-08-04
Payer: COMMERCIAL

## 2023-08-04 VITALS
SYSTOLIC BLOOD PRESSURE: 140 MMHG | RESPIRATION RATE: 18 BRPM | DIASTOLIC BLOOD PRESSURE: 78 MMHG | BODY MASS INDEX: 48.82 KG/M2 | HEART RATE: 91 BPM | HEIGHT: 65 IN | WEIGHT: 293 LBS | OXYGEN SATURATION: 94 % | TEMPERATURE: 97.8 F

## 2023-08-04 DIAGNOSIS — H52.531 CILIARY MUSCLE SPASM OF RIGHT EYE: Primary | ICD-10-CM

## 2023-08-04 LAB
ALBUMIN SERPL-MCNC: 3.6 G/DL (ref 3.5–5.2)
ALBUMIN/GLOB SERPL: 1.1 G/DL
ALP SERPL-CCNC: 102 U/L (ref 39–117)
ALT SERPL W P-5'-P-CCNC: 25 U/L (ref 1–33)
ANION GAP SERPL CALCULATED.3IONS-SCNC: 10 MMOL/L (ref 5–15)
AST SERPL-CCNC: 16 U/L (ref 1–32)
BASOPHILS # BLD AUTO: 0.1 10*3/MM3 (ref 0–0.2)
BASOPHILS NFR BLD AUTO: 0.8 % (ref 0–1.5)
BILIRUB SERPL-MCNC: 0.3 MG/DL (ref 0–1.2)
BUN SERPL-MCNC: 22 MG/DL (ref 6–20)
BUN/CREAT SERPL: 37.3 (ref 7–25)
CALCIUM SPEC-SCNC: 9.3 MG/DL (ref 8.6–10.5)
CHLORIDE SERPL-SCNC: 102 MMOL/L (ref 98–107)
CO2 SERPL-SCNC: 26 MMOL/L (ref 22–29)
CREAT SERPL-MCNC: 0.59 MG/DL (ref 0.57–1)
DEPRECATED RDW RBC AUTO: 45.9 FL (ref 37–54)
EGFRCR SERPLBLD CKD-EPI 2021: 105.3 ML/MIN/1.73
EOSINOPHIL # BLD AUTO: 0.2 10*3/MM3 (ref 0–0.4)
EOSINOPHIL NFR BLD AUTO: 1.7 % (ref 0.3–6.2)
ERYTHROCYTE [DISTWIDTH] IN BLOOD BY AUTOMATED COUNT: 13.7 % (ref 12.3–15.4)
GLOBULIN UR ELPH-MCNC: 3.2 GM/DL
GLUCOSE SERPL-MCNC: 118 MG/DL (ref 65–99)
HCT VFR BLD AUTO: 42 % (ref 34–46.6)
HGB BLD-MCNC: 13.8 G/DL (ref 12–15.9)
LYMPHOCYTES # BLD AUTO: 2.5 10*3/MM3 (ref 0.7–3.1)
LYMPHOCYTES NFR BLD AUTO: 28.6 % (ref 19.6–45.3)
MCH RBC QN AUTO: 29.8 PG (ref 26.6–33)
MCHC RBC AUTO-ENTMCNC: 32.8 G/DL (ref 31.5–35.7)
MCV RBC AUTO: 91 FL (ref 79–97)
MONOCYTES # BLD AUTO: 0.7 10*3/MM3 (ref 0.1–0.9)
MONOCYTES NFR BLD AUTO: 7.7 % (ref 5–12)
NEUTROPHILS NFR BLD AUTO: 5.4 10*3/MM3 (ref 1.7–7)
NEUTROPHILS NFR BLD AUTO: 61.2 % (ref 42.7–76)
NRBC BLD AUTO-RTO: 0.1 /100 WBC (ref 0–0.2)
PLATELET # BLD AUTO: 247 10*3/MM3 (ref 140–450)
PMV BLD AUTO: 8.4 FL (ref 6–12)
POTASSIUM SERPL-SCNC: 3.9 MMOL/L (ref 3.5–5.2)
PROT SERPL-MCNC: 6.8 G/DL (ref 6–8.5)
RBC # BLD AUTO: 4.62 10*6/MM3 (ref 3.77–5.28)
SODIUM SERPL-SCNC: 138 MMOL/L (ref 136–145)
WBC NRBC COR # BLD: 8.8 10*3/MM3 (ref 3.4–10.8)

## 2023-08-04 PROCEDURE — 80053 COMPREHEN METABOLIC PANEL: CPT | Performed by: NURSE PRACTITIONER

## 2023-08-04 PROCEDURE — 70450 CT HEAD/BRAIN W/O DYE: CPT

## 2023-08-04 PROCEDURE — 85025 COMPLETE CBC W/AUTO DIFF WBC: CPT | Performed by: NURSE PRACTITIONER

## 2023-08-04 PROCEDURE — 99284 EMERGENCY DEPT VISIT MOD MDM: CPT

## 2023-08-04 RX ORDER — SODIUM CHLORIDE 0.9 % (FLUSH) 0.9 %
10 SYRINGE (ML) INJECTION AS NEEDED
Status: DISCONTINUED | OUTPATIENT
Start: 2023-08-04 | End: 2023-08-05 | Stop reason: HOSPADM

## 2023-08-05 NOTE — ED PROVIDER NOTES
Subjective   History of Present Illness  Patient is a 58-year-old obese female who states she has had right eye twitching and some intermittent pulling of the muscles in her right eye for several weeks.  She states she has been evaluated by an eye doctor and states the symptoms continue and they are bothersome.  Patient denies headache she denies nausea vomiting she denies change in vision    Patient is  with first language as Croatian and her son at bedside provided interpretation she was offered the iPad  but wished her son to interpret    Review of Systems   Constitutional:  Negative for chills, fatigue and fever.   HENT:  Negative for congestion, tinnitus and trouble swallowing.    Eyes:  Negative for photophobia, pain, discharge, redness, itching and visual disturbance.        Right eye twitching   Respiratory:  Negative for cough and shortness of breath.    Cardiovascular:  Negative for chest pain and palpitations.   Gastrointestinal:  Negative for abdominal pain, diarrhea, nausea and vomiting.   Genitourinary:  Negative for dysuria, frequency and urgency.   Musculoskeletal:  Negative for back pain, joint swelling and myalgias.   Skin:  Negative for rash.   Neurological:  Negative for dizziness and headaches.   Psychiatric/Behavioral:  Negative for confusion.    All other systems reviewed and are negative.    Past Medical History:   Diagnosis Date    Allergic rhinitis     Cervical disc disease     Empty sella     Fatty liver     Lipoma     Mixed hyperlipidemia     SOB (shortness of breath)     Umbilical hernia        No Known Allergies    Past Surgical History:   Procedure Laterality Date    CARDIOVASCULAR STRESS TEST  06/11/2019    abnormal    CHOLECYSTECTOMY      ECHO - CONVERTED  2019       Family History   Problem Relation Age of Onset    Diabetes Mother     Heart disease Mother        Social History     Socioeconomic History    Marital status:     Number of children: 3    Years of  education: 6TH GRADE   Tobacco Use    Smoking status: Never     Passive exposure: Never    Smokeless tobacco: Never   Vaping Use    Vaping Use: Never used   Substance and Sexual Activity    Alcohol use: No    Drug use: No    Sexual activity: Defer           Objective   Physical Exam  Vitals reviewed.   Constitutional:       Appearance: She is well-developed. She is obese.   HENT:      Head: Normocephalic and atraumatic.   Eyes:      General: Lids are normal. Vision grossly intact.         Right eye: No foreign body, discharge or hordeolum.      Extraocular Movements: Extraocular movements intact.      Right eye: Normal extraocular motion and no nystagmus.      Conjunctiva/sclera: Conjunctivae normal.      Right eye: Right conjunctiva is not injected. No chemosis, exudate or hemorrhage.     Pupils: Pupils are equal, round, and reactive to light.   Cardiovascular:      Rate and Rhythm: Normal rate and regular rhythm.      Heart sounds: Normal heart sounds.   Pulmonary:      Effort: Pulmonary effort is normal. No respiratory distress.      Breath sounds: Normal breath sounds. No wheezing.   Abdominal:      General: Bowel sounds are normal. There is no distension.      Palpations: Abdomen is soft. There is no mass.      Tenderness: There is no abdominal tenderness. There is no guarding or rebound.   Musculoskeletal:         General: No deformity. Normal range of motion.      Cervical back: Normal range of motion and neck supple.   Skin:     General: Skin is warm and dry.      Capillary Refill: Capillary refill takes less than 2 seconds.   Neurological:      General: No focal deficit present.      Mental Status: She is alert and oriented to person, place, and time.      GCS: GCS eye subscore is 4. GCS verbal subscore is 5. GCS motor subscore is 6.      Cranial Nerves: Cranial nerves 2-12 are intact. No cranial nerve deficit.      Sensory: Sensation is intact. No sensory deficit.      Motor: Motor function is intact.       "Deep Tendon Reflexes: Reflexes normal.      Comments: The patient has the sensation intact on both sides of her face with no differentiation.  Two-point discrimination intact bilaterally on the face.  The patient has no facial droop her cranial nerves are all intact.  There is intermittent twitching noted of the right eye on exam       Procedures           ED Course  ED Course as of 08/04/23 2233   Fri Aug 04, 2023   2116 Marked ready for CAT scan [KW]      ED Course User Index  [KW] Melinda Avalos, APRN    /81   Pulse 93   Temp 97.8 øF (36.6 øC)   Resp 18   Ht 165.1 cm (65\")   Wt (!) 136 kg (300 lb 0.7 oz)   LMP 08/01/2019   SpO2 97%   BMI 49.93 kg/mý   Labs Reviewed   COMPREHENSIVE METABOLIC PANEL - Abnormal; Notable for the following components:       Result Value    Glucose 118 (*)     BUN 22 (*)     BUN/Creatinine Ratio 37.3 (*)     All other components within normal limits    Narrative:     GFR Normal >60  Chronic Kidney Disease <60  Kidney Failure <15     CBC WITH AUTO DIFFERENTIAL - Normal   CBC AND DIFFERENTIAL    Narrative:     The following orders were created for panel order CBC & Differential.  Procedure                               Abnormality         Status                     ---------                               -----------         ------                     CBC Auto Differential[[id number]]        Normal              Final result                 Please view results for these tests on the individual orders.     CT Head Without Contrast   Final Result   Impression:   No acute intracranial abnormality.         Electronically Signed: Damian Heredia     8/4/2023 9:36 PM EDT     Workstation ID: [id number]                                               Medical Decision Making  Patient is a 58-year-old obese female who comes in with right eye twitching and no other neurological symptoms this is concerning for just spasms of the right ciliary muscles eye infection acute stroke or other " abnormalities of the neurological system this is not an all-inclusive list.  The patient had above exam and was found to have no neurological deficits on exam.  Her CT was obtained and interpreted by the radiologist as well as myself to be within normal limits.  The patient will follow-up with our ophthalmologist for further evaluation notes that she has seen an eye doctor but wishes her to have a better exam with Dr. Soto's office.  Was agreeable this plan of care she was discharged home her lab work was interpreted by myself as well as being within normal limits    Verbalized understood discharge instruction    Problems Addressed:  Ciliary muscle spasm of right eye: complicated acute illness or injury    Amount and/or Complexity of Data Reviewed  External Data Reviewed:      Details: No notes to review in relation to today's visit  Labs: ordered. Decision-making details documented in ED Course.  Radiology: ordered and independent interpretation performed. Decision-making details documented in ED Course.  ECG/medicine tests:  Decision-making details documented in ED Course.    Risk  OTC drugs.  Prescription drug management.        Final diagnoses:   Ciliary muscle spasm of right eye       ED Disposition  ED Disposition       ED Disposition   Discharge    Condition   Stable    Comment   --               Leonides Soto MD  [address] [place] [address] [place] [facility]  [phone]      Call for follow-up appointment         Medication List      No changes were made to your prescriptions during this visit.            Melinda Avalos, APRN  08/04/23 7004

## 2023-08-08 ENCOUNTER — LAB (OUTPATIENT)
Dept: LAB | Facility: HOSPITAL | Age: 58
End: 2023-08-08
Payer: COMMERCIAL

## 2023-08-08 ENCOUNTER — TRANSCRIBE ORDERS (OUTPATIENT)
Dept: ADMINISTRATIVE | Facility: HOSPITAL | Age: 58
End: 2023-08-08
Payer: COMMERCIAL

## 2023-08-08 DIAGNOSIS — R51.9 TEMPORAL PAIN: Primary | ICD-10-CM

## 2023-08-08 DIAGNOSIS — R51.9 TEMPORAL PAIN: ICD-10-CM

## 2023-08-08 LAB — CRP SERPL-MCNC: 1.74 MG/DL (ref 0–0.5)

## 2023-08-08 PROCEDURE — 86140 C-REACTIVE PROTEIN: CPT

## 2023-08-08 PROCEDURE — 36415 COLL VENOUS BLD VENIPUNCTURE: CPT

## 2023-08-18 NOTE — PROGRESS NOTES
Subjective   Candice Swan is a 58 y.o. female.     Chief Complaint   Patient presents with    Hernia    ER followup     [facility] 8/4/2023       History of Present Illness  Candice was seen at [facility]  on 8/4/2023. She was seen for facial twitching and numbness. Labs that were performed during the encounter included: CMP-abnormal with elevation in BUN 22 and CBC-WNL. Diagnostic studies that were performed included: CT Scan of the head showed No acute intracranial abnormality.. Medication changes: none.     Candice reports that she went to the optometrist and eye exam was normal. She reports some occasional tenderness in left eye region and headaches.   Abdominal Pain  This is a recurrent problem. The current episode started 1 to 4 weeks ago. The onset quality is gradual. The problem occurs constantly. The problem has been rapidly worsening. The pain is located in the periumbilical region. The pain is at a severity of 5/10. The pain is moderate. The quality of the pain is burning and sharp. The abdominal pain radiates to the LUQ and RUQ. Associated symptoms include headaches. Pertinent negatives include no diarrhea, fever, hematuria, nausea or vomiting. Nothing aggravates the pain. The pain is relieved by Nothing. She has tried antibiotics for the symptoms. The treatment provided no relief.   Hernia  This is a recurrent problem. The current episode started more than 1 month ago. The problem occurs constantly. The problem has been gradually improving. Associated symptoms include abdominal pain and headaches. Pertinent negatives include no chest pain, chills, diaphoresis, fever, nausea or vomiting. Nothing aggravates the symptoms. Treatments tried: topical agents. The treatment provided moderate relief.   URI   This is a new problem. The current episode started in the past 7 days. The problem has been unchanged. There has been no fever. Associated symptoms include abdominal pain, ear pain, headaches and sinus  pain. Pertinent negatives include no chest pain, diarrhea, nausea or vomiting. Associated symptoms comments: Jaw pain . She has tried nothing for the symptoms.          I personally reviewed and updated the patient's allergies, medications, problem list, and past medical, surgical, social, and family history. I have reviewed and confirmed the accuracy of the History of Present Illness and Review of Symptoms as documented by the MA/LPN/RN. Brandin Sunshine MD    Family History   Problem Relation Age of Onset    Diabetes Mother     Heart disease Mother        Social History     Tobacco Use    Smoking status: Never     Passive exposure: Never    Smokeless tobacco: Never   Vaping Use    Vaping Use: Never used   Substance Use Topics    Alcohol use: No    Drug use: No       Past Surgical History:   Procedure Laterality Date    CARDIOVASCULAR STRESS TEST  06/11/2019    abnormal    CHOLECYSTECTOMY      ECHO - CONVERTED  2019       Patient Active Problem List   Diagnosis    Degeneration of intervertebral disc at C5-C6 level    Numbness and tingling in left arm    Umbilical hernia    Osteoarthritis of both knees    Class 3 severe obesity due to excess calories with serious comorbidity and body mass index (BMI) of 45.0 to 49.9 in adult    Abnormal nuclear stress test    Fatigue    Allergic rhinitis    Fatty liver    Mixed hyperlipidemia    Empty sella    Cervical disc disease    Lipoma    GERD (gastroesophageal reflux disease)    Spasm eyelid    SOB (shortness of breath)    Dysuria    Annual visit for general adult medical examination with abnormal findings    Screening for malignant neoplasm of breast    Screening for malignant neoplasm of cervix    Acute bacterial sinusitis    Seasonal allergic rhinitis due to pollen    Acute pain of left knee    Neuropathy    Screen for colon cancer    Acute left-sided thoracic back pain    TIA (transient ischemic attack)         Current Outpatient Medications:     albuterol sulfate   (90 Base) MCG/ACT inhaler, Inhale 2 puffs every 4-6 hours prn, Disp: 18 g, Rfl: 5    amitriptyline (ELAVIL) 25 MG tablet, Take 1 tablet by mouth Every Night., Disp: 30 tablet, Rfl: 5    ASPIRIN 81 PO, Take  by mouth., Disp: , Rfl:     cephalexin (KEFLEX) 500 MG capsule, Take 1 capsule by mouth 3 (Three) Times a Day., Disp: 30 capsule, Rfl: 0    cyclobenzaprine (FLEXERIL) 10 MG tablet, Take 1 tablet by mouth Daily As Needed for Muscle Spasms. 1 tablet to half tablet as needed nightly, Disp: 30 tablet, Rfl: 2    erythromycin (ROMYCIN) 5 MG/GM ophthalmic ointment, Apply  to eye(s) as directed by provider 6 (Six) Times a Day., Disp: 1 each, Rfl: 0    furosemide (LASIX) 20 MG tablet, Take 1 tablet by mouth Daily As Needed (swelling)., Disp: 30 tablet, Rfl: 5    hydrocortisone (ANUSOL-HC) 25 MG suppository, Insert 1 suppository into the rectum 2 (Two) Times a Day., Disp: 24 each, Rfl: 5    meloxicam (MOBIC) 15 MG tablet, Take 1 tablet by mouth Daily As Needed for Moderate Pain., Disp: 90 tablet, Rfl: 1    potassium chloride (K-DUR,KLOR-CON) 20 MEQ CR tablet, Take 1 tablet by mouth Daily., Disp: 30 tablet, Rfl: 5    silver sulfadiazine (SILVADENE, SSD) 1 % cream, Apply 1 application topically to the appropriate area as directed 2 (Two) Times a Day., Disp: 400 g, Rfl: 0    sulfacetamide (BLEPH-10) 10 % ophthalmic solution, Apply 1 drop to eye(s) as directed by provider Every 3 (Three) Hours., Disp: 15 mL, Rfl: 0    sulfamethoxazole-trimethoprim (BACTRIM DS,SEPTRA DS) 800-160 MG per tablet, Take 1 tablet by mouth 2 (Two) Times a Day. (Patient not taking: Reported on 8/22/2023), Disp: 30 tablet, Rfl: 0         Review of Systems   Constitutional:  Negative for chills, diaphoresis and fever.   HENT:  Positive for ear pain.    Eyes:  Negative for visual disturbance.   Respiratory:  Negative for shortness of breath.    Cardiovascular:  Negative for chest pain and palpitations.   Gastrointestinal:  Positive for abdominal pain.  "Negative for diarrhea, nausea and vomiting.   Endocrine: Negative for polydipsia and polyphagia.   Genitourinary:  Negative for hematuria.   Musculoskeletal:  Negative for neck stiffness.   Skin:  Negative for color change and pallor.   Neurological:  Negative for seizures and syncope.   Hematological:  Negative for adenopathy.   Psychiatric/Behavioral:  Negative for hallucinations and suicidal ideas.      I have reviewed and confirmed the accuracy of the ROS as documented by the MA/LPN/RN Brandin Sunshine MD      Objective   /86 (BP Location: Right arm, Patient Position: Sitting, Cuff Size: Large Adult)   Pulse 96   Temp 97.7 øF (36.5 øC)   Resp 18   Ht 165.1 cm (65\")   Wt (!) 136 kg (300 lb 12.8 oz)   LMP 08/01/2019   SpO2 100%   BMI 50.06 kg/mý   BP Readings from Last 3 Encounters:   08/22/23 130/86   08/04/23 140/78   06/28/23 134/82     Wt Readings from Last 3 Encounters:   08/22/23 (!) 136 kg (300 lb 12.8 oz)   08/04/23 (!) 136 kg (300 lb 0.7 oz)   07/17/23 136 kg (298 lb 12.8 oz)     Physical Exam  Constitutional:       Appearance: She is well-developed. She is not diaphoretic.   HENT:      Head: Normocephalic.      Right Ear: Tympanic membrane, ear canal and external ear normal.      Left Ear: Tympanic membrane, ear canal and external ear normal.      Nose: Nose normal.   Eyes:      General: Lids are normal.      Conjunctiva/sclera: Conjunctivae normal.      Pupils: Pupils are equal, round, and reactive to light.   Neck:      Thyroid: No thyromegaly.      Vascular: No carotid bruit or JVD.      Trachea: No tracheal deviation.   Cardiovascular:      Rate and Rhythm: Normal rate and regular rhythm.      Heart sounds: Normal heart sounds. No murmur heard.    No friction rub. No gallop.   Pulmonary:      Effort: Pulmonary effort is normal.      Breath sounds: Normal breath sounds. No stridor. No decreased breath sounds, wheezing or rales.   Abdominal:      General: Bowel sounds are normal. There is " no distension.      Palpations: Abdomen is soft. There is no mass.      Tenderness: There is no abdominal tenderness. There is no guarding or rebound.      Hernia: No hernia is present.   Lymphadenopathy:      Head:      Right side of head: No submental, submandibular, tonsillar, preauricular, posterior auricular or occipital adenopathy.      Left side of head: No submental, submandibular, tonsillar, preauricular, posterior auricular or occipital adenopathy.      Cervical: No cervical adenopathy.   Skin:     General: Skin is warm and dry.      Coloration: Skin is not pale.   Neurological:      Mental Status: She is alert and oriented to person, place, and time.      Cranial Nerves: No cranial nerve deficit.      Sensory: No sensory deficit.      Coordination: Coordination normal.      Gait: Gait normal.      Deep Tendon Reflexes: Reflexes are normal and symmetric.       Data / Lab Results:    Hemoglobin A1C   Date Value Ref Range Status   10/05/2022 6.1 % Final        Lab Results   Component Value Date    LDL 73 09/06/2022    LDL 62 05/02/2019    LDL 63 11/01/2016     Lab Results   Component Value Date    CHOL 129 05/02/2019    CHOL 136 11/01/2016     Lab Results   Component Value Date    TRIG 79 09/06/2022    TRIG 98 05/02/2019    TRIG 126 11/01/2016     Lab Results   Component Value Date    HDL 51 09/06/2022    HDL 49 (L) 05/02/2019    HDL 48 11/01/2016     No results found for: PSA  Lab Results   Component Value Date    WBC 8.80 08/04/2023    HGB 13.8 08/04/2023    HCT 42.0 08/04/2023    MCV 91.0 08/04/2023     08/04/2023     Lab Results   Component Value Date    TSH 2.850 09/06/2022      Lab Results   Component Value Date    GLUCOSE 118 (H) 08/04/2023    BUN 22 (H) 08/04/2023    CREATININE 0.59 08/04/2023    EGFRIFNONA 108 05/02/2019    EGFRIFAFRI 126 05/02/2019    BCR 37.3 (H) 08/04/2023    K 3.9 08/04/2023    CO2 26.0 08/04/2023    CALCIUM 9.3 08/04/2023    PROTENTOTREF 6.7 07/17/2023    ALBUMIN 3.6  08/04/2023    LABIL2 1.4 07/17/2023    AST 16 08/04/2023    ALT 25 08/04/2023     No results found for: FARHANA, RF, SEDRATE   Lab Results   Component Value Date    CRP 1.74 (H) 08/08/2023      No results found for: IRON, TIBC, FERRITIN   No results found for: [id number]     Procedure note: EKG today with normal sinus rhythm, flipped T's in 3, no significant ST or T wave changes, no injury pattern.    Assessment & Plan      Medications        Problem List         LOS  Health maintenance.  Doing well, vaccines current.  Discussed calcium vitamin D.  D iscussed coated baby aspirin daily.  Discussed health maintenance, screening test last modification.  Mammogram scheduled/Pap sent.  Elevated blood sugar.  Borderline , A1c to 6.1.  Discussed diet, exercise, lifestyle modification, weight loss.  Follow-up recheck fasting blood sugar 6 months.  Elevated LFTs.  Mild, new onset.  DDx includes fatty liver add fish oil.  Follow-up recheck.  Consider further eval persistent elevation.  Left leg pain evaluated with numbness/tingling.    Improved today, x-ray with mild signs of lumbar degeneration, continue as needed nighttime muscle relaxant, rehabilitation exercise discussed, nightly amitriptyline.  Follow-up recheck.  Add as needed meloxicam.  Lower extremity edema.    Improved today, taking furosemide intermittently/as needed.  Benign exam today, no sign of DVT.  DDx includes venous insufficiency, sleep apnea.  Echo benign 2019.  Start Lasix/potassium/JOAQUIN hose.  Discussed low-sodium diet.  Recommend sleep eval she declines currently but states she will consider.  Follow-up recheck.  Consider further eval if persistent symptoms.  Allergic rhinitis.  Over-the-counter Claritin or Zyrtec.  Elevated fasting blood sugar.  Has been working on diet and exercise.  Follow-up recheck.  Umbilical hernia.  Stable today.  Followed by Dr. Diop, working on weight loss, monitoring. S/s incarceration discussed.  Check CT scan.  Obesity.   Improved, working on diet and exercise, has lost weight.  Recommend bariatric surgery, sleep study she declines.  Add ozempic.   Abnormal stress test.  Stress Cardiolite with abnormal area, subsequent echo normal.  Followed by cardiology.  Exercise has been recommended.  Thoracic back pain.  Strain.  Benign exam today.  Start as needed meloxicam.  Rehabilitation exercises discussed.  Add PRN muscle relaxer.  Follow-up recheck.  Consider imaging if persistent symptoms.  Osteoarthritis knees.  Improved today s/p cartilage ijxn, followed by ortho.  Improved on meloxicam as needed, improved status post recent injection.  Still with pain limiting exercise check x-rays, start PT.  Follow-up recheck.  Consider orthopedics referral, repeat injection if persistent symptoms..  symptoms worse on right.  Ice, rehabilitation exercise discussed.  Call return if worsening symptoms.  Colon cancer screening.  Recommend colonoscopy/Cologuard, Cologuard scheduled.  Shortness of breath.  Multifactorial, likely secondary to deconditioning.  Has had cardiology eval.  DDx includes COPD add inhaler.  Increase exercise.  Consider PFTs.  Follow-up recheck.  Cellulitis abdomen.  Improved today has completed course antibiotics.  Overlying hernia, complicating abrasion. Improving today, continue antibiotics/ change to bactrim.  topical care discussed.   Call / return if worsening symptoms.   Viral conjunctivitis.  Benign exam today, start antibiotics.   Call or return if worsening or persistent symptoms.  Blood in stool.  Overall benign exam today, likely secondary to hemorrhoids.  Increase fluids/fiber.  Topical care discussed.  Check hemoglobin.  Cologuard rescheduled.  Call return if worsening symptoms.  Facial numbness/transient weakness.  Clinically improved today, status post eval per ED with benign CT brain, has had benign Optho eval per her report, will get records.  DDx includes TIA, neuropathy, She stopped coated baby aspirin daily,  restart, check MRI brain, EKG benign today, check Holter, carotid Dopplers, echocardiogram.  Close follow-up scheduled.  Call return if recurrent symptoms.  Acute bacterial sinusitis.  Start antibiotics.        Diagnoses and all orders for this visit:    1. Umbilical hernia with obstruction, without gangrene (Primary)    2. Encounter for examination following treatment at hospital    3. Class 3 severe obesity due to excess calories with serious comorbidity and body mass index (BMI) of 45.0 to 49.9 in adult  Assessment & Plan:  Patient's (Body mass index is 50.06 kg/mý.) indicates that they are morbidly/severely obese (BMI > 40 or > 35 with obesity - related health condition) with health conditions that include hypertension and dyslipidemias . Weight is unchanged. BMI  is above average; BMI management plan is completed. We discussed portion control and increasing exercise.       4. Upper respiratory tract infection, unspecified type    5. Chronic migraine with aura    6. Carotid bruit, unspecified laterality    7. Facial paralysis    8. Chest pain, unspecified type    9. Near syncope    10. Left ankle swelling  -     cephalexin (KEFLEX) 500 MG capsule; Take 1 capsule by mouth 3 (Three) Times a Day.  Dispense: 30 capsule; Refill: 0    11. TIA (transient ischemic attack)    12. Acute bacterial sinusitis              Expected course, medications, and adverse effects discussed.  Call or return if worsening or persistent symptoms.  I wore protective equipment throughout this patient encounter including a mask, gloves, and eye protection.  Hand hygiene was performed before donning protective equipment and after removal when leaving the room. The complete contents of the Assessment and Plan and Data/Lab Results as documented above have been reviewed and addressed by myself with the patient today as part of an ongoing evaluation / treatment plan.  If some of the documentation has been copied from a previous note and is  unchanged it indicates that this problem / plan has been assessed today but is stable from a previous visit and no changes have been recommended.

## 2023-08-22 ENCOUNTER — OFFICE VISIT (OUTPATIENT)
Dept: FAMILY MEDICINE CLINIC | Facility: CLINIC | Age: 58
End: 2023-08-22
Payer: COMMERCIAL

## 2023-08-22 VITALS
OXYGEN SATURATION: 100 % | BODY MASS INDEX: 48.82 KG/M2 | HEIGHT: 65 IN | HEART RATE: 96 BPM | SYSTOLIC BLOOD PRESSURE: 130 MMHG | DIASTOLIC BLOOD PRESSURE: 86 MMHG | WEIGHT: 293 LBS | RESPIRATION RATE: 18 BRPM | TEMPERATURE: 97.7 F

## 2023-08-22 DIAGNOSIS — G51.0 FACIAL PARALYSIS: ICD-10-CM

## 2023-08-22 DIAGNOSIS — R55 NEAR SYNCOPE: ICD-10-CM

## 2023-08-22 DIAGNOSIS — R07.9 CHEST PAIN, UNSPECIFIED TYPE: ICD-10-CM

## 2023-08-22 DIAGNOSIS — R09.89 CAROTID BRUIT, UNSPECIFIED LATERALITY: ICD-10-CM

## 2023-08-22 DIAGNOSIS — Z09 ENCOUNTER FOR EXAMINATION FOLLOWING TREATMENT AT HOSPITAL: ICD-10-CM

## 2023-08-22 DIAGNOSIS — B96.89 ACUTE BACTERIAL SINUSITIS: ICD-10-CM

## 2023-08-22 DIAGNOSIS — G45.9 TIA (TRANSIENT ISCHEMIC ATTACK): ICD-10-CM

## 2023-08-22 DIAGNOSIS — M25.472 LEFT ANKLE SWELLING: ICD-10-CM

## 2023-08-22 DIAGNOSIS — K42.0 UMBILICAL HERNIA WITH OBSTRUCTION, WITHOUT GANGRENE: Primary | ICD-10-CM

## 2023-08-22 DIAGNOSIS — G43.109 CHRONIC MIGRAINE WITH AURA: ICD-10-CM

## 2023-08-22 DIAGNOSIS — J06.9 UPPER RESPIRATORY TRACT INFECTION, UNSPECIFIED TYPE: ICD-10-CM

## 2023-08-22 DIAGNOSIS — J01.90 ACUTE BACTERIAL SINUSITIS: ICD-10-CM

## 2023-08-22 DIAGNOSIS — E66.01 CLASS 3 SEVERE OBESITY DUE TO EXCESS CALORIES WITH SERIOUS COMORBIDITY AND BODY MASS INDEX (BMI) OF 45.0 TO 49.9 IN ADULT: ICD-10-CM

## 2023-08-22 RX ORDER — CEPHALEXIN 500 MG/1
500 CAPSULE ORAL 3 TIMES DAILY
Qty: 30 CAPSULE | Refills: 0 | Status: SHIPPED | OUTPATIENT
Start: 2023-08-22

## 2023-08-22 NOTE — ASSESSMENT & PLAN NOTE
Patient's (Body mass index is 50.06 kg/mý.) indicates that they are morbidly/severely obese (BMI > 40 or > 35 with obesity - related health condition) with health conditions that include hypertension and dyslipidemias . Weight is unchanged. BMI  is above average; BMI management plan is completed. We discussed portion control and increasing exercise.

## 2023-08-23 ENCOUNTER — CLINICAL SUPPORT (OUTPATIENT)
Dept: FAMILY MEDICINE CLINIC | Facility: CLINIC | Age: 58
End: 2023-08-23
Payer: COMMERCIAL

## 2023-08-23 ENCOUNTER — TELEPHONE (OUTPATIENT)
Dept: FAMILY MEDICINE CLINIC | Facility: CLINIC | Age: 58
End: 2023-08-23

## 2023-08-23 DIAGNOSIS — R55 NEAR SYNCOPE: Primary | ICD-10-CM

## 2023-08-23 DIAGNOSIS — R07.9 CHEST PAIN, UNSPECIFIED TYPE: ICD-10-CM

## 2023-08-23 NOTE — TELEPHONE ENCOUNTER
Let her know her Holter overall looks good, she does have some extra beats and some short runs of fast beats but nothing significant that would cause her any problems, thanks

## 2023-08-29 ENCOUNTER — TELEPHONE (OUTPATIENT)
Dept: FAMILY MEDICINE CLINIC | Facility: CLINIC | Age: 58
End: 2023-08-29

## 2023-08-29 NOTE — TELEPHONE ENCOUNTER
Spoke with Luana. She was requesting Holter monitor results. Discussed these with her. Documented in holter encounter

## 2023-08-29 NOTE — TELEPHONE ENCOUNTER
I spoke with Luana, She expressed understanding of results and states that she will get her MRI and Carotid doppler scheduled. These are currently awaiting scheduling. Let her know to call me if there is any issues with getting these scheduled

## 2023-08-29 NOTE — TELEPHONE ENCOUNTER
Caller: CISCO SIGALA    Relationship: Emergency Contact    Best call back number: [phone]    What test was performed: ULTRASOUND OF STOMACH     When was the test performed: 2 WEEKS AGO     Where was the test performed: IN OUR OFFICE BUILDING     Additional notes:    PLEASE ADVISE

## 2023-10-17 NOTE — PROGRESS NOTES
Subjective   Candice Swan is a 58 y.o. female.     Chief Complaint   Patient presents with    Annual Exam    Hyperlipidemia       The patient is here: to discuss health maintenance and disease prevention.  Last comprehensive physical was on 10/5/2022.  Previous physical was performed by  Brandin Sunshine MD  Overall has: minimal activity with work/home activities, good appetite, feels well with minor complaints, good energy level, is sleeping well, and returned to full activity. Nutrition: appropriate balanced diet and balanced diet. Last tetanus shot was  11/1/2016 . Patient is doing routine self breast exams: occasionally Patient's last stress test was:  6/11/2019      Last Completed Pap Smear            PAP SMEAR (Every 3 Years) Next due on 10/5/2025      10/05/2022  IGP,CtNg,AptimaHPV,rfx16 / 18,45    01/13/2017  Liquid-based Pap Smear, Screening    01/13/2017  HPV DNA Probe, Direct - ,                   Last Completed Mammogram            Ordered - MAMMOGRAM (Every 2 Years) Ordered on 10/18/2023      10/14/2022  Mammo Screening Digital Tomosynthesis Bilateral With CAD    05/15/2019  Mammo Screening Bilateral With CAD    05/15/2019  SCANNED - MAMMO                    History of Present Illness  Influenza immunization was not given due to patient refusal    Hyperlipidemia  This is a chronic problem. The current episode started more than 1 year ago. Recent lipid tests were reviewed and are normal. Exacerbating diseases include obesity. Associated symptoms include leg pain. Pertinent negatives include no chest pain or shortness of breath. Current antihyperlipidemic treatment includes diet change and exercise. There are no compliance problems.  Risk factors for coronary artery disease include obesity.   Knee Pain   The incident occurred more than 1 week ago. There was no injury mechanism. The pain is present in the left knee and right knee. The quality of the pain is described as stabbing. The pain is at a  severity of 5/10. The pain is moderate. The pain has been Constant since onset. She reports no foreign bodies present. The symptoms are aggravated by movement and weight bearing. Treatments tried: last injection was 3/2023.        Recent Hospitalizations:  No hospitalization(s) within the last year..  ccc      I personally reviewed and updated the patient's allergies, medications, problem list, and past medical, surgical, social, and family history. I have reviewed and confirmed the accuracy of the HPI and ROS as documented by the MA/LPN/RN Brandin Sunsihne MD    Family History   Problem Relation Age of Onset    Diabetes Mother     Heart disease Mother        Social History     Tobacco Use    Smoking status: Never     Passive exposure: Never    Smokeless tobacco: Never   Vaping Use    Vaping Use: Never used   Substance Use Topics    Alcohol use: No    Drug use: No       Past Surgical History:   Procedure Laterality Date    CARDIOVASCULAR STRESS TEST  06/11/2019    abnormal    CHOLECYSTECTOMY      ECHO - CONVERTED  2019       Patient Active Problem List   Diagnosis    Degeneration of intervertebral disc at C5-C6 level    Numbness and tingling in left arm    Umbilical hernia    Osteoarthritis of both knees    Class 3 severe obesity due to excess calories with serious comorbidity and body mass index (BMI) of 50.0 to 59.9 in adult    Abnormal nuclear stress test    Fatigue    Allergic rhinitis    Fatty liver    Mixed hyperlipidemia    Empty sella    Cervical disc disease    Lipoma    GERD (gastroesophageal reflux disease)    Spasm eyelid    SOB (shortness of breath)    Dysuria    Annual visit for general adult medical examination with abnormal findings    Screening for malignant neoplasm of breast    Screening for malignant neoplasm of cervix    Acute bacterial sinusitis    Seasonal allergic rhinitis due to pollen    Acute pain of left knee    Neuropathy    Screen for colon cancer    Acute left-sided thoracic back pain     TIA (transient ischemic attack)         Current Outpatient Medications:     albuterol sulfate  (90 Base) MCG/ACT inhaler, Inhale 2 puffs every 4-6 hours prn, Disp: 18 g, Rfl: 5    amitriptyline (ELAVIL) 25 MG tablet, Take 1 tablet by mouth Every Night., Disp: 30 tablet, Rfl: 5    ASPIRIN 81 PO, Take  by mouth., Disp: , Rfl:     cyclobenzaprine (FLEXERIL) 10 MG tablet, Take 1 tablet by mouth Daily As Needed for Muscle Spasms. 1 tablet to half tablet as needed nightly, Disp: 30 tablet, Rfl: 2    erythromycin (ROMYCIN) 5 MG/GM ophthalmic ointment, Apply  to eye(s) as directed by provider 6 (Six) Times a Day., Disp: 1 each, Rfl: 0    furosemide (LASIX) 20 MG tablet, Take 1 tablet by mouth Daily As Needed (swelling)., Disp: 30 tablet, Rfl: 5    hydrocortisone (ANUSOL-HC) 25 MG suppository, Insert 1 suppository into the rectum 2 (Two) Times a Day., Disp: 24 each, Rfl: 5    meloxicam (MOBIC) 15 MG tablet, Take 1 tablet by mouth Daily As Needed for Moderate Pain., Disp: 90 tablet, Rfl: 1    potassium chloride (K-DUR,KLOR-CON) 20 MEQ CR tablet, Take 1 tablet by mouth Daily., Disp: 30 tablet, Rfl: 5    silver sulfadiazine (SILVADENE, SSD) 1 % cream, Apply 1 application topically to the appropriate area as directed 2 (Two) Times a Day., Disp: 400 g, Rfl: 0    sulfacetamide (BLEPH-10) 10 % ophthalmic solution, Apply 1 drop to eye(s) as directed by provider Every 3 (Three) Hours., Disp: 15 mL, Rfl: 0    Review of Systems   Constitutional:  Negative for chills and diaphoresis.   HENT:  Negative for trouble swallowing and voice change.    Eyes:  Negative for visual disturbance.   Respiratory:  Negative for shortness of breath.    Cardiovascular:  Negative for chest pain and palpitations.   Gastrointestinal:  Negative for abdominal pain and nausea.   Endocrine: Negative for polydipsia and polyphagia.   Genitourinary:  Negative for hematuria.   Musculoskeletal:  Negative for neck stiffness.   Skin:  Negative for color change  "and pallor.   Allergic/Immunologic: Negative for immunocompromised state.   Neurological:  Negative for seizures and syncope.   Hematological:  Negative for adenopathy.   Psychiatric/Behavioral:  Negative for hallucinations, sleep disturbance and suicidal ideas.        I have reviewed and confirmed the accuracy of the ROS as documented by the MA/LPN/RN Brandin Sunshine MD      Objective   /74 (BP Location: Right arm, Patient Position: Sitting, Cuff Size: Large Adult)   Pulse 110   Temp 98.4 °F (36.9 °C) (Temporal)   Resp 18   Ht 165.1 cm (65\")   Wt (!) 139 kg (306 lb 6.4 oz)   LMP 08/01/2019   SpO2 97%   BMI 50.99 kg/m²   BP Readings from Last 3 Encounters:   10/18/23 124/74   08/22/23 130/86   08/04/23 140/78     Wt Readings from Last 3 Encounters:   10/18/23 (!) 139 kg (306 lb 6.4 oz)   08/22/23 (!) 136 kg (300 lb 12.8 oz)   08/04/23 (!) 136 kg (300 lb 0.7 oz)     Physical Exam  Constitutional:       Appearance: She is well-developed. She is not diaphoretic.   HENT:      Head: Normocephalic.      Right Ear: Tympanic membrane, ear canal and external ear normal.      Left Ear: Tympanic membrane, ear canal and external ear normal.      Nose: Nose normal.   Eyes:      General: Lids are normal.      Conjunctiva/sclera: Conjunctivae normal.      Pupils: Pupils are equal, round, and reactive to light.   Neck:      Thyroid: No thyromegaly.      Vascular: No carotid bruit or JVD.      Trachea: No tracheal deviation.   Cardiovascular:      Rate and Rhythm: Normal rate and regular rhythm.      Heart sounds: Normal heart sounds. No murmur heard.     No friction rub. No gallop.   Pulmonary:      Effort: Pulmonary effort is normal.      Breath sounds: Normal breath sounds. No stridor. No decreased breath sounds, wheezing or rales.   Abdominal:      General: Bowel sounds are normal. There is no distension.      Palpations: Abdomen is soft. There is no mass.      Tenderness: There is no abdominal tenderness. There is " "no guarding or rebound.      Hernia: No hernia is present.   Lymphadenopathy:      Head:      Right side of head: No submental, submandibular, tonsillar, preauricular, posterior auricular or occipital adenopathy.      Left side of head: No submental, submandibular, tonsillar, preauricular, posterior auricular or occipital adenopathy.      Cervical: No cervical adenopathy.   Skin:     General: Skin is warm and dry.      Coloration: Skin is not pale.   Neurological:      Mental Status: She is alert and oriented to person, place, and time.      Cranial Nerves: No cranial nerve deficit.      Sensory: No sensory deficit.      Coordination: Coordination normal.      Gait: Gait normal.      Deep Tendon Reflexes: Reflexes are normal and symmetric.         Data / Lab Results:    Hemoglobin A1C   Date Value Ref Range Status   10/18/2023 6.4 (A) 4.5 - 5.7 % Final   10/05/2022 6.1 % Final        Lab Results   Component Value Date    LDL 84 10/02/2023    LDL 73 09/06/2022    LDL 62 05/02/2019     Lab Results   Component Value Date    CHOL 129 05/02/2019    CHOL 136 11/01/2016     Lab Results   Component Value Date    TRIG 97 10/02/2023    TRIG 79 09/06/2022    TRIG 98 05/02/2019     Lab Results   Component Value Date    HDL 51 10/02/2023    HDL 51 09/06/2022    HDL 49 (L) 05/02/2019     No results found for: \"PSA\"  Lab Results   Component Value Date    WBC 7.4 10/02/2023    HGB 14.0 10/02/2023    HCT 42.4 10/02/2023    MCV 90 10/02/2023     10/02/2023     Lab Results   Component Value Date    TSH 3.360 10/02/2023     Lab Results   Component Value Date    GLUCOSE 129 (H) 10/02/2023    BUN 12 10/02/2023    CREATININE 0.61 10/02/2023    EGFRIFNONA 108 05/02/2019    EGFRIFAFRI 126 05/02/2019    BCR 20 10/02/2023    K 4.4 10/02/2023    CO2 24 10/02/2023    CALCIUM 9.4 10/02/2023    PROTENTOTREF 6.9 10/02/2023    ALBUMIN 4.0 10/02/2023    LABIL2 1.4 10/02/2023    AST 17 10/02/2023    ALT 24 10/02/2023     No results found for: " "\"FARHANA\", \"RF\", \"SEDRATE\"   Lab Results   Component Value Date    CRP 1.74 (H) 08/08/2023      No results found for: \"IRON\", \"TIBC\", \"FERRITIN\"   No results found for: \"CCLWHKVW38\"     Age-appropriate Screening Schedule:  Refer to the list below for future screening recommendations based on patient's age, sex and/or medical conditions. Orders for these recommended tests are listed in the plan section. The patient has been provided with a written plan.    Health Maintenance   Topic Date Due    COLORECTAL CANCER SCREENING  Never done    COVID-19 Vaccine (1) Never done    Pneumococcal Vaccine 0-64 (1 - PCV) Never done    ZOSTER VACCINE (1 of 2) Never done    HEPATITIS C SCREENING  Never done    INFLUENZA VACCINE  08/01/2023    LIPID PANEL  10/02/2024    MAMMOGRAM  10/14/2024    ANNUAL PHYSICAL  10/18/2024    BMI FOLLOWUP  10/18/2024    PAP SMEAR  10/05/2025    TDAP/TD VACCINES (2 - Td or Tdap) 11/01/2026           Assessment & Plan      Medications        Problem List         LOS    Physical.  Doing well, vaccines current.  pneumonia vaccine sched.  Discussed calcium vitamin D.  D iscussed coated baby aspirin daily.  Discussed health maintenance, screening test last modification.  Mammogram benign 2020, repeat scheduled.  Pap/H PV neg 2022.  Elevated blood sugar.  Borderline , A1c to 6.4.  Discussed diet, exercise, lifestyle modification, weight loss.  Follow-up recheck fasting blood sugar 6 months.  Elevated LFTs.  Improved/resolved on recheck.  Mild, new onset.  DDx includes fatty liver add fish oil.  Follow-up recheck.  Consider further eval persistent elevation.  Left leg pain evaluated with numbness/tingling.    Improved today, x-ray with mild signs of lumbar degeneration, continue as needed nighttime muscle relaxant, rehabilitation exercise discussed, nightly amitriptyline.  Follow-up recheck.  Add as needed meloxicam.  Lower extremity edema.    Improved today, taking furosemide intermittently/as needed.  " Benign exam today, no sign of DVT.  DDx includes venous insufficiency, sleep apnea.  Echo benign 2019.  Start Lasix/potassium/JOAQUIN hose.  Discussed low-sodium diet.  Recommend sleep eval she declines currently but states she will consider.  Follow-up recheck.  Consider further eval if persistent symptoms.  Allergic rhinitis.  Over-the-counter Claritin or Zyrtec.  Elevated fasting blood sugar.  Has been working on diet and exercise.  Follow-up recheck.  Umbilical hernia.  Stable today.  Followed by Dr. Diop, working on weight loss, monitoring. S/s incarceration discussed.  2.6 cm per CT scan 6/23.  Obesity.  Improved, working on diet and exercise, has lost weight.  Recommend bariatric surgery, sleep study she declines.  Add ozempic.   Abnormal stress test.  Stress Cardiolite with abnormal area, subsequent echo normal.  Followed by cardiology.  Exercise has been recommended.  Thoracic back pain.  Strain.  Benign exam today.  Start as needed meloxicam.  Rehabilitation exercises discussed.  Add PRN muscle relaxer.  Follow-up recheck.  Consider imaging if persistent symptoms.  Osteoarthritis knees.  Improved today s/p cartilage ijxn, followed by ortho / follow up schef.  Improved on meloxicam as needed, improved status post recent injection.  Still with pain limiting exercise check x-rays, start PT.  Follow-up recheck.  Consider orthopedics referral, repeat injection if persistent symptoms..  symptoms worse on right.  Ice, rehabilitation exercise discussed.  Call return if worsening symptoms.  Colon cancer screening.  Recommend colonoscopy/Cologuard, Cologuard scheduled.  Shortness of breath.  Multifactorial, likely secondary to deconditioning.  Has had cardiology eval.  DDx includes COPD add inhaler.  Increase exercise.  Consider PFTs.  Follow-up recheck.  Cellulitis abdomen.  Improved today has completed course antibiotics.  Overlying hernia, complicating abrasion. Improving today, continue antibiotics/ change to  bactrim.  topical care discussed.   Call / return if worsening symptoms.   Viral conjunctivitis.  Benign exam today, start antibiotics.   Call or return if worsening or persistent symptoms.  Blood in stool.  Overall benign exam today, likely secondary to hemorrhoids.  Increase fluids/fiber.  Topical care discussed.  Check hemoglobin.  Cologuard rescheduled.  Call return if worsening symptoms.  Facial numbness/transient weakness.  Clinically improved today, status post eval per ED with benign CT brain, has had benign Optho eval per her report, will get records.  DDx includes TIA, neuropathy, She stopped coated baby aspirin daily, restart, MRI brain benign, EKG benign today, Holter with short vruns onky max 7 beats, carotid Dopplers, echocardiogram with bubble study scheduled.  Close follow-up scheduled.  Call return if recurrent symptoms.        Diagnoses and all orders for this visit:    1. Annual visit for general adult medical examination with abnormal findings (Primary)  -     POCT urinalysis dipstick, manual    2. Mixed hyperlipidemia    3. Class 3 severe obesity due to excess calories with serious comorbidity and body mass index (BMI) of 50.0 to 59.9 in adult  Assessment & Plan:  Patient's (Body mass index is 50.99 kg/m².) indicates that they are morbidly/severely obese (BMI > 40 or > 35 with obesity - related health condition) with health conditions that include dyslipidemias . Weight is worsening. BMI  is above average; BMI management plan is completed. We discussed portion control and increasing exercise.       4. Encounter for screening for malignant neoplasm of breast, unspecified screening modality  -     Cancel: Mammo Screening Digital Tomosynthesis Bilateral With CAD; Future  -     Mammo Screening Digital Tomosynthesis Bilateral With CAD; Future    5. Screening for malignant neoplasm of cervix    6. Screen for colon cancer  -     Cancel: Cologuard - Stool, Per Rectum; Future  -     Cologuard - Stool,  Per Rectum; Future    7. Chronic pain of both knees    8. Elevated fasting blood sugar  -     POC Glycosylated Hemoglobin (Hb A1C)              Expected course, medications, and adverse effects discussed.  Call or return if worsening or persistent symptoms.  I wore protective equipment throughout this patient encounter including a mask, gloves, and eye protection.  Hand hygiene was performed before donning protective equipment and after removal when leaving the room. The complete contents of the Assessment and Plan and Data / Lab Results as documented above have been reviewed and addressed by myself with the patient today as part of an ongoing evaluation / treatment plan.  If some of the documentation has been copied from a previous note and is unchanged it indicates that this problem / plan has been assessed today but is stable from a previous visit and no changes have been recommended.  The separate E/M service provided today is significant, medically necessary, and separately identifiable.

## 2023-10-18 ENCOUNTER — OFFICE VISIT (OUTPATIENT)
Dept: FAMILY MEDICINE CLINIC | Facility: CLINIC | Age: 58
End: 2023-10-18
Payer: COMMERCIAL

## 2023-10-18 VITALS
TEMPERATURE: 98.4 F | DIASTOLIC BLOOD PRESSURE: 74 MMHG | SYSTOLIC BLOOD PRESSURE: 124 MMHG | WEIGHT: 293 LBS | BODY MASS INDEX: 48.82 KG/M2 | HEART RATE: 110 BPM | OXYGEN SATURATION: 97 % | RESPIRATION RATE: 18 BRPM | HEIGHT: 65 IN

## 2023-10-18 DIAGNOSIS — R09.89 CAROTID BRUIT, UNSPECIFIED LATERALITY: ICD-10-CM

## 2023-10-18 DIAGNOSIS — M25.562 CHRONIC PAIN OF BOTH KNEES: ICD-10-CM

## 2023-10-18 DIAGNOSIS — R73.01 ELEVATED FASTING BLOOD SUGAR: ICD-10-CM

## 2023-10-18 DIAGNOSIS — Z00.01 ANNUAL VISIT FOR GENERAL ADULT MEDICAL EXAMINATION WITH ABNORMAL FINDINGS: Primary | ICD-10-CM

## 2023-10-18 DIAGNOSIS — G89.29 CHRONIC PAIN OF BOTH KNEES: ICD-10-CM

## 2023-10-18 DIAGNOSIS — K76.0 FATTY LIVER: ICD-10-CM

## 2023-10-18 DIAGNOSIS — E78.2 MIXED HYPERLIPIDEMIA: ICD-10-CM

## 2023-10-18 DIAGNOSIS — Z12.39 ENCOUNTER FOR SCREENING FOR MALIGNANT NEOPLASM OF BREAST, UNSPECIFIED SCREENING MODALITY: ICD-10-CM

## 2023-10-18 DIAGNOSIS — R07.9 CHEST PAIN, UNSPECIFIED TYPE: ICD-10-CM

## 2023-10-18 DIAGNOSIS — M25.561 CHRONIC PAIN OF BOTH KNEES: ICD-10-CM

## 2023-10-18 DIAGNOSIS — G45.9 TIA (TRANSIENT ISCHEMIC ATTACK): ICD-10-CM

## 2023-10-18 DIAGNOSIS — E66.01 CLASS 3 SEVERE OBESITY DUE TO EXCESS CALORIES WITH SERIOUS COMORBIDITY AND BODY MASS INDEX (BMI) OF 50.0 TO 59.9 IN ADULT: ICD-10-CM

## 2023-10-18 DIAGNOSIS — Z12.4 SCREENING FOR MALIGNANT NEOPLASM OF CERVIX: ICD-10-CM

## 2023-10-18 DIAGNOSIS — Z12.11 SCREEN FOR COLON CANCER: ICD-10-CM

## 2023-10-18 LAB
BILIRUB BLD-MCNC: NEGATIVE MG/DL
CLARITY, POC: CLEAR
COLOR UR: YELLOW
EXPIRATION DATE: ABNORMAL
GLUCOSE UR STRIP-MCNC: NEGATIVE MG/DL
HBA1C MFR BLD: 6.4 % (ref 4.5–5.7)
KETONES UR QL: NEGATIVE
LEUKOCYTE EST, POC: NEGATIVE
Lab: ABNORMAL
NITRITE UR-MCNC: NEGATIVE MG/ML
PH UR: 5 [PH] (ref 5–8)
PROT UR STRIP-MCNC: NEGATIVE MG/DL
RBC # UR STRIP: NEGATIVE /UL
SP GR UR: 1.02 (ref 1–1.03)
UROBILINOGEN UR QL: NORMAL

## 2023-10-18 NOTE — ASSESSMENT & PLAN NOTE
Patient's (Body mass index is 50.99 kg/m².) indicates that they are morbidly/severely obese (BMI > 40 or > 35 with obesity - related health condition) with health conditions that include dyslipidemias . Weight is worsening. BMI  is above average; BMI management plan is completed. We discussed portion control and increasing exercise.

## 2023-11-02 ENCOUNTER — HOSPITAL ENCOUNTER (OUTPATIENT)
Dept: ULTRASOUND IMAGING | Facility: HOSPITAL | Age: 58
Discharge: HOME | End: 2023-11-02
Payer: COMMERCIAL

## 2023-11-02 ENCOUNTER — HOSPITAL ENCOUNTER (OUTPATIENT)
Dept: MAMMOGRAPHY | Facility: HOSPITAL | Age: 58
Discharge: HOME | End: 2023-11-02
Payer: COMMERCIAL

## 2023-11-02 DIAGNOSIS — Z12.39 ENCOUNTER FOR SCREENING FOR MALIGNANT NEOPLASM OF BREAST, UNSPECIFIED SCREENING MODALITY: ICD-10-CM

## 2023-11-02 DIAGNOSIS — R09.89 CAROTID BRUIT, UNSPECIFIED LATERALITY: ICD-10-CM

## 2023-11-02 PROCEDURE — 77063 BREAST TOMOSYNTHESIS BI: CPT

## 2023-11-02 PROCEDURE — 77067 SCR MAMMO BI INCL CAD: CPT

## 2023-11-02 PROCEDURE — 93880 EXTRACRANIAL BILAT STUDY: CPT

## 2024-01-23 NOTE — PROGRESS NOTES
Subjective   Candice Swan is a 58 y.o. female.     Chief Complaint   Patient presents with    Blood Sugar Problem    Hyperlipidemia    URI       History of Present Illness  Influenza immunization was not given due to patient refusal    Hyperlipidemia  This is a chronic problem. The current episode started more than 1 year ago. Recent lipid tests were reviewed and are normal. Exacerbating diseases include diabetes and obesity. Associated symptoms include leg pain. Pertinent negatives include no chest pain or shortness of breath. Current antihyperlipidemic treatment includes diet change and exercise. There are no compliance problems.  Risk factors for coronary artery disease include obesity.   Blood Sugar Problem  This is a recurrent problem. The current episode started more than 1 month ago. The problem has been unchanged. Associated symptoms include congestion and headaches. Pertinent negatives include no abdominal pain, chest pain, chills, diaphoresis or nausea. Nothing aggravates the symptoms. She has tried nothing for the symptoms.   URI   This is a new problem. The current episode started in the past 7 days. The problem has been unchanged. Associated symptoms include congestion, headaches and sinus pain. Pertinent negatives include no abdominal pain, chest pain or nausea.            I personally reviewed and updated the patient's allergies, medications, problem list, and past medical, surgical, social, and family history. I have reviewed and confirmed the accuracy of the History of Present Illness and Review of Symptoms as documented by the MA/WANN/RN. Brandin Sunshine MD    Family History   Problem Relation Age of Onset    Diabetes Mother     Heart disease Mother        Social History     Tobacco Use    Smoking status: Never     Passive exposure: Never    Smokeless tobacco: Never   Vaping Use    Vaping Use: Never used   Substance Use Topics    Alcohol use: No    Drug use: No       Past Surgical History:    Procedure Laterality Date    CARDIOVASCULAR STRESS TEST  06/11/2019    abnormal    CHOLECYSTECTOMY      ECHO - CONVERTED  2019       Patient Active Problem List   Diagnosis    Degeneration of intervertebral disc at C5-C6 level    Numbness and tingling in left arm    Umbilical hernia    Osteoarthritis of both knees    Class 3 severe obesity due to excess calories with serious comorbidity and body mass index (BMI) of 50.0 to 59.9 in adult    Abnormal nuclear stress test    Fatigue    Allergic rhinitis    Fatty liver    Mixed hyperlipidemia    Empty sella    Cervical disc disease    Lipoma    GERD (gastroesophageal reflux disease)    Spasm eyelid    SOB (shortness of breath)    Dysuria    Annual visit for general adult medical examination with abnormal findings    Screening for malignant neoplasm of breast    Screening for malignant neoplasm of cervix    Acute bacterial sinusitis    Seasonal allergic rhinitis due to pollen    Acute pain of left knee    Neuropathy    Screen for colon cancer    Acute left-sided thoracic back pain    TIA (transient ischemic attack)    Type 2 diabetes mellitus with neurologic complication, without long-term current use of insulin         Current Outpatient Medications:     ASPIRIN 81 PO, Take  by mouth., Disp: , Rfl:     cyclobenzaprine (FLEXERIL) 10 MG tablet, Take 1 tablet by mouth Daily As Needed for Muscle Spasms. 1 tablet to half tablet as needed nightly, Disp: 30 tablet, Rfl: 2    erythromycin (ROMYCIN) 5 MG/GM ophthalmic ointment, Apply  to eye(s) as directed by provider 6 (Six) Times a Day., Disp: 1 each, Rfl: 0    hydrocortisone (ANUSOL-HC) 25 MG suppository, Insert 1 suppository into the rectum 2 (Two) Times a Day., Disp: 24 each, Rfl: 5    azithromycin (ZITHROMAX) 250 MG tablet, Take 2 tablets the first day, then 1 tablet daily for 4 days., Disp: 6 tablet, Rfl: 0    furosemide (LASIX) 20 MG tablet, Take 1 tablet by mouth Daily As Needed (swelling). (Patient not taking:  "Reported on 1/24/2024), Disp: 30 tablet, Rfl: 5    meloxicam (MOBIC) 15 MG tablet, Take 1 tablet by mouth Daily As Needed for Moderate Pain. (Patient not taking: Reported on 1/24/2024), Disp: 90 tablet, Rfl: 1    potassium chloride (K-DUR,KLOR-CON) 20 MEQ CR tablet, Take 1 tablet by mouth Daily. (Patient not taking: Reported on 1/24/2024), Disp: 30 tablet, Rfl: 5    Semaglutide,0.25 or 0.5MG/DOS, (Ozempic, 0.25 or 0.5 MG/DOSE,) 2 MG/3ML solution pen-injector, Inject 0.25 mg under the skin into the appropriate area as directed 1 (One) Time Per Week., Disp: 3 mL, Rfl: 1          Review of Systems   Constitutional:  Negative for chills and diaphoresis.   HENT:  Positive for congestion. Negative for trouble swallowing and voice change.    Eyes:  Negative for visual disturbance.   Respiratory:  Negative for shortness of breath.    Cardiovascular:  Negative for chest pain and palpitations.   Gastrointestinal:  Negative for abdominal pain and nausea.   Endocrine: Negative for polydipsia and polyphagia.   Genitourinary:  Positive for dyspareunia. Negative for hematuria.   Musculoskeletal:  Negative for neck stiffness.   Skin:  Negative for color change and pallor.   Allergic/Immunologic: Negative for immunocompromised state.   Neurological:  Negative for seizures and syncope.   Hematological:  Negative for adenopathy.   Psychiatric/Behavioral:  Negative for hallucinations, sleep disturbance and suicidal ideas.        I have reviewed and confirmed the accuracy of the ROS as documented by the MA/LPN/RN Brandin Sunshine MD      Objective   /80 (BP Location: Left arm, Patient Position: Sitting, Cuff Size: Adult)   Pulse 113   Temp 97.5 °F (36.4 °C) (Temporal)   Resp 20   Ht 165.1 cm (65\")   Wt (!) 137 kg (301 lb 1.6 oz)   LMP 08/01/2019   SpO2 96%   BMI 50.11 kg/m²   BP Readings from Last 3 Encounters:   01/24/24 146/80   10/18/23 124/74   08/22/23 130/86     Wt Readings from Last 3 Encounters:   01/24/24 (!) 137 kg " "(301 lb 1.6 oz)   10/18/23 (!) 139 kg (306 lb 6.4 oz)   08/22/23 (!) 136 kg (300 lb 12.8 oz)           Data / Lab Results:    Hemoglobin A1C   Date Value Ref Range Status   01/24/2024 6.9 (A) 4.5 - 5.7 % Final   10/18/2023 6.4 (A) 4.5 - 5.7 % Final   10/05/2022 6.1 % Final        Lab Results   Component Value Date    LDL 84 10/02/2023    LDL 73 09/06/2022    LDL 62 05/02/2019     Lab Results   Component Value Date    CHOL 129 05/02/2019    CHOL 136 11/01/2016     Lab Results   Component Value Date    TRIG 97 10/02/2023    TRIG 79 09/06/2022    TRIG 98 05/02/2019     Lab Results   Component Value Date    HDL 51 10/02/2023    HDL 51 09/06/2022    HDL 49 (L) 05/02/2019     No results found for: \"PSA\"  Lab Results   Component Value Date    WBC 7.4 10/02/2023    HGB 14.0 10/02/2023    HCT 42.4 10/02/2023    MCV 90 10/02/2023     10/02/2023     Lab Results   Component Value Date    TSH 3.360 10/02/2023      Lab Results   Component Value Date    GLUCOSE 129 (H) 10/02/2023    BUN 12 10/02/2023    CREATININE 0.61 10/02/2023    EGFRIFNONA 108 05/02/2019    EGFRIFAFRI 126 05/02/2019    BCR 20 10/02/2023    K 4.4 10/02/2023    CO2 24 10/02/2023    CALCIUM 9.4 10/02/2023    PROTENTOTREF 6.9 10/02/2023    ALBUMIN 4.0 10/02/2023    LABIL2 1.4 10/02/2023    AST 17 10/02/2023    ALT 24 10/02/2023     No results found for: \"FARHANA\", \"RF\", \"SEDRATE\"   Lab Results   Component Value Date    CRP 1.74 (H) 08/08/2023      No results found for: \"IRON\", \"TIBC\", \"FERRITIN\"   No results found for: \"[id number]\"         Physical Exam  Constitutional:       Appearance: She is well-developed. She is not diaphoretic.   HENT:      Head: Normocephalic.      Right Ear: Tympanic membrane, ear canal and external ear normal.      Left Ear: Tympanic membrane, ear canal and external ear normal.      Nose: Nose normal.   Eyes:      General: Lids are normal.      Extraocular Movements:      Right eye: No nystagmus.      Left eye: No nystagmus.      " Conjunctiva/sclera: Conjunctivae normal.      Right eye: Right conjunctiva is not injected. No hemorrhage.     Left eye: Left conjunctiva is not injected. No hemorrhage.     Pupils: Pupils are equal, round, and reactive to light.      Funduscopic exam:     Right eye: No hemorrhage, exudate, AV nicking, arteriolar narrowing or papilledema.         Left eye: No hemorrhage, exudate, AV nicking, arteriolar narrowing or papilledema.   Neck:      Thyroid: No thyromegaly.      Vascular: No carotid bruit or JVD.      Trachea: No tracheal deviation.   Cardiovascular:      Rate and Rhythm: Normal rate and regular rhythm.      Heart sounds: Normal heart sounds. No murmur heard.     No friction rub. No gallop.   Pulmonary:      Effort: Pulmonary effort is normal.      Breath sounds: Normal breath sounds. No stridor. No decreased breath sounds, wheezing or rales.   Abdominal:      General: Bowel sounds are normal. There is no distension.      Palpations: Abdomen is soft. There is no mass.      Tenderness: There is no abdominal tenderness. There is no guarding or rebound.      Hernia: No hernia is present.   Lymphadenopathy:      Head:      Right side of head: No submental, submandibular, tonsillar, preauricular, posterior auricular or occipital adenopathy.      Left side of head: No submental, submandibular, tonsillar, preauricular, posterior auricular or occipital adenopathy.      Cervical: No cervical adenopathy.   Skin:     General: Skin is warm and dry.      Coloration: Skin is not pale.   Neurological:      Mental Status: She is alert and oriented to person, place, and time.      Cranial Nerves: No cranial nerve deficit.      Sensory: No sensory deficit.      Coordination: Coordination normal.      Gait: Gait normal.      Deep Tendon Reflexes: Reflexes are normal and symmetric.           Assessment & Plan      Medications        Problem List         LOS    [facility] maintenance.  Doing well, vaccines current.  pneumonia  vaccine sched.  Discussed calcium vitamin D.  D iscussed coated baby aspirin daily.  Discussed health maintenance, screening test last modification.  Mammogram benign 2020, repeat scheduled.  Pap/H PV neg 2022.  Type II diabetes.  New onset A1c to 6.9, start ozempic.   Discussed diet, exercise, lifestyle modification, weight loss.  Monitor BS at home .  Follow up recheck. Plan add lisinipril.  Elevated LFTs.  Improved/resolved on recheck.  Mild, new onset.  DDx includes fatty liver add fish oil.  Follow-up recheck.  Consider further eval persistent elevation.  Left leg pain evaluated with numbness/tingling.    Improved today, x-ray with mild signs of lumbar degeneration, continue as needed nighttime muscle relaxant, rehabilitation exercise discussed, nightly amitriptyline.  Follow-up recheck.  Add as needed meloxicam.  Lower extremity edema.    Improved today, taking furosemide intermittently/as needed.  Benign exam today, no sign of DVT.  DDx includes venous insufficiency, sleep apnea.  Echo benign 2019.  Start Lasix/potassium/JOAQUIN hose.  Discussed low-sodium diet.  Recommend sleep eval she declines currently but states she will consider.  Follow-up recheck.  Consider further eval if persistent symptoms.  Allergic rhinitis.  Over-the-counter Claritin or Zyrtec.  Umbilical hernia.  Stable today.  Followed by Dr. Diop, working on weight loss, monitoring. S/s incarceration discussed.  2.6 cm per CT scan 6/23.  Obesity.  Improved, working on diet and exercise, has lost weight.  Recommend bariatric surgery, sleep study she declines.  Add ozempic.   Abnormal stress test.  Stress Cardiolite with abnormal area, subsequent echo normal.  Followed by cardiology.  Exercise has been recommended.  Thoracic back pain.  Strain.  Benign exam today.  Start as needed meloxicam.  Rehabilitation exercises discussed.  Add PRN muscle relaxer.  Follow-up recheck.  Consider imaging if persistent symptoms.  Osteoarthritis knees.  Improved  today s/p cartilage ijxn, followed by ortho / follow up schef.  Improved on meloxicam as needed, improved status post recent injection.  Still with pain limiting exercise check x-rays, start PT.  Follow-up recheck.  Consider orthopedics referral, repeat injection if persistent symptoms..  symptoms worse on right.  Ice, rehabilitation exercise discussed.  Call return if worsening symptoms.  Colon cancer screening.  Recommend colonoscopy/Cologuard, Cologuard scheduled.  Shortness of breath.  Multifactorial, likely secondary to deconditioning.  Has had cardiology eval.  DDx includes COPD add inhaler.  Increase exercise.  Consider PFTs.  Follow-up recheck.  Cellulitis abdomen.  Improved today has completed course antibiotics.  Overlying hernia, complicating abrasion. Improving today, continue antibiotics/ change to bactrim.  topical care discussed.   Call / return if worsening symptoms.   Viral conjunctivitis.  Benign exam today, start antibiotics.   Call or return if worsening or persistent symptoms.  Blood in stool.  Overall benign exam today, likely secondary to hemorrhoids.  Increase fluids/fiber.  Topical care discussed.  Check hemoglobin.  Cologuard rescheduled.  Call return if worsening symptoms.  Facial numbness/transient weakness.  Clinically improved today, status post eval per ED with benign CT brain, has had benign Optho eval per her report, will get records.  DDx includes TIA, neuropathy, She stopped coated baby aspirin daily, restart, MRI brain benign, EKG benign today, Holter with short vruns onky max 7 beats, carotid Dopplers, echocardiogram with bubble study scheduled.  Close follow-up scheduled.  Call return if recurrent symptoms.      Diagnoses and all orders for this visit:    1. Elevated fasting blood sugar (Primary)  -     POC Glycosylated Hemoglobin (Hb A1C)  -     POCT Glucose    2. Acute bacterial sinusitis  -     azithromycin (ZITHROMAX) 250 MG tablet; Take 2 tablets the first day, then 1  tablet daily for 4 days.  Dispense: 6 tablet; Refill: 0    3. Mixed hyperlipidemia    4. Class 3 severe obesity due to excess calories with serious comorbidity and body mass index (BMI) of 50.0 to 59.9 in adult  Assessment & Plan:  Patient's (Body mass index is 50.11 kg/m².) indicates that they are morbidly/severely obese (BMI > 40 or > 35 with obesity - related health condition) with health conditions that include dyslipidemias . Weight is unchanged. BMI  is above average; BMI management plan is completed. We discussed portion control and increasing exercise.       5. Type 2 diabetes mellitus with hyperglycemia, with long-term current use of insulin  -     Semaglutide,0.25 or 0.5MG/DOS, (Ozempic, 0.25 or 0.5 MG/DOSE,) 2 MG/3ML solution pen-injector; Inject 0.25 mg under the skin into the appropriate area as directed 1 (One) Time Per Week.  Dispense: 3 mL; Refill: 1    6. Neuropathy    7. Type 2 diabetes mellitus with diabetic polyneuropathy, without long-term current use of insulin              Expected course, medications, and adverse effects discussed.  Call or return if worsening or persistent symptoms.  I wore protective equipment throughout this patient encounter including a mask, gloves, and eye protection.  Hand hygiene was performed before donning protective equipment and after removal when leaving the room.  The complete contents of the Assessment and Plan and Data/Labs Results as documented above have been reviewed and addressed by myself with the patient today as part of an ongoing evaluation / treatment plan.  If some of the documentation has been copied from a previous note and is unchanged it indicates that this problem / plan has been assessed today but is stable from a previous visit and no changes have been recommended.

## 2024-01-24 ENCOUNTER — OFFICE VISIT (OUTPATIENT)
Dept: FAMILY MEDICINE CLINIC | Facility: CLINIC | Age: 59
End: 2024-01-24
Payer: COMMERCIAL

## 2024-01-24 VITALS
SYSTOLIC BLOOD PRESSURE: 146 MMHG | WEIGHT: 293 LBS | BODY MASS INDEX: 48.82 KG/M2 | DIASTOLIC BLOOD PRESSURE: 80 MMHG | OXYGEN SATURATION: 96 % | TEMPERATURE: 97.5 F | HEIGHT: 65 IN | HEART RATE: 113 BPM | RESPIRATION RATE: 20 BRPM

## 2024-01-24 DIAGNOSIS — R73.01 ELEVATED FASTING BLOOD SUGAR: Primary | ICD-10-CM

## 2024-01-24 DIAGNOSIS — B96.89 ACUTE BACTERIAL SINUSITIS: ICD-10-CM

## 2024-01-24 DIAGNOSIS — E66.01 CLASS 3 SEVERE OBESITY DUE TO EXCESS CALORIES WITH SERIOUS COMORBIDITY AND BODY MASS INDEX (BMI) OF 50.0 TO 59.9 IN ADULT: ICD-10-CM

## 2024-01-24 DIAGNOSIS — J01.90 ACUTE BACTERIAL SINUSITIS: ICD-10-CM

## 2024-01-24 DIAGNOSIS — G62.9 NEUROPATHY: ICD-10-CM

## 2024-01-24 DIAGNOSIS — E78.2 MIXED HYPERLIPIDEMIA: ICD-10-CM

## 2024-01-24 DIAGNOSIS — Z79.4 TYPE 2 DIABETES MELLITUS WITH HYPERGLYCEMIA, WITH LONG-TERM CURRENT USE OF INSULIN: ICD-10-CM

## 2024-01-24 DIAGNOSIS — E11.65 TYPE 2 DIABETES MELLITUS WITH HYPERGLYCEMIA, WITH LONG-TERM CURRENT USE OF INSULIN: ICD-10-CM

## 2024-01-24 DIAGNOSIS — E11.42 TYPE 2 DIABETES MELLITUS WITH DIABETIC POLYNEUROPATHY, WITHOUT LONG-TERM CURRENT USE OF INSULIN: ICD-10-CM

## 2024-01-24 LAB
EXPIRATION DATE: ABNORMAL
GLUCOSE BLDC GLUCOMTR-MCNC: 275 MG/DL (ref 70–130)
HBA1C MFR BLD: 6.9 % (ref 4.5–5.7)
Lab: ABNORMAL

## 2024-01-24 RX ORDER — SEMAGLUTIDE 0.68 MG/ML
0.25 INJECTION, SOLUTION SUBCUTANEOUS WEEKLY
Qty: 3 ML | Refills: 1 | Status: SHIPPED | OUTPATIENT
Start: 2024-01-24

## 2024-01-24 RX ORDER — AZITHROMYCIN 250 MG/1
TABLET, FILM COATED ORAL
Qty: 6 TABLET | Refills: 0 | Status: SHIPPED | OUTPATIENT
Start: 2024-01-24

## 2024-01-24 NOTE — ASSESSMENT & PLAN NOTE
Patient's (Body mass index is 50.11 kg/m².) indicates that they are morbidly/severely obese (BMI > 40 or > 35 with obesity - related health condition) with health conditions that include dyslipidemias . Weight is unchanged. BMI  is above average; BMI management plan is completed. We discussed portion control and increasing exercise.

## 2024-01-26 ENCOUNTER — TELEPHONE (OUTPATIENT)
Dept: FAMILY MEDICINE CLINIC | Facility: CLINIC | Age: 59
End: 2024-01-26
Payer: COMMERCIAL

## 2024-02-03 PROBLEM — E11.49 TYPE 2 DIABETES MELLITUS WITH NEUROLOGIC COMPLICATION, WITHOUT LONG-TERM CURRENT USE OF INSULIN: Status: ACTIVE | Noted: 2024-02-03

## 2024-02-07 ENCOUNTER — TELEPHONE (OUTPATIENT)
Dept: FAMILY MEDICINE CLINIC | Facility: CLINIC | Age: 59
End: 2024-02-07
Payer: COMMERCIAL

## 2024-02-09 ENCOUNTER — HOSPITAL ENCOUNTER (OUTPATIENT)
Dept: CARDIOLOGY | Facility: HOSPITAL | Age: 59
Discharge: HOME | End: 2024-02-09
Admitting: FAMILY MEDICINE
Payer: COMMERCIAL

## 2024-02-09 VITALS
BODY MASS INDEX: 48.82 KG/M2 | WEIGHT: 293 LBS | HEIGHT: 65 IN | DIASTOLIC BLOOD PRESSURE: 83 MMHG | SYSTOLIC BLOOD PRESSURE: 143 MMHG

## 2024-02-09 DIAGNOSIS — R07.9 CHEST PAIN, UNSPECIFIED TYPE: ICD-10-CM

## 2024-02-09 LAB
BH CV ECHO MEAS - ACS: 1.95 CM
BH CV ECHO MEAS - AO MAX PG: 4.1 MMHG
BH CV ECHO MEAS - AO MEAN PG: 2.23 MMHG
BH CV ECHO MEAS - AO ROOT DIAM: 3.4 CM
BH CV ECHO MEAS - AO V2 MAX: 100.8 CM/SEC
BH CV ECHO MEAS - AO V2 VTI: 20.3 CM
BH CV ECHO MEAS - AVA(I,D): 2.7 CM2
BH CV ECHO MEAS - EDV(CUBED): 150.4 ML
BH CV ECHO MEAS - EDV(MOD-SP4): 78.6 ML
BH CV ECHO MEAS - EF(MOD-SP4): 53.2 %
BH CV ECHO MEAS - ESV(CUBED): 56.7 ML
BH CV ECHO MEAS - ESV(MOD-SP4): 36.7 ML
BH CV ECHO MEAS - FS: 27.8 %
BH CV ECHO MEAS - IVS/LVPW: 0.98 CM
BH CV ECHO MEAS - IVSD: 1.01 CM
BH CV ECHO MEAS - LA DIMENSION: 3.8 CM
BH CV ECHO MEAS - LV DIASTOLIC VOL/BSA (35-75): 33.4 CM2
BH CV ECHO MEAS - LV MASS(C)D: 207.1 GRAMS
BH CV ECHO MEAS - LV MAX PG: 2.8 MMHG
BH CV ECHO MEAS - LV MEAN PG: 1.36 MMHG
BH CV ECHO MEAS - LV SYSTOLIC VOL/BSA (12-30): 15.6 CM2
BH CV ECHO MEAS - LV V1 MAX: 84.1 CM/SEC
BH CV ECHO MEAS - LV V1 VTI: 17.8 CM
BH CV ECHO MEAS - LVIDD: 5.3 CM
BH CV ECHO MEAS - LVIDS: 3.8 CM
BH CV ECHO MEAS - LVOT AREA: 3.1 CM2
BH CV ECHO MEAS - LVOT DIAM: 2 CM
BH CV ECHO MEAS - LVPWD: 1.03 CM
BH CV ECHO MEAS - MR MAX PG: 69.8 MMHG
BH CV ECHO MEAS - MR MAX VEL: 416.6 CM/SEC
BH CV ECHO MEAS - MV A MAX VEL: 82.9 CM/SEC
BH CV ECHO MEAS - MV DEC SLOPE: 275.8 CM/SEC2
BH CV ECHO MEAS - MV DEC TIME: 0.27 SEC
BH CV ECHO MEAS - MV E MAX VEL: 73.6 CM/SEC
BH CV ECHO MEAS - MV E/A: 0.89
BH CV ECHO MEAS - MV MAX PG: 4.1 MMHG
BH CV ECHO MEAS - MV MEAN PG: 1.78 MMHG
BH CV ECHO MEAS - MV V2 VTI: 20.2 CM
BH CV ECHO MEAS - MVA(VTI): 2.8 CM2
BH CV ECHO MEAS - PA ACC TIME: 0.1 SEC
BH CV ECHO MEAS - PA V2 MAX: 121.3 CM/SEC
BH CV ECHO MEAS - PI END-D VEL: 143.1 CM/SEC
BH CV ECHO MEAS - PULM A REVS DUR: 0.09 SEC
BH CV ECHO MEAS - PULM A REVS VEL: 34.6 CM/SEC
BH CV ECHO MEAS - PULM DIAS VEL: 50.1 CM/SEC
BH CV ECHO MEAS - PULM S/D: 0.82
BH CV ECHO MEAS - PULM SYS VEL: 41 CM/SEC
BH CV ECHO MEAS - RAP SYSTOLE: 10 MMHG
BH CV ECHO MEAS - RVSP: 41.2 MMHG
BH CV ECHO MEAS - SI(MOD-SP4): 17.8 ML/M2
BH CV ECHO MEAS - SV(LVOT): 55.7 ML
BH CV ECHO MEAS - SV(MOD-SP4): 41.8 ML
BH CV ECHO MEAS - TAPSE (>1.6): 2.7 CM
BH CV ECHO MEAS - TR MAX PG: 31.2 MMHG
BH CV ECHO MEAS - TR MAX VEL: 277.6 CM/SEC
BH CV ECHO SHUNT ASSESSMENT PERFORMED (HIDDEN SCRIPTING): 1
BH CV XLRA - RV BASE: 3.7 CM
BH CV XLRA - RV MID: 2 CM

## 2024-02-09 PROCEDURE — 93306 TTE W/DOPPLER COMPLETE: CPT

## 2024-02-29 ENCOUNTER — TELEPHONE (OUTPATIENT)
Dept: FAMILY MEDICINE CLINIC | Facility: CLINIC | Age: 59
End: 2024-02-29
Payer: COMMERCIAL

## 2024-02-29 NOTE — TELEPHONE ENCOUNTER
Pharmacy Name: [facility] [place], [place] - [address]   - [phone] [facility] [phone] FX     What medication are you calling in regards to: Semaglutide,0.25 or 0.5MG/DOS, (Ozempic, 0.25 or 0.5 MG/DOSE,) 2 MG/3ML solution pen-injector     What question does the pharmacy have: DAUGHTER SPOKE WITH [facility] TODAY, 2/29/2024 AND WAS TOLD THEY DO NOT HAVE SCRIPT.    [facility] IS REQUESTING OFFICE FOLLOW UP ON REFILL       Increased Nutrient Needs...

## 2024-03-01 NOTE — TELEPHONE ENCOUNTER
Called pharmacy and spoke to earnestine. She said they do have script from 1/24/24 and it was put on hold. It was probably because it needed pa. We did pa and it was approved on 2/8/24. She ran it and it did go through. She is getting it ready for patient now and it should cost about $25.   Called and left voicemail for daughter

## 2024-05-08 ENCOUNTER — OFFICE VISIT (OUTPATIENT)
Dept: FAMILY MEDICINE CLINIC | Facility: CLINIC | Age: 59
End: 2024-05-08

## 2024-05-08 VITALS
BODY MASS INDEX: 48.82 KG/M2 | WEIGHT: 293 LBS | DIASTOLIC BLOOD PRESSURE: 80 MMHG | SYSTOLIC BLOOD PRESSURE: 122 MMHG | HEIGHT: 65 IN | RESPIRATION RATE: 18 BRPM | HEART RATE: 117 BPM | OXYGEN SATURATION: 97 % | TEMPERATURE: 98.4 F

## 2024-05-08 DIAGNOSIS — E11.65 TYPE 2 DIABETES MELLITUS WITH HYPERGLYCEMIA, WITH LONG-TERM CURRENT USE OF INSULIN: Primary | ICD-10-CM

## 2024-05-08 DIAGNOSIS — Z79.4 TYPE 2 DIABETES MELLITUS WITH HYPERGLYCEMIA, WITH LONG-TERM CURRENT USE OF INSULIN: Primary | ICD-10-CM

## 2024-05-08 DIAGNOSIS — E78.2 MIXED HYPERLIPIDEMIA: ICD-10-CM

## 2024-05-08 DIAGNOSIS — G45.9 TIA (TRANSIENT ISCHEMIC ATTACK): ICD-10-CM

## 2024-05-08 DIAGNOSIS — E66.01 CLASS 3 SEVERE OBESITY DUE TO EXCESS CALORIES WITH SERIOUS COMORBIDITY AND BODY MASS INDEX (BMI) OF 45.0 TO 49.9 IN ADULT: ICD-10-CM

## 2024-05-08 LAB
BILIRUB BLD-MCNC: NEGATIVE MG/DL
CLARITY, POC: CLEAR
COLOR UR: YELLOW
EXPIRATION DATE: ABNORMAL
EXPIRATION DATE: NORMAL
GLUCOSE UR STRIP-MCNC: NEGATIVE MG/DL
HBA1C MFR BLD: 6.5 % (ref 4.5–5.7)
KETONES UR QL: NEGATIVE
LEUKOCYTE EST, POC: NEGATIVE
Lab: ABNORMAL
Lab: NORMAL
NITRITE UR-MCNC: NEGATIVE MG/ML
PH UR: 5 [PH] (ref 5–8)
POC CREATININE URINE: 0
POC MICROALBUMIN URINE: 20
PROT UR STRIP-MCNC: NEGATIVE MG/DL
RBC # UR STRIP: ABNORMAL /UL
SP GR UR: 1.01 (ref 1–1.03)
UROBILINOGEN UR QL: NORMAL

## 2024-05-08 RX ORDER — LANCETS
EACH MISCELLANEOUS
COMMUNITY
Start: 2024-01-24

## 2024-05-08 RX ORDER — BLOOD SUGAR DIAGNOSTIC
STRIP MISCELLANEOUS
COMMUNITY
Start: 2024-01-24

## 2024-05-08 RX ORDER — BLOOD-GLUCOSE METER
EACH MISCELLANEOUS
COMMUNITY
Start: 2024-01-24

## 2024-06-05 ENCOUNTER — OFFICE VISIT (OUTPATIENT)
Dept: FAMILY MEDICINE CLINIC | Facility: CLINIC | Age: 59
End: 2024-06-05

## 2024-06-05 VITALS
RESPIRATION RATE: 20 BRPM | BODY MASS INDEX: 48.82 KG/M2 | DIASTOLIC BLOOD PRESSURE: 78 MMHG | OXYGEN SATURATION: 95 % | HEART RATE: 103 BPM | WEIGHT: 293 LBS | SYSTOLIC BLOOD PRESSURE: 114 MMHG | TEMPERATURE: 98.1 F | HEIGHT: 65 IN

## 2024-06-05 DIAGNOSIS — L03.115 CELLULITIS OF RIGHT LOWER EXTREMITY: ICD-10-CM

## 2024-06-05 DIAGNOSIS — R31.9 HEMATURIA, UNSPECIFIED TYPE: ICD-10-CM

## 2024-06-05 DIAGNOSIS — N30.01 ACUTE CYSTITIS WITH HEMATURIA: Primary | ICD-10-CM

## 2024-06-05 DIAGNOSIS — R30.0 DYSURIA: ICD-10-CM

## 2024-06-05 LAB
BILIRUB BLD-MCNC: NEGATIVE MG/DL
CLARITY, POC: CLEAR
COLOR UR: YELLOW
EXPIRATION DATE: ABNORMAL
GLUCOSE UR STRIP-MCNC: NEGATIVE MG/DL
KETONES UR QL: NEGATIVE
LEUKOCYTE EST, POC: NEGATIVE
Lab: ABNORMAL
NITRITE UR-MCNC: NEGATIVE MG/ML
PH UR: 6 [PH] (ref 5–8)
PROT UR STRIP-MCNC: NEGATIVE MG/DL
RBC # UR STRIP: ABNORMAL /UL
SP GR UR: 1.03 (ref 1–1.03)
UROBILINOGEN UR QL: ABNORMAL

## 2024-06-05 RX ORDER — DOCUSATE SODIUM 100 MG/1
100 CAPSULE, LIQUID FILLED ORAL 2 TIMES DAILY
COMMUNITY

## 2024-06-05 RX ORDER — SULFAMETHOXAZOLE AND TRIMETHOPRIM 800; 160 MG/1; MG/1
1 TABLET ORAL EVERY 12 HOURS
Qty: 14 TABLET | Refills: 0 | Status: SHIPPED | OUTPATIENT
Start: 2024-06-05 | End: 2024-06-12

## 2024-06-05 RX ORDER — CALCIUM POLYCARBOPHIL 625 MG
625 TABLET ORAL DAILY
COMMUNITY

## 2024-06-05 NOTE — PROGRESS NOTES
Chief Complaint  Burning with Urination and Leg Redness    Subjective          Candice is a 59 y.o. female  who presents to [facility]     Patient Care Team:  Brandin Sunshine MD as PCP - General (Family Medicine)  Brandin Sunshine MD as PCP - Family Medicine     History of Present Illness  Candice is here today for dysuria, back pain and leg redness    Location: dysuria and low back pain  Quality: burning with urination  Severity: moderate  Duration: for 8 days  Timing: persistent  Context: Last UTI  was about 3 years ago  No recent UTI  Alleviating factors: nothing makes better  Aggravating factors: nothing makes worse  Associated Symptoms: no fever, + nocturia, + urinary frequency, + urinary urgency, no vaginal discharge      Also has right lower leg redness and feels tight   She took amoxicillin from [place] for 3 days which helped to decrease the redness  She was at someone's home recently that had cats and dogs and her leg was scratched.  Tdap up to date.      Candice Swan  has a past medical history of Allergic rhinitis, Cervical disc disease, Empty sella, Fatty liver, Lipoma, Mixed hyperlipidemia, SOB (shortness of breath), and Umbilical hernia.      Review of Systems   Constitutional:  Negative for fever.   Respiratory:  Negative for cough and shortness of breath.    Cardiovascular:  Positive for leg swelling (bilateral). Negative for chest pain.   Gastrointestinal:  Positive for constipation. Negative for abdominal pain, nausea and vomiting.   Genitourinary:  Positive for dysuria, frequency and urgency. Negative for hematuria and vaginal discharge.        + nocturia  No vaginal itching   Musculoskeletal:  Positive for back pain.   Skin:  Positive for color change (right lower leg).        Family History   Problem Relation Age of Onset    Diabetes Mother     Heart disease Mother         Past Surgical History:   Procedure Laterality Date    CARDIOVASCULAR STRESS TEST  06/11/2019     abnormal    CHOLECYSTECTOMY      ECHO - CONVERTED  2019        Social History     Socioeconomic History    Marital status:     Number of children: 3    Years of education: 6TH GRADE   Tobacco Use    Smoking status: Never     Passive exposure: Never    Smokeless tobacco: Never   Vaping Use    Vaping status: Never Used   Substance and Sexual Activity    Alcohol use: No    Drug use: No    Sexual activity: Defer        Immunization History   Administered Date(s) Administered    COVID-19 (MODERNA) 1st,2nd,3rd Dose Monovalent 03/24/2021, 04/29/2021    Tdap 11/01/2016       Objective       Current Outpatient Medications:     ASPIRIN 81 PO, Take  by mouth., Disp: , Rfl:     meloxicam (MOBIC) 15 MG tablet, Take 1 tablet by mouth Daily As Needed for Moderate Pain., Disp: 90 tablet, Rfl: 1    Accu-Chek Guide test strip, USE TEST STRIPS TO TEST GLUCOSE LEVELS TWICE DAILY, Disp: , Rfl:     Accu-Chek Softclix Lancets lancets, USE LANCETS TO TEST GLUCOSE TWICE DAILY, Disp: , Rfl:     Blood Glucose Monitoring Suppl (Accu-Chek Guide) w/Device kit, USE TO TEST BLOOD SUGARS TWICE DAILY, Disp: , Rfl:     docusate sodium (COLACE) 100 MG capsule, Take 1 capsule by mouth 2 (Two) Times a Day., Disp: , Rfl:     furosemide (LASIX) 20 MG tablet, Take 1 tablet by mouth Daily As Needed (swelling). (Patient not taking: Reported on 1/24/2024), Disp: 30 tablet, Rfl: 5    hydrocortisone (ANUSOL-HC) 25 MG suppository, Insert 1 suppository into the rectum 2 (Two) Times a Day. (Patient not taking: Reported on 5/8/2024), Disp: 24 each, Rfl: 5    NON FORMULARY, Inject 0.5 mL under the skin into the appropriate area as directed 1 (One) Time Per Week. Semaglutide/cyanocobalamin 1mg-0.25mg/ml (2ml vial - blue cap).  INJECT 0.5ML (50 UNITS ON INSULIN SYRINGE) INTO SKIN ONCE WEEKLY, Disp: , Rfl:     polycarbophil (calcium polycarbophil) 625 MG tablet tablet, Take 1 tablet by mouth Daily., Disp: , Rfl:     sulfamethoxazole-trimethoprim (BACTRIM  "DS,SEPTRA DS) 800-160 MG per tablet, Take 1 tablet by mouth Every 12 (Twelve) Hours for 7 days., Disp: 14 tablet, Rfl: 0    Vital Signs:      /78   Pulse 103   Temp 98.1 °F (36.7 °C) (Temporal)   Resp 20   Ht 165.1 cm (65\")   Wt 133 kg (293 lb 6.4 oz)   LMP 08/01/2019   SpO2 95%   BMI 48.82 kg/m²     Vitals:    06/05/24 1347   BP: 114/78   Pulse: 103   Resp: 20   Temp: 98.1 °F (36.7 °C)   TempSrc: Temporal   SpO2: 95%   Weight: 133 kg (293 lb 6.4 oz)   Height: 165.1 cm (65\")      Physical Exam  Vitals reviewed. Exam conducted with a chaperone present (accompanied by daughter).   Constitutional:       General: She is not in acute distress.     Appearance: Normal appearance. She is obese. She is not ill-appearing.   HENT:      Head: Normocephalic and atraumatic.      Right Ear: Tympanic membrane, ear canal and external ear normal.      Left Ear: Tympanic membrane, ear canal and external ear normal.      Mouth/Throat:      Mouth: Mucous membranes are moist.      Pharynx: Oropharynx is clear.   Eyes:      General: No scleral icterus.     Conjunctiva/sclera: Conjunctivae normal.   Cardiovascular:      Rate and Rhythm: Regular rhythm. Tachycardia present.      Heart sounds: Normal heart sounds.   Pulmonary:      Effort: Pulmonary effort is normal. No respiratory distress.      Breath sounds: Normal breath sounds. No wheezing.   Abdominal:      Palpations: Abdomen is soft.      Tenderness: There is abdominal tenderness in the suprapubic area. There is no right CVA tenderness, left CVA tenderness, guarding or rebound.   Musculoskeletal:      Cervical back: Neck supple.      Right lower leg: Edema present.      Left lower leg: Edema present.   Lymphadenopathy:      Cervical: No cervical adenopathy.   Skin:     General: Skin is warm and dry.          Neurological:      Mental Status: She is alert and oriented to person, place, and time.   Psychiatric:         Mood and Affect: Mood normal.         Behavior: " Behavior normal.        Result Review :   The following data was reviewed by: RAMON Whitney on 06/05/2024:        Office Visit on 06/05/2024   Component Date Value Ref Range Status    Color 06/05/2024 Yellow  Yellow, Straw, Dark Yellow, Marisol Final    Clarity, UA 06/05/2024 Clear  Clear Final    Specific Gravity  06/05/2024 1.030  1.005 - 1.030 Final    pH, Urine 06/05/2024 6.0  5.0 - 8.0 Final    Leukocytes 06/05/2024 Negative  Negative Final    Nitrite, UA 06/05/2024 Negative  Negative Final    Protein, POC 06/05/2024 Negative  Negative mg/dL Final    Glucose, UA 06/05/2024 Negative  Negative mg/dL Final    Ketones, UA 06/05/2024 Negative  Negative Final    Urobilinogen, UA 06/05/2024 0.2 E.U./dL  Normal, 0.2 E.U./dL Final    Bilirubin 06/05/2024 Negative  Negative Final    Blood, UA 06/05/2024 Trace (A)  Negative Final    Lot Number 06/05/2024 [id number]   Final    Expiration Date 06/05/2024 12/2,024   Final          Assessment and Plan    Diagnoses and all orders for this visit:    1. Acute cystitis with hematuria (Primary)  -     sulfamethoxazole-trimethoprim (BACTRIM DS,SEPTRA DS) 800-160 MG per tablet; Take 1 tablet by mouth Every 12 (Twelve) Hours for 7 days.  Dispense: 14 tablet; Refill: 0    2. Dysuria  -     POCT urinalysis dipstick, automated    3. Hematuria, unspecified type  -     Urine Culture - Urine, Urine, Random Void    4. Cellulitis of right lower extremity  -     sulfamethoxazole-trimethoprim (BACTRIM DS,SEPTRA DS) 800-160 MG per tablet; Take 1 tablet by mouth Every 12 (Twelve) Hours for 7 days.  Dispense: 14 tablet; Refill: 0         Urine sent for C&S  Begin antibiotic  Increase fluids.   Medication and medication adverse effects discussed.    Follow-up 5-7 days for reevaluation if not improved or sooner if needed.           Follow Up   Return if symptoms worsen or fail to improve.  Patient was given instructions and counseling regarding her condition or for health maintenance advice.  Please see specific information pulled into the AVS if appropriate.    There are no Patient Instructions on file for this visit.

## 2024-06-11 LAB
BACTERIA UR CULT: ABNORMAL
BACTERIA UR CULT: ABNORMAL
OTHER ANTIBIOTIC SUSC ISLT: ABNORMAL

## 2024-06-19 ENCOUNTER — TELEPHONE (OUTPATIENT)
Dept: FAMILY MEDICINE CLINIC | Facility: CLINIC | Age: 59
End: 2024-06-19

## 2024-06-19 NOTE — TELEPHONE ENCOUNTER
"    Caller: SIGALA CISCO    Relationship: Emergency Contact    Best call back number: [phone]    What medication are you requesting:       Summary: Inject 0.5 mL under the skin into the appropriate area as directed 1 (One) Time Per Week. Semaglutide/cyanocobalamin 1mg-0.25mg/ml (2ml vial - blue cap). INJECT 0.5ML (50 UNITS ON INSULIN SYRINGE) INTO SKIN ONCE WEEKLY, Historical Med  Dose, Route, Frequency: 0.5 mL, Subcutaneous, WeeklyOrd/Sold: 06/05/2024 (O)Ordered On: 06/05/2024ReportDx Associated: Taking: Med Note:                  Have you had these symptoms before:    [x] Yes  [] No    Have you been treated for these symptoms before:   [x] Yes  [] No    If a prescription is needed, what is your preferred pharmacy and phone number: [facility] \"COMPOUNDS ONLY\" - [facility] [address] [place] [address] [phone] [facility] [phone] FX     Additional notes: PATIENT IS OUT       "

## 2024-06-21 NOTE — TELEPHONE ENCOUNTER
"Caller: CISCO SIGALA    Relationship: Emergency Contact    Best call back number: [phone]    What medication are you requesting: SEMAGLUTIDE     What are your current symptoms: N/A    How long have you been experiencing symptoms: N/A    Have you had these symptoms before:    [x] Yes  [] No    Have you been treated for these symptoms before:   [x] Yes  [] No    If a prescription is needed, what is your preferred pharmacy and phone number: [facility] \"COMPOUNDS ONLY\" - [address] [phone] [facility] [phone]      Additional notes:    PATIENT'S DAUGHTER CALLED AND REQUESTED THE PRESCRIPTION FOR SEMAGLUTIDE TO GO TO [facility]  "

## 2024-06-24 NOTE — PROGRESS NOTES
Subjective   Candice Swan is a 59 y.o. female.     Chief Complaint   Patient presents with    Urinary Tract Infection     Follow Up      Itching     Feet    Leg Pain       Urinary Tract Infection   This is a recurrent problem. The current episode started 1 to 4 weeks ago. Associated symptoms include frequency and hematuria. Pertinent negatives include no chills, discharge, flank pain or nausea. She has tried antibiotics for the symptoms. Her past medical history is significant for recurrent UTIs.   Leg Pain   The incident occurred more than 1 week ago. There was no injury mechanism. The pain is present in the left leg, left foot, right foot and right leg. The quality of the pain is described as burning. The pain is moderate. The pain has been Worsening since onset. Associated symptoms include numbness. She reports no foreign bodies present. The symptoms are aggravated by movement and weight bearing.   Rash  This is a new problem. The current episode started 1 to 4 weeks ago. The problem has been worse since onset. The affected locations include the left lower leg, left foot, right lower leg and right foot. The rash is characterized by itchiness, redness, swelling and burning. She was exposed to nothing. Pertinent negatives include no fatigue or shortness of breath. Past treatments include nothing.            I personally reviewed and updated the patient's allergies, medications, problem list, and past medical, surgical, social, and family history. I have reviewed and confirmed the accuracy of the History of Present Illness and Review of Symptoms as documented by the MA/LETY/RN. Brandin Sunshine MD    Family History   Problem Relation Age of Onset    Diabetes Mother     Heart disease Mother        Social History     Tobacco Use    Smoking status: Never     Passive exposure: Never    Smokeless tobacco: Never   Vaping Use    Vaping status: Never Used   Substance Use Topics    Alcohol use: No    Drug use: No       Past  Surgical History:   Procedure Laterality Date    CARDIOVASCULAR STRESS TEST  06/11/2019    abnormal    CHOLECYSTECTOMY      ECHO - CONVERTED  2019       Patient Active Problem List   Diagnosis    Degeneration of intervertebral disc at C5-C6 level    Numbness and tingling in left arm    Umbilical hernia    Osteoarthritis of both knees    Class 3 severe obesity due to excess calories with serious comorbidity and body mass index (BMI) of 50.0 to 59.9 in adult    Abnormal nuclear stress test    Fatigue    Allergic rhinitis    Fatty liver    Mixed hyperlipidemia    Empty sella    Cervical disc disease    Lipoma    GERD (gastroesophageal reflux disease)    Spasm eyelid    SOB (shortness of breath)    Dysuria    Annual visit for general adult medical examination with abnormal findings    Screening for malignant neoplasm of breast    Screening for malignant neoplasm of cervix    Acute bacterial sinusitis    Seasonal allergic rhinitis due to pollen    Acute pain of left knee    Neuropathy    Screen for colon cancer    Acute left-sided thoracic back pain    TIA (transient ischemic attack)    Type 2 diabetes mellitus with neurologic complication, without long-term current use of insulin         Current Outpatient Medications:     Accu-Chek Guide test strip, USE TEST STRIPS TO TEST GLUCOSE LEVELS TWICE DAILY, Disp: , Rfl:     Accu-Chek Softclix Lancets lancets, USE LANCETS TO TEST GLUCOSE TWICE DAILY, Disp: , Rfl:     ASPIRIN 81 PO, Take  by mouth., Disp: , Rfl:     Blood Glucose Monitoring Suppl (Accu-Chek Guide) w/Device kit, USE TO TEST BLOOD SUGARS TWICE DAILY, Disp: , Rfl:     docusate sodium (COLACE) 100 MG capsule, Take 1 capsule by mouth 2 (Two) Times a Day., Disp: , Rfl:     furosemide (LASIX) 40 MG tablet, Take 1 tablet by mouth Daily As Needed (swelling). (Patient not taking: Reported on 7/17/2024), Disp: 30 tablet, Rfl: 2    polycarbophil (calcium polycarbophil) 625 MG tablet tablet, Take 1 tablet by mouth Daily.  (Patient not taking: Reported on 7/17/2024), Disp: , Rfl:     gabapentin (NEURONTIN) 100 MG capsule, Take 1 capsule by mouth 2 (Two) Times a Day As Needed (pain)., Disp: 60 capsule, Rfl: 2    hydrocortisone (ANUSOL-HC) 25 MG suppository, Insert 1 suppository into the rectum 2 (Two) Times a Day. (Patient not taking: Reported on 5/8/2024), Disp: 24 each, Rfl: 5    meloxicam (MOBIC) 15 MG tablet, Take 1 tablet by mouth Daily As Needed for Moderate Pain. (Patient not taking: Reported on 6/26/2024), Disp: 90 tablet, Rfl: 1    potassium chloride (KLOR-CON M20) 20 MEQ CR tablet, Take 1 tablet by mouth Daily., Disp: 30 tablet, Rfl: 2    Semaglutide,0.25 or 0.5MG/DOS, (Ozempic, 0.25 or 0.5 MG/DOSE,) 2 MG/3ML solution pen-injector, Inject 0.5 mg under the skin into the appropriate area as directed 1 (One) Time Per Week., Disp: 3 mL, Rfl: 3    silver sulfadiazine (SILVADENE, SSD) 1 % cream, Apply 1 Application topically to the appropriate area as directed 2 (Two) Times a Day., Disp: 400 g, Rfl: 0         Review of Systems   Constitutional:  Negative for chills, diaphoresis and fatigue.   Eyes:  Negative for visual disturbance.   Respiratory:  Negative for shortness of breath.    Cardiovascular:  Negative for chest pain and palpitations.   Gastrointestinal:  Negative for abdominal pain and nausea.   Endocrine: Negative for polydipsia and polyphagia.   Genitourinary:  Positive for frequency and hematuria. Negative for flank pain.   Musculoskeletal:  Negative for neck stiffness.   Skin:  Positive for itching and rash. Negative for color change and pallor.   Neurological:  Positive for numbness. Negative for seizures and syncope.   Hematological:  Negative for adenopathy.   Psychiatric/Behavioral:  Negative for hallucinations and suicidal ideas.        I have reviewed and confirmed the accuracy of the ROS as documented by the MA/LPN/RN Brandin Sunshine MD      Objective   /82 (BP Location: Right arm, Patient Position: Sitting,  "Cuff Size: Large Adult)   Pulse 107   Temp 98 °F (36.7 °C) (Temporal)   Resp 18   Ht 165.1 cm (65\")   Wt 132 kg (291 lb 6.4 oz)   LMP 08/01/2019   SpO2 96%   BMI 48.49 kg/m²   BP Readings from Last 3 Encounters:   07/17/24 124/80   06/26/24 126/82   06/05/24 114/78     Wt Readings from Last 3 Encounters:   07/17/24 132 kg (290 lb 9.6 oz)   06/26/24 132 kg (291 lb 6.4 oz)   06/05/24 133 kg (293 lb 6.4 oz)     Physical Exam  Constitutional:       Appearance: Normal appearance. She is well-developed. She is not diaphoretic.   HENT:      Right Ear: Hearing, tympanic membrane, ear canal and external ear normal.      Left Ear: Hearing, tympanic membrane, ear canal and external ear normal.      Nose: Nose normal. No mucosal edema or congestion.      Right Sinus: No maxillary sinus tenderness or frontal sinus tenderness.      Left Sinus: No maxillary sinus tenderness or frontal sinus tenderness.      Mouth/Throat:      Mouth: No oral lesions.      Pharynx: Uvula midline. No oropharyngeal exudate or posterior oropharyngeal erythema.      Tonsils: No tonsillar exudate.   Cardiovascular:      Rate and Rhythm: Normal rate and regular rhythm.      Pulses: Normal pulses.      Heart sounds: Normal heart sounds, S1 normal and S2 normal. No murmur heard.     No friction rub. No gallop.   Pulmonary:      Effort: Pulmonary effort is normal. No accessory muscle usage.      Breath sounds: Normal breath sounds. No stridor. No decreased breath sounds, wheezing, rhonchi or rales.   Abdominal:      General: Bowel sounds are normal. There is no distension.      Palpations: Abdomen is soft. Abdomen is not rigid. There is no mass or pulsatile mass.      Tenderness: There is no abdominal tenderness. There is no guarding or rebound. Negative signs include Aceves's sign.      Hernia: No hernia is present.   Skin:     General: Skin is warm and dry.      Coloration: Skin is not pale.      Comments: Cellulitis right lower extremity, 4 cm, no " "induration or fluctuance, calf diameter increased on the right, Homans equivocal   Neurological:      Mental Status: She is alert and oriented to person, place, and time.      Coordination: Coordination normal.      Gait: Gait normal.         Data / Lab Results:    Hemoglobin A1C   Date Value Ref Range Status   05/08/2024 6.5 (A) 4.5 - 5.7 % Final   01/24/2024 6.9 (A) 4.5 - 5.7 % Final   10/18/2023 6.4 (A) 4.5 - 5.7 % Final        Lab Results   Component Value Date    LDL 84 10/02/2023    LDL 73 09/06/2022    LDL 62 05/02/2019     Lab Results   Component Value Date    CHOL 129 05/02/2019    CHOL 136 11/01/2016     Lab Results   Component Value Date    TRIG 97 10/02/2023    TRIG 79 09/06/2022    TRIG 98 05/02/2019     Lab Results   Component Value Date    HDL 51 10/02/2023    HDL 51 09/06/2022    HDL 49 (L) 05/02/2019     No results found for: \"PSA\"  Lab Results   Component Value Date    WBC 7.1 07/17/2024    HGB 15.0 07/17/2024    HCT 47.1 (H) 07/17/2024    MCV 93 07/17/2024     07/17/2024     Lab Results   Component Value Date    TSH 3.360 10/02/2023      Lab Results   Component Value Date    GLUCOSE 105 (H) 07/17/2024    BUN 11 07/17/2024    CREATININE 0.55 (L) 07/17/2024    EGFRIFNONA 108 05/02/2019    EGFRIFAFRI 126 05/02/2019    BCR 20 07/17/2024    K 4.3 07/17/2024    CO2 25 07/17/2024    CALCIUM 9.5 07/17/2024    PROTENTOTREF 6.7 07/17/2024    ALBUMIN 4.1 07/17/2024    LABIL2 1.4 10/02/2023    AST 25 07/17/2024    ALT 32 07/17/2024     No results found for: \"FARHANA\", \"RF\", \"SEDRATE\"   Lab Results   Component Value Date    CRP 1.74 (H) 08/08/2023      No results found for: \"IRON\", \"TIBC\", \"FERRITIN\"   No results found for: \"SRIXYDVR88\"       Assessment & Plan      Medications        Problem List         LOS    Health maintenance.  Doing well, vaccines current.  pneumonia vaccine sched.  Discussed calcium vitamin D.  D iscussed coated baby aspirin daily.  Discussed health maintenance, screening test last " modification.  Mammogram benign 2020, 2022, 2023.  Pap/H PV neg 2022.  Type II diabetes.  New onset improved, A1c to 6.5, tolerating increased dose ozempic, and then.   Discussed diet, exercise, lifestyle modification, weight loss.  Monitor BS at home .  Follow up recheck. Plan add lisinipril.  Elevated LFTs.  Improved/resolved on recheck.  Mild, new onset.  DDx includes fatty liver add fish oil.  Follow-up recheck.  Consider further eval persistent elevation.  Left leg pain evaluated with numbness/tingling.    Improved today, x-ray with mild signs of lumbar degeneration, continue as needed nighttime muscle relaxant, rehabilitation exercise discussed, nightly amitriptyline.  Follow-up recheck.  Add as needed meloxicam.  Lower extremity edema.    Recurrence today, restart furosemide/potassium.  DDx includes venous insufficiency, sleep apnea.  Echo benign 2019.  Start Lasix/potassium/JOAQUIN hose.  Discussed low-sodium diet.  Recommend sleep eval she declines currently but states she will consider.  Follow-up recheck.  Consider further eval if persistent symptoms.  Right lower extremity cellulitis.  Start antibiotics.  DDx includes DVT check ultrasound.  Follow-up recheck.  Return if fever or worsening symptoms.  Allergic rhinitis.  Over-the-counter Claritin or Zyrtec.  Umbilical hernia.  Stable today.  Followed by Dr. Diop, working on weight loss, monitoring. S/s incarceration discussed.  2.6 cm per CT scan 6/23.  Obesity.  Improved, working on diet and exercise, has lost weight.  Recommend bariatric surgery, sleep study she declines.  Add ozempic.   Abnormal stress test.  Stress Cardiolite with abnormal area, subsequent echo normal.  Followed by cardiology.  Exercise has been recommended.  Thoracic back pain.  Strain.  Benign exam today.  Start as needed meloxicam.  Rehabilitation exercises discussed.  Add PRN muscle relaxer.  Follow-up recheck.  Consider imaging if persistent symptoms.  Osteoarthritis knees.  Improved  today s/p cartilage ijxn, followed by ortho / follow up schef.  Improved on meloxicam as needed, improved status post recent injection.  Still with pain limiting exercise check x-rays, start PT.  Follow-up recheck.  Consider orthopedics referral, repeat injection if persistent symptoms..  symptoms worse on right.  Ice, rehabilitation exercise discussed.  Call return if worsening symptoms.  Colon cancer screening.  Recommend colonoscopy/Cologuard, Cologuard scheduled.  Shortness of breath.  Multifactorial, likely secondary to deconditioning.  Has had cardiology eval.  DDx includes COPD add inhaler.  Increase exercise.  Consider PFTs.  Follow-up recheck.  Cellulitis abdomen.  Improved today has completed course antibiotics.  Overlying hernia, complicating abrasion. Improving today, continue antibiotics/ change to bactrim.  topical care discussed.   Call / return if worsening symptoms.   Viral conjunctivitis.  Benign exam today, start antibiotics.   Call or return if worsening or persistent symptoms.  Blood in stool.  Overall benign exam today, likely secondary to hemorrhoids.  Increase fluids/fiber.  Topical care discussed.  Check hemoglobin.  Cologuard rescheduled.  Call return if worsening symptoms.  Facial numbness/transient weakness.  Clinically improved today, status post eval per ED with benign CT brain, has had benign Optho eval per her report, will get records.  DDx includes TIA, neuropathy, She stopped coated baby aspirin daily, restart, MRI brain benign, EKG benign today, Holter with short vruns onky max 7 beats, carotid Doppler With mild bilateral blockage only, echocardiogram benign /bubble study negative.  Close follow-up scheduled.  Call return if recurrent symptoms.  Constipation.  Increase fluids/fiber.  Add fiber/stool softeners.  Call return if worsening symptoms.      Diagnoses and all orders for this visit:    1. Pain in both lower extremities (Primary)    2. Redness and swelling of lower leg  -      Discontinue: doxycycline (MONODOX) 100 MG capsule; Take 1 capsule by mouth 2 (Two) Times a Day. (Patient not taking: Reported on 7/17/2024)  Dispense: 30 capsule; Refill: 0  -     US Venous Doppler Lower Extremity Right (duplex)  -     CBC & Differential  -     Basic Metabolic Panel    3. Leg swelling  -     US Venous Doppler Lower Extremity Right (duplex)  -     furosemide (LASIX) 40 MG tablet; Take 1 tablet by mouth Daily As Needed (swelling). (Patient not taking: Reported on 7/17/2024)  Dispense: 30 tablet; Refill: 2  -     potassium chloride (KLOR-CON M20) 20 MEQ CR tablet; Take 1 tablet by mouth Daily.  Dispense: 30 tablet; Refill: 2    4. Itching    5. Class 3 severe obesity due to excess calories with serious comorbidity and body mass index (BMI) of 50.0 to 59.9 in adult    6. Acute cystitis with hematuria  -     POCT urinalysis dipstick, manual    7. Dysuria  -     POCT urinalysis dipstick, manual    8. Type 2 diabetes mellitus with hyperglycemia, with long-term current use of insulin  -     POCT Glucose    9. Cellulitis of lower extremity, unspecified laterality  -     Discontinue: doxycycline (MONODOX) 100 MG capsule; Take 1 capsule by mouth 2 (Two) Times a Day. (Patient not taking: Reported on 7/17/2024)  Dispense: 30 capsule; Refill: 0    10. Hypertension, unspecified type  -     CBC & Differential  -     Basic Metabolic Panel    11. Left ankle swelling  -     furosemide (LASIX) 40 MG tablet; Take 1 tablet by mouth Daily As Needed (swelling). (Patient not taking: Reported on 7/17/2024)  Dispense: 30 tablet; Refill: 2        Class 3 Severe Obesity (BMI >=40). Obesity-related health conditions include the following: diabetes mellitus and dyslipidemias. Obesity is unchanged. BMI is is above average; BMI management plan is completed. We discussed portion control and increasing exercise.        Expected course, medications, and adverse effects discussed.  Call or return if worsening or persistent symptoms.   I wore protective equipment throughout this patient encounter including a mask, gloves, and eye protection.  Hand hygiene was performed before donning protective equipment and after removal when leaving the room. The complete contents of the Assessment and Plan and Data/Lab Results as documented above have been reviewed and addressed by myself with the patient today as part of an ongoing evaluation / treatment plan.  If some of the documentation has been copied from a previous note and is unchanged it indicates that this problem / plan has been assessed today but is stable from a previous visit and no changes have been recommended.

## 2024-06-26 ENCOUNTER — HOSPITAL ENCOUNTER (OUTPATIENT)
Dept: ULTRASOUND IMAGING | Facility: HOSPITAL | Age: 59
Discharge: HOME | End: 2024-06-26
Admitting: FAMILY MEDICINE
Payer: MEDICAID

## 2024-06-26 ENCOUNTER — TELEPHONE (OUTPATIENT)
Dept: FAMILY MEDICINE CLINIC | Facility: CLINIC | Age: 59
End: 2024-06-26

## 2024-06-26 ENCOUNTER — OFFICE VISIT (OUTPATIENT)
Dept: FAMILY MEDICINE CLINIC | Facility: CLINIC | Age: 59
End: 2024-06-26
Payer: MEDICAID

## 2024-06-26 VITALS
BODY MASS INDEX: 48.55 KG/M2 | RESPIRATION RATE: 18 BRPM | SYSTOLIC BLOOD PRESSURE: 126 MMHG | OXYGEN SATURATION: 96 % | TEMPERATURE: 98 F | WEIGHT: 291.4 LBS | DIASTOLIC BLOOD PRESSURE: 82 MMHG | HEIGHT: 65 IN | HEART RATE: 107 BPM

## 2024-06-26 DIAGNOSIS — M79.605 PAIN IN BOTH LOWER EXTREMITIES: Primary | ICD-10-CM

## 2024-06-26 DIAGNOSIS — I10 HYPERTENSION, UNSPECIFIED TYPE: ICD-10-CM

## 2024-06-26 DIAGNOSIS — M79.604 PAIN IN BOTH LOWER EXTREMITIES: Primary | ICD-10-CM

## 2024-06-26 DIAGNOSIS — Z79.4 TYPE 2 DIABETES MELLITUS WITH HYPERGLYCEMIA, WITH LONG-TERM CURRENT USE OF INSULIN: ICD-10-CM

## 2024-06-26 DIAGNOSIS — E11.65 TYPE 2 DIABETES MELLITUS WITH HYPERGLYCEMIA, WITH LONG-TERM CURRENT USE OF INSULIN: ICD-10-CM

## 2024-06-26 DIAGNOSIS — N30.01 ACUTE CYSTITIS WITH HEMATURIA: ICD-10-CM

## 2024-06-26 DIAGNOSIS — L29.9 ITCHING: ICD-10-CM

## 2024-06-26 DIAGNOSIS — M79.89 REDNESS AND SWELLING OF LOWER LEG: ICD-10-CM

## 2024-06-26 DIAGNOSIS — E66.01 CLASS 3 SEVERE OBESITY DUE TO EXCESS CALORIES WITH SERIOUS COMORBIDITY AND BODY MASS INDEX (BMI) OF 50.0 TO 59.9 IN ADULT: ICD-10-CM

## 2024-06-26 DIAGNOSIS — L03.119 CELLULITIS OF LOWER EXTREMITY, UNSPECIFIED LATERALITY: ICD-10-CM

## 2024-06-26 DIAGNOSIS — R23.8 REDNESS AND SWELLING OF LOWER LEG: ICD-10-CM

## 2024-06-26 DIAGNOSIS — M79.89 LEG SWELLING: ICD-10-CM

## 2024-06-26 DIAGNOSIS — M25.472 LEFT ANKLE SWELLING: ICD-10-CM

## 2024-06-26 DIAGNOSIS — R30.0 DYSURIA: ICD-10-CM

## 2024-06-26 LAB
BILIRUB BLD-MCNC: NEGATIVE MG/DL
CLARITY, POC: CLEAR
COLOR UR: YELLOW
GLUCOSE BLDC GLUCOMTR-MCNC: 112 MG/DL (ref 70–130)
GLUCOSE UR STRIP-MCNC: NEGATIVE MG/DL
KETONES UR QL: NEGATIVE
LEUKOCYTE EST, POC: NEGATIVE
NITRITE UR-MCNC: NEGATIVE MG/ML
PH UR: 6.5 [PH] (ref 5–8)
PROT UR STRIP-MCNC: NEGATIVE MG/DL
RBC # UR STRIP: NEGATIVE /UL
SP GR UR: 1.02 (ref 1–1.03)
UROBILINOGEN UR QL: NORMAL

## 2024-06-26 PROCEDURE — 82948 REAGENT STRIP/BLOOD GLUCOSE: CPT | Performed by: FAMILY MEDICINE

## 2024-06-26 PROCEDURE — 3044F HG A1C LEVEL LT 7.0%: CPT | Performed by: FAMILY MEDICINE

## 2024-06-26 PROCEDURE — 99214 OFFICE O/P EST MOD 30 MIN: CPT | Performed by: FAMILY MEDICINE

## 2024-06-26 PROCEDURE — 93971 EXTREMITY STUDY: CPT

## 2024-06-26 PROCEDURE — 1125F AMNT PAIN NOTED PAIN PRSNT: CPT | Performed by: FAMILY MEDICINE

## 2024-06-26 RX ORDER — POTASSIUM CHLORIDE 20 MEQ/1
20 TABLET, EXTENDED RELEASE ORAL DAILY
Qty: 30 TABLET | Refills: 2 | Status: SHIPPED | OUTPATIENT
Start: 2024-06-26

## 2024-06-26 RX ORDER — DOXYCYCLINE 100 MG/1
100 CAPSULE ORAL 2 TIMES DAILY
Qty: 30 CAPSULE | Refills: 0 | Status: SHIPPED | OUTPATIENT
Start: 2024-06-26 | End: 2024-07-17

## 2024-06-26 RX ORDER — FUROSEMIDE 40 MG/1
40 TABLET ORAL DAILY PRN
Qty: 30 TABLET | Refills: 2 | Status: SHIPPED | OUTPATIENT
Start: 2024-06-26

## 2024-06-26 NOTE — TELEPHONE ENCOUNTER
Let her know her ultrasound of her leg looks good, no sign of any blood clots, continue plan, thanks

## 2024-07-10 NOTE — PROGRESS NOTES
Subjective   Candice Swan is a 59 y.o. female.     Chief Complaint   Patient presents with    Leg Pain     Bilateral, but Left is worse       Leg Pain   The incident occurred more than 1 week ago. There was no injury mechanism. The pain is present in the left leg, left foot, right foot and right leg. The quality of the pain is described as burning and stabbing. The pain is at a severity of 6/10. The pain is moderate. The pain has been Worsening since onset. Associated symptoms include numbness and tingling. She reports no foreign bodies present. The symptoms are aggravated by movement and weight bearing.   Rash  This is a new problem. The current episode started 1 to 4 weeks ago. The problem has been worse since onset. The affected locations include the left lower leg, left foot, right lower leg and right foot. The rash is characterized by itchiness, redness, swelling, burning and pain. She was exposed to nothing. Pertinent negatives include no fatigue or shortness of breath. Past treatments include nothing.            I personally reviewed and updated the patient's allergies, medications, problem list, and past medical, surgical, social, and family history. I have reviewed and confirmed the accuracy of the History of Present Illness and Review of Symptoms as documented by the MA/LPN/RN. Brandin Sunshine MD    Family History   Problem Relation Age of Onset    Diabetes Mother     Heart disease Mother        Social History     Tobacco Use    Smoking status: Never     Passive exposure: Never    Smokeless tobacco: Never   Vaping Use    Vaping status: Never Used   Substance Use Topics    Alcohol use: No    Drug use: No       Past Surgical History:   Procedure Laterality Date    CARDIOVASCULAR STRESS TEST  06/11/2019    abnormal    CHOLECYSTECTOMY      ECHO - CONVERTED  2019       Patient Active Problem List   Diagnosis    Degeneration of intervertebral disc at C5-C6 level    Numbness and tingling in left arm    Umbilical  hernia    Osteoarthritis of both knees    Class 3 severe obesity due to excess calories with serious comorbidity and body mass index (BMI) of 50.0 to 59.9 in adult    Abnormal nuclear stress test    Fatigue    Allergic rhinitis    Fatty liver    Mixed hyperlipidemia    Empty sella    Cervical disc disease    Lipoma    GERD (gastroesophageal reflux disease)    Spasm eyelid    SOB (shortness of breath)    Dysuria    Annual visit for general adult medical examination with abnormal findings    Screening for malignant neoplasm of breast    Screening for malignant neoplasm of cervix    Acute bacterial sinusitis    Seasonal allergic rhinitis due to pollen    Acute pain of left knee    Neuropathy    Screen for colon cancer    Acute left-sided thoracic back pain    TIA (transient ischemic attack)    Type 2 diabetes mellitus with neurologic complication, without long-term current use of insulin    Cellulitis of right lower extremity    ELKIN (obstructive sleep apnea)         Current Outpatient Medications:     Accu-Chek Guide test strip, USE TEST STRIPS TO TEST GLUCOSE LEVELS TWICE DAILY, Disp: , Rfl:     Accu-Chek Softclix Lancets lancets, USE LANCETS TO TEST GLUCOSE TWICE DAILY, Disp: , Rfl:     ASPIRIN 81 PO, Take  by mouth., Disp: , Rfl:     Blood Glucose Monitoring Suppl (Accu-Chek Guide) w/Device kit, USE TO TEST BLOOD SUGARS TWICE DAILY, Disp: , Rfl:     docusate sodium (COLACE) 100 MG capsule, Take 1 capsule by mouth 2 (Two) Times a Day., Disp: , Rfl:     potassium chloride (KLOR-CON M20) 20 MEQ CR tablet, Take 1 tablet by mouth Daily., Disp: 30 tablet, Rfl: 2    silver sulfadiazine (SILVADENE, SSD) 1 % cream, Apply 1 Application topically to the appropriate area as directed 2 (Two) Times a Day., Disp: 400 g, Rfl: 0    furosemide (LASIX) 40 MG tablet, Take 1 tablet by mouth Daily As Needed (swelling). (Patient not taking: Reported on 7/17/2024), Disp: 30 tablet, Rfl: 2    gabapentin (NEURONTIN) 100 MG capsule, Take 1  "capsule by mouth 2 (Two) Times a Day As Needed (pain)., Disp: 60 capsule, Rfl: 2    hydrocortisone (ANUSOL-HC) 25 MG suppository, Insert 1 suppository into the rectum 2 (Two) Times a Day. (Patient not taking: Reported on 5/8/2024), Disp: 24 each, Rfl: 5    meloxicam (MOBIC) 15 MG tablet, Take 1 tablet by mouth Daily As Needed for Moderate Pain. (Patient not taking: Reported on 6/26/2024), Disp: 90 tablet, Rfl: 1    polycarbophil (calcium polycarbophil) 625 MG tablet tablet, Take 1 tablet by mouth Daily. (Patient not taking: Reported on 7/17/2024), Disp: , Rfl:     Semaglutide,0.25 or 0.5MG/DOS, (Ozempic, 0.25 or 0.5 MG/DOSE,) 2 MG/3ML solution pen-injector, Inject 0.5 mg under the skin into the appropriate area as directed 1 (One) Time Per Week., Disp: 3 mL, Rfl: 3         Review of Systems   Constitutional:  Negative for chills, diaphoresis and fatigue.   Eyes:  Negative for visual disturbance.   Respiratory:  Negative for shortness of breath.    Cardiovascular:  Negative for chest pain and palpitations.   Gastrointestinal:  Negative for abdominal pain and nausea.   Endocrine: Negative for polydipsia and polyphagia.   Genitourinary:  Positive for frequency and hematuria. Negative for flank pain.   Musculoskeletal:  Negative for neck stiffness.   Skin:  Positive for rash. Negative for color change and pallor.   Neurological:  Positive for tingling and numbness. Negative for seizures and syncope.   Hematological:  Negative for adenopathy.       I have reviewed and confirmed the accuracy of the ROS as documented by the MA/LPN/RN Brandin Sunshine MD      Objective   /80 (BP Location: Right arm, Patient Position: Sitting, Cuff Size: Adult)   Pulse 101   Temp 98 °F (36.7 °C) (Temporal)   Resp 18   Ht 165.1 cm (65\")   Wt 132 kg (290 lb 9.6 oz)   LMP 08/01/2019   SpO2 96%   BMI 48.36 kg/m²   BP Readings from Last 3 Encounters:   07/17/24 124/80   06/26/24 126/82   06/05/24 114/78     Wt Readings from Last 3 " Encounters:   07/17/24 132 kg (290 lb 9.6 oz)   06/26/24 132 kg (291 lb 6.4 oz)   06/05/24 133 kg (293 lb 6.4 oz)     Physical Exam  Constitutional:       Appearance: Normal appearance. She is well-developed. She is not diaphoretic.   Eyes:      General: Lids are normal.      Extraocular Movements:      Right eye: No nystagmus.      Left eye: No nystagmus.      Conjunctiva/sclera: Conjunctivae normal.      Right eye: Right conjunctiva is not injected. No hemorrhage.     Left eye: Left conjunctiva is not injected. No hemorrhage.     Pupils: Pupils are equal, round, and reactive to light.      Funduscopic exam:     Right eye: No hemorrhage, exudate, AV nicking, arteriolar narrowing or papilledema.         Left eye: No hemorrhage, exudate, AV nicking, arteriolar narrowing or papilledema.   Cardiovascular:      Rate and Rhythm: Normal rate and regular rhythm.      Pulses: Normal pulses.      Heart sounds: Normal heart sounds, S1 normal and S2 normal. No murmur heard.     No friction rub. No gallop.   Pulmonary:      Effort: Pulmonary effort is normal. No accessory muscle usage.      Breath sounds: Normal breath sounds. No stridor. No decreased breath sounds, wheezing, rhonchi or rales.   Abdominal:      General: Bowel sounds are normal. There is no distension.      Palpations: Abdomen is soft. Abdomen is not rigid. There is no mass or pulsatile mass.      Tenderness: There is no abdominal tenderness. There is no guarding or rebound. Negative signs include Aceves's sign.      Hernia: No hernia is present.   Skin:     General: Skin is warm and dry.      Coloration: Skin is not pale.   Neurological:      Mental Status: She is alert and oriented to person, place, and time.      Coordination: Coordination normal.      Gait: Gait normal.         Data / Lab Results:    Hemoglobin A1C   Date Value Ref Range Status   05/08/2024 6.5 (A) 4.5 - 5.7 % Final   01/24/2024 6.9 (A) 4.5 - 5.7 % Final   10/18/2023 6.4 (A) 4.5 - 5.7 %  "Final        Lab Results   Component Value Date    LDL 84 10/02/2023    LDL 73 09/06/2022    LDL 62 05/02/2019     Lab Results   Component Value Date    CHOL 129 05/02/2019    CHOL 136 11/01/2016     Lab Results   Component Value Date    TRIG 97 10/02/2023    TRIG 79 09/06/2022    TRIG 98 05/02/2019     Lab Results   Component Value Date    HDL 51 10/02/2023    HDL 51 09/06/2022    HDL 49 (L) 05/02/2019     No results found for: \"PSA\"  Lab Results   Component Value Date    WBC 7.1 07/17/2024    HGB 15.0 07/17/2024    HCT 47.1 (H) 07/17/2024    MCV 93 07/17/2024     07/17/2024     Lab Results   Component Value Date    TSH 3.360 10/02/2023      Lab Results   Component Value Date    GLUCOSE 105 (H) 07/17/2024    BUN 11 07/17/2024    CREATININE 0.55 (L) 07/17/2024    EGFRIFNONA 108 05/02/2019    EGFRIFAFRI 126 05/02/2019    BCR 20 07/17/2024    K 4.3 07/17/2024    CO2 25 07/17/2024    CALCIUM 9.5 07/17/2024    PROTENTOTREF 6.7 07/17/2024    ALBUMIN 4.1 07/17/2024    LABIL2 1.4 10/02/2023    AST 25 07/17/2024    ALT 32 07/17/2024     No results found for: \"FARHANA\", \"RF\", \"SEDRATE\"   Lab Results   Component Value Date    CRP 1.74 (H) 08/08/2023      No results found for: \"IRON\", \"TIBC\", \"FERRITIN\"   No results found for: \"AIZLOLXX90\"       Assessment & Plan      Medications        Problem List         LOS    Health maintenance.  Doing well, vaccines current.  pneumonia vaccine sched.  Discussed calcium vitamin D.  D iscussed coated baby aspirin daily.  Discussed health maintenance, screening test last modification.  Mammogram benign 2020, 2022, 2023.  Pap/H PV neg 2022.  Type II diabetes.  New onset improved, A1c to 6.5, tolerating increased dose ozempic, and then.   Discussed diet, exercise, lifestyle modification, weight loss.  Monitor BS at home .  Follow up recheck. Plan add lisinipril.  With neuropathy add gabapentin.    Elevated LFTs.  Improved/resolved on recheck.  Mild, new onset.  DDx includes fatty liver add " fish oil.  Follow-up recheck.  Consider further eval persistent elevation.  Left leg pain evaluated with numbness/tingling.    Improved today, x-ray with mild signs of lumbar degeneration, continue as needed nighttime muscle relaxant, rehabilitation exercise discussed, nightly amitriptyline.  Follow-up recheck.  Add as needed meloxicam.  Lower extremity edema.  Improved today back on furosemide/potassium.  DDx includes venous insufficiency, sleep apnea.  Echo benign / with increased r sided pressure 2023..  JOAQUIN hose.  Discussed low-sodium diet.  Recommend sleep eval she agrees today.  Follow-up recheck.  Consider further eval if persistent symptoms.  Right lower extremity cellulitis.  Improved/resolved today, has completed course antibiotics.  DDx includes DVT ultrasound benign.  Follow-up recheck.  Return if fever or worsening symptoms.  Allergic rhinitis.  Over-the-counter Claritin or Zyrtec.  Umbilical hernia.  Stable today.  Followed by Dr. Diop, working on weight loss, monitoring. S/s incarceration discussed.  2.6 cm per CT scan 6/23.  Obesity.  Improved, working on diet and exercise, has lost weight.  Recommend bariatric surgery, sleep study she agrees to sleep study today.  Improved on ozempic.   Abnormal stress test.  Stress Cardiolite with abnormal area, subsequent echo normal.  Followed by cardiology.  Exercise has been recommended.  Thoracic back pain.  Strain.  Benign exam today.  Start as needed meloxicam.  Rehabilitation exercises discussed.  Add PRN muscle relaxer.  Follow-up recheck.  Consider imaging if persistent symptoms.  Osteoarthritis knees.  Improved today s/p cartilage ijxn, followed by ortho / follow up schef.  Improved on meloxicam as needed, improved status post recent injection.  Still with pain limiting exercise check x-rays, start PT.  Follow-up recheck.  Consider orthopedics referral, repeat injection if persistent symptoms..  symptoms worse on right.  Ice, rehabilitation exercise  discussed.  Call return if worsening symptoms.  Colon cancer screening.  Recommend colonoscopy/Cologuard, Cologuard scheduled.  Shortness of breath.  Multifactorial, likely secondary to deconditioning.  Has had cardiology eval.  DDx includes COPD add inhaler.  Increase exercise.  Consider PFTs.  Follow-up recheck.  Cellulitis abdomen.  Improved today has completed course antibiotics.  Overlying hernia, complicating abrasion. Improving today, continue antibiotics/ change to bactrim.  topical care discussed.   Call / return if worsening symptoms.   Viral conjunctivitis.  Benign exam today, start antibiotics.   Call or return if worsening or persistent symptoms.  Blood in stool.  Overall benign exam today, likely secondary to hemorrhoids.  Increase fluids/fiber.  Topical care discussed.  Check hemoglobin.  Cologuard rescheduled.  Call return if worsening symptoms.  Facial numbness/transient weakness.  Clinically improved today, status post eval per ED with benign CT brain, has had benign Optho eval per her report, will get records.  DDx includes TIA, neuropathy, She stopped coated baby aspirin daily, restart, MRI brain benign, EKG benign today, Holter with short vruns onky max 7 beats, carotid Doppler With mild bilateral blockage only, echocardiogram benign /bubble study negative.  Close follow-up scheduled.  Call return if recurrent symptoms.  Constipation.  Increase fluids/fiber.  Add fiber/stool softeners.  Call return if worsening symptoms.        Diagnoses and all orders for this visit:    1. Pain in both lower extremities (Primary)  -     CBC & Differential  -     Comprehensive Metabolic Panel  -     proBNP  -     gabapentin (NEURONTIN) 100 MG capsule; Take 1 capsule by mouth 2 (Two) Times a Day As Needed (pain).  Dispense: 60 capsule; Refill: 2    2. Redness and swelling of lower leg  -     CBC & Differential  -     Comprehensive Metabolic Panel  -     proBNP    3. Class 3 severe obesity due to excess calories  with serious comorbidity and body mass index (BMI) of 45.0 to 49.9 in adult    4. Umbilical hernia with obstruction, without gangrene  -     silver sulfadiazine (SILVADENE, SSD) 1 % cream; Apply 1 Application topically to the appropriate area as directed 2 (Two) Times a Day.  Dispense: 400 g; Refill: 0    5. Type 2 diabetes mellitus with diabetic polyneuropathy, without long-term current use of insulin  -     Semaglutide,0.25 or 0.5MG/DOS, (Ozempic, 0.25 or 0.5 MG/DOSE,) 2 MG/3ML solution pen-injector; Inject 0.5 mg under the skin into the appropriate area as directed 1 (One) Time Per Week.  Dispense: 3 mL; Refill: 3    6. Sleep disturbance  -     Ambulatory Referral to Neurology    7. Other fatigue  -     Ambulatory Referral to Neurology    8. Mixed hyperlipidemia    9. Cellulitis of right lower extremity    10. ELKIN (obstructive sleep apnea)      Class 3 Severe Obesity (BMI >=40). Obesity-related health conditions include the following: diabetes mellitus. Obesity is improving with treatment. BMI is is above average; BMI management plan is completed. We discussed portion control and increasing exercise.          Expected course, medications, and adverse effects discussed.  Call or return if worsening or persistent symptoms.  I wore protective equipment throughout this patient encounter including a mask, gloves, and eye protection.  Hand hygiene was performed before donning protective equipment and after removal when leaving the room. The complete contents of the Assessment and Plan and Data/Lab Results as documented above have been reviewed and addressed by myself with the patient today as part of an ongoing evaluation / treatment plan.  If some of the documentation has been copied from a previous note and is unchanged it indicates that this problem / plan has been assessed today but is stable from a previous visit and no changes have been recommended.

## 2024-07-17 ENCOUNTER — OFFICE VISIT (OUTPATIENT)
Dept: FAMILY MEDICINE CLINIC | Facility: CLINIC | Age: 59
End: 2024-07-17
Payer: MEDICAID

## 2024-07-17 VITALS
OXYGEN SATURATION: 96 % | WEIGHT: 290.6 LBS | HEIGHT: 65 IN | BODY MASS INDEX: 48.42 KG/M2 | TEMPERATURE: 98 F | DIASTOLIC BLOOD PRESSURE: 80 MMHG | RESPIRATION RATE: 18 BRPM | HEART RATE: 101 BPM | SYSTOLIC BLOOD PRESSURE: 124 MMHG

## 2024-07-17 DIAGNOSIS — K42.0 UMBILICAL HERNIA WITH OBSTRUCTION, WITHOUT GANGRENE: ICD-10-CM

## 2024-07-17 DIAGNOSIS — M79.605 PAIN IN BOTH LOWER EXTREMITIES: Primary | ICD-10-CM

## 2024-07-17 DIAGNOSIS — M79.604 PAIN IN BOTH LOWER EXTREMITIES: Primary | ICD-10-CM

## 2024-07-17 DIAGNOSIS — R23.8 REDNESS AND SWELLING OF LOWER LEG: ICD-10-CM

## 2024-07-17 DIAGNOSIS — G47.9 SLEEP DISTURBANCE: ICD-10-CM

## 2024-07-17 DIAGNOSIS — E78.2 MIXED HYPERLIPIDEMIA: ICD-10-CM

## 2024-07-17 DIAGNOSIS — E11.42 TYPE 2 DIABETES MELLITUS WITH DIABETIC POLYNEUROPATHY, WITHOUT LONG-TERM CURRENT USE OF INSULIN: ICD-10-CM

## 2024-07-17 DIAGNOSIS — E66.01 CLASS 3 SEVERE OBESITY DUE TO EXCESS CALORIES WITH SERIOUS COMORBIDITY AND BODY MASS INDEX (BMI) OF 45.0 TO 49.9 IN ADULT: ICD-10-CM

## 2024-07-17 DIAGNOSIS — L03.115 CELLULITIS OF RIGHT LOWER EXTREMITY: ICD-10-CM

## 2024-07-17 DIAGNOSIS — R53.83 OTHER FATIGUE: ICD-10-CM

## 2024-07-17 DIAGNOSIS — M79.89 REDNESS AND SWELLING OF LOWER LEG: ICD-10-CM

## 2024-07-17 DIAGNOSIS — G47.33 OSA (OBSTRUCTIVE SLEEP APNEA): ICD-10-CM

## 2024-07-17 PROCEDURE — 1125F AMNT PAIN NOTED PAIN PRSNT: CPT | Performed by: FAMILY MEDICINE

## 2024-07-17 PROCEDURE — 99214 OFFICE O/P EST MOD 30 MIN: CPT | Performed by: FAMILY MEDICINE

## 2024-07-17 PROCEDURE — 3044F HG A1C LEVEL LT 7.0%: CPT | Performed by: FAMILY MEDICINE

## 2024-07-17 RX ORDER — SEMAGLUTIDE 0.68 MG/ML
0.5 INJECTION, SOLUTION SUBCUTANEOUS WEEKLY
Qty: 3 ML | Refills: 3 | Status: SHIPPED | OUTPATIENT
Start: 2024-07-17

## 2024-07-17 RX ORDER — GABAPENTIN 100 MG/1
100 CAPSULE ORAL 2 TIMES DAILY PRN
Qty: 60 CAPSULE | Refills: 2 | Status: SHIPPED | OUTPATIENT
Start: 2024-07-17

## 2024-07-18 LAB
ALBUMIN SERPL-MCNC: 4.1 G/DL (ref 3.8–4.9)
ALP SERPL-CCNC: 109 IU/L (ref 44–121)
ALT SERPL-CCNC: 32 IU/L (ref 0–32)
AST SERPL-CCNC: 25 IU/L (ref 0–40)
BASOPHILS # BLD AUTO: 0 X10E3/UL (ref 0–0.2)
BASOPHILS NFR BLD AUTO: 0 %
BILIRUB SERPL-MCNC: 0.5 MG/DL (ref 0–1.2)
BUN SERPL-MCNC: 11 MG/DL (ref 6–24)
BUN/CREAT SERPL: 20 (ref 9–23)
CALCIUM SERPL-MCNC: 9.5 MG/DL (ref 8.7–10.2)
CHLORIDE SERPL-SCNC: 102 MMOL/L (ref 96–106)
CO2 SERPL-SCNC: 25 MMOL/L (ref 20–29)
CREAT SERPL-MCNC: 0.55 MG/DL (ref 0.57–1)
EGFRCR SERPLBLD CKD-EPI 2021: 106 ML/MIN/1.73
EOSINOPHIL # BLD AUTO: 0.2 X10E3/UL (ref 0–0.4)
EOSINOPHIL NFR BLD AUTO: 3 %
ERYTHROCYTE [DISTWIDTH] IN BLOOD BY AUTOMATED COUNT: 13 % (ref 11.7–15.4)
GLOBULIN SER CALC-MCNC: 2.6 G/DL (ref 1.5–4.5)
GLUCOSE SERPL-MCNC: 105 MG/DL (ref 70–99)
HCT VFR BLD AUTO: 47.1 % (ref 34–46.6)
HGB BLD-MCNC: 15 G/DL (ref 11.1–15.9)
IMM GRANULOCYTES # BLD AUTO: 0 X10E3/UL (ref 0–0.1)
IMM GRANULOCYTES NFR BLD AUTO: 0 %
LYMPHOCYTES # BLD AUTO: 2.1 X10E3/UL (ref 0.7–3.1)
LYMPHOCYTES NFR BLD AUTO: 30 %
MCH RBC QN AUTO: 29.7 PG (ref 26.6–33)
MCHC RBC AUTO-ENTMCNC: 31.8 G/DL (ref 31.5–35.7)
MCV RBC AUTO: 93 FL (ref 79–97)
MONOCYTES # BLD AUTO: 0.5 X10E3/UL (ref 0.1–0.9)
MONOCYTES NFR BLD AUTO: 8 %
NEUTROPHILS # BLD AUTO: 4.2 X10E3/UL (ref 1.4–7)
NEUTROPHILS NFR BLD AUTO: 59 %
NT-PROBNP SERPL-MCNC: 43 PG/ML (ref 0–287)
PLATELET # BLD AUTO: 266 X10E3/UL (ref 150–450)
POTASSIUM SERPL-SCNC: 4.3 MMOL/L (ref 3.5–5.2)
PROT SERPL-MCNC: 6.7 G/DL (ref 6–8.5)
RBC # BLD AUTO: 5.05 X10E6/UL (ref 3.77–5.28)
SODIUM SERPL-SCNC: 142 MMOL/L (ref 134–144)
WBC # BLD AUTO: 7.1 X10E3/UL (ref 3.4–10.8)

## 2024-07-29 ENCOUNTER — TELEPHONE (OUTPATIENT)
Dept: FAMILY MEDICINE CLINIC | Facility: CLINIC | Age: 59
End: 2024-07-29
Payer: MEDICAID

## 2024-07-29 DIAGNOSIS — M17.0 PRIMARY OSTEOARTHRITIS OF BOTH KNEES: Primary | ICD-10-CM

## 2024-07-29 NOTE — TELEPHONE ENCOUNTER
"Bailey sent a mychart message through her own [employer] again on suzanne leon the following message:  \"Randi epps’s fue a julianne mariaa ase 2 semanas y le dijeron q la nydia a eusebia con la especialista de las rodillas para que le dieran unas inyecciones. Y emos estado llamando y dicen que están esperando la autorización del doctor. Downing fecha de nacimiento es 1965\"    Google translate:  \"Suzanne Epps's went to an appointment 2 weeks ago and was told that they were going to send her to the knee specialist to get some injections. And we've been calling and they say they're waiting for the doctor's clearance. Her YOB: 1965\"    I have sent them a message stating the following:  \"I see she did see  two weeks ago. Did she see a knee specialist? Who is waiting on clearance?\"    Bailey sent back the following according to google translate:  \"I spoke here where I was going before and they say they need their doctor's authorization because they change insurance\"   I called the office of the ortho on the card she sent. They are just needing a new referral faxed over since she has switched to [facility]. I am sending this today  "

## 2024-07-30 PROBLEM — L03.115 CELLULITIS OF RIGHT LOWER EXTREMITY: Status: ACTIVE | Noted: 2024-07-30

## 2024-07-30 PROBLEM — G47.33 OSA (OBSTRUCTIVE SLEEP APNEA): Status: ACTIVE | Noted: 2024-07-30

## 2024-09-10 ENCOUNTER — TELEPHONE (OUTPATIENT)
Dept: FAMILY MEDICINE CLINIC | Facility: CLINIC | Age: 59
End: 2024-09-10
Payer: MEDICAID

## 2024-09-17 ENCOUNTER — OFFICE VISIT (OUTPATIENT)
Dept: FAMILY MEDICINE CLINIC | Facility: CLINIC | Age: 59
End: 2024-09-17
Payer: MEDICAID

## 2024-09-17 ENCOUNTER — TELEPHONE (OUTPATIENT)
Dept: FAMILY MEDICINE CLINIC | Facility: CLINIC | Age: 59
End: 2024-09-17

## 2024-09-17 VITALS
BODY MASS INDEX: 46.98 KG/M2 | TEMPERATURE: 98 F | HEART RATE: 109 BPM | WEIGHT: 282 LBS | RESPIRATION RATE: 18 BRPM | OXYGEN SATURATION: 97 % | SYSTOLIC BLOOD PRESSURE: 120 MMHG | HEIGHT: 65 IN | DIASTOLIC BLOOD PRESSURE: 84 MMHG

## 2024-09-17 DIAGNOSIS — G47.33 OSA (OBSTRUCTIVE SLEEP APNEA): ICD-10-CM

## 2024-09-17 DIAGNOSIS — J06.9 ACUTE URI: ICD-10-CM

## 2024-09-17 DIAGNOSIS — K42.0 UMBILICAL HERNIA WITH OBSTRUCTION, WITHOUT GANGRENE: ICD-10-CM

## 2024-09-17 DIAGNOSIS — Z00.01 ANNUAL VISIT FOR GENERAL ADULT MEDICAL EXAMINATION WITH ABNORMAL FINDINGS: ICD-10-CM

## 2024-09-17 DIAGNOSIS — E78.2 MIXED HYPERLIPIDEMIA: ICD-10-CM

## 2024-09-17 DIAGNOSIS — E11.42 TYPE 2 DIABETES MELLITUS WITH DIABETIC POLYNEUROPATHY, WITHOUT LONG-TERM CURRENT USE OF INSULIN: Primary | ICD-10-CM

## 2024-09-17 DIAGNOSIS — E66.01 CLASS 3 SEVERE OBESITY DUE TO EXCESS CALORIES WITH SERIOUS COMORBIDITY AND BODY MASS INDEX (BMI) OF 50.0 TO 59.9 IN ADULT: ICD-10-CM

## 2024-09-17 LAB
EXPIRATION DATE: ABNORMAL
HBA1C MFR BLD: 5.8 % (ref 4.5–5.7)
Lab: ABNORMAL

## 2024-09-17 PROCEDURE — 1125F AMNT PAIN NOTED PAIN PRSNT: CPT | Performed by: FAMILY MEDICINE

## 2024-09-17 PROCEDURE — 99214 OFFICE O/P EST MOD 30 MIN: CPT | Performed by: FAMILY MEDICINE

## 2024-09-17 PROCEDURE — 3044F HG A1C LEVEL LT 7.0%: CPT | Performed by: FAMILY MEDICINE

## 2024-09-17 PROCEDURE — 83036 HEMOGLOBIN GLYCOSYLATED A1C: CPT | Performed by: FAMILY MEDICINE

## 2024-09-17 RX ORDER — SEMAGLUTIDE 1.34 MG/ML
1 INJECTION, SOLUTION SUBCUTANEOUS WEEKLY
Qty: 3 ML | Refills: 3 | Status: SHIPPED | OUTPATIENT
Start: 2024-09-17 | End: 2024-09-17

## 2024-09-17 RX ORDER — SEMAGLUTIDE 1.34 MG/ML
1 INJECTION, SOLUTION SUBCUTANEOUS WEEKLY
Qty: 3 ML | Refills: 3 | Status: SHIPPED | OUTPATIENT
Start: 2024-09-17

## 2024-09-17 RX ORDER — CEPHALEXIN 500 MG/1
500 CAPSULE ORAL 3 TIMES DAILY
Qty: 30 CAPSULE | Refills: 0 | Status: SHIPPED | OUTPATIENT
Start: 2024-09-17

## 2024-09-18 ENCOUNTER — TELEPHONE (OUTPATIENT)
Dept: FAMILY MEDICINE CLINIC | Facility: CLINIC | Age: 59
End: 2024-09-18
Payer: MEDICAID

## 2024-10-09 LAB
ALBUMIN SERPL-MCNC: 4.2 G/DL (ref 3.8–4.9)
ALP SERPL-CCNC: 135 IU/L (ref 44–121)
ALT SERPL-CCNC: 20 IU/L (ref 0–32)
AST SERPL-CCNC: 15 IU/L (ref 0–40)
BASOPHILS # BLD AUTO: 0 X10E3/UL (ref 0–0.2)
BASOPHILS NFR BLD AUTO: 0 %
BILIRUB SERPL-MCNC: 0.6 MG/DL (ref 0–1.2)
BUN SERPL-MCNC: 9 MG/DL (ref 6–24)
BUN/CREAT SERPL: 15 (ref 9–23)
CALCIUM SERPL-MCNC: 9.7 MG/DL (ref 8.7–10.2)
CHLORIDE SERPL-SCNC: 101 MMOL/L (ref 96–106)
CHOLEST SERPL-MCNC: 155 MG/DL (ref 100–199)
CHOLEST/HDLC SERPL: 2.8 RATIO (ref 0–4.4)
CO2 SERPL-SCNC: 23 MMOL/L (ref 20–29)
CREAT SERPL-MCNC: 0.62 MG/DL (ref 0.57–1)
EGFRCR SERPLBLD CKD-EPI 2021: 103 ML/MIN/1.73
EOSINOPHIL # BLD AUTO: 0.2 X10E3/UL (ref 0–0.4)
EOSINOPHIL NFR BLD AUTO: 2 %
ERYTHROCYTE [DISTWIDTH] IN BLOOD BY AUTOMATED COUNT: 13.5 % (ref 11.7–15.4)
GLOBULIN SER CALC-MCNC: 2.8 G/DL (ref 1.5–4.5)
GLUCOSE SERPL-MCNC: 108 MG/DL (ref 70–99)
HCT VFR BLD AUTO: 46.1 % (ref 34–46.6)
HDLC SERPL-MCNC: 55 MG/DL
HGB BLD-MCNC: 14.9 G/DL (ref 11.1–15.9)
IMM GRANULOCYTES # BLD AUTO: 0 X10E3/UL (ref 0–0.1)
IMM GRANULOCYTES NFR BLD AUTO: 0 %
LDLC SERPL CALC-MCNC: 81 MG/DL (ref 0–99)
LYMPHOCYTES # BLD AUTO: 2 X10E3/UL (ref 0.7–3.1)
LYMPHOCYTES NFR BLD AUTO: 27 %
MCH RBC QN AUTO: 30 PG (ref 26.6–33)
MCHC RBC AUTO-ENTMCNC: 32.3 G/DL (ref 31.5–35.7)
MCV RBC AUTO: 93 FL (ref 79–97)
MONOCYTES # BLD AUTO: 0.4 X10E3/UL (ref 0.1–0.9)
MONOCYTES NFR BLD AUTO: 6 %
NEUTROPHILS # BLD AUTO: 4.9 X10E3/UL (ref 1.4–7)
NEUTROPHILS NFR BLD AUTO: 65 %
PLATELET # BLD AUTO: 253 X10E3/UL (ref 150–450)
POTASSIUM SERPL-SCNC: 4.1 MMOL/L (ref 3.5–5.2)
PROT SERPL-MCNC: 7 G/DL (ref 6–8.5)
RBC # BLD AUTO: 4.97 X10E6/UL (ref 3.77–5.28)
SODIUM SERPL-SCNC: 140 MMOL/L (ref 134–144)
TRIGL SERPL-MCNC: 102 MG/DL (ref 0–149)
TSH SERPL DL<=0.005 MIU/L-ACNC: 3.35 UIU/ML (ref 0.45–4.5)
VLDLC SERPL CALC-MCNC: 19 MG/DL (ref 5–40)
WBC # BLD AUTO: 7.5 X10E3/UL (ref 3.4–10.8)

## 2024-11-18 NOTE — PROGRESS NOTES
Subjective   Candice Swan is a 59 y.o. female.     Chief Complaint   Patient presents with    URI       URI   This is a new problem. The current episode started in the past 7 days. The problem has been unchanged. There has been no fever. Associated symptoms include abdominal pain, congestion, coughing, headaches, rhinorrhea and sinus pain. Pertinent negatives include no chest pain, diarrhea, ear pain, nausea, sore throat or vomiting. She has tried NSAIDs for the symptoms.            I personally reviewed and updated the patient's allergies, medications, problem list, and past medical, surgical, social, and family history. I have reviewed and confirmed the accuracy of the History of Present Illness and Review of Symptoms as documented by the MA/LPN/RN. Brandin Sunshine MD    Family History   Problem Relation Age of Onset    Diabetes Mother     Heart disease Mother        Social History     Tobacco Use    Smoking status: Never     Passive exposure: Never    Smokeless tobacco: Never   Vaping Use    Vaping status: Never Used   Substance Use Topics    Alcohol use: No    Drug use: No       Past Surgical History:   Procedure Laterality Date    CARDIOVASCULAR STRESS TEST  06/11/2019    abnormal    CHOLECYSTECTOMY      ECHO - CONVERTED  2019       Patient Active Problem List   Diagnosis    Degeneration of intervertebral disc at C5-C6 level    Numbness and tingling in left arm    Umbilical hernia    Osteoarthritis of both knees    Class 3 severe obesity due to excess calories with serious comorbidity and body mass index (BMI) of 50.0 to 59.9 in adult    Abnormal nuclear stress test    Fatigue    Allergic rhinitis    Fatty liver    Mixed hyperlipidemia    Empty sella    Cervical disc disease    Lipoma    GERD (gastroesophageal reflux disease)    Spasm eyelid    SOB (shortness of breath)    Dysuria    Annual visit for general adult medical examination with abnormal findings    Screening for malignant neoplasm of breast     Screening for malignant neoplasm of cervix    Acute bacterial sinusitis    Seasonal allergic rhinitis due to pollen    Acute pain of left knee    Neuropathy    Screen for colon cancer    Acute left-sided thoracic back pain    TIA (transient ischemic attack)    Type 2 diabetes mellitus with neurologic complication, without long-term current use of insulin    Cellulitis of right lower extremity    ELKIN (obstructive sleep apnea)    Nasal lesion         Current Outpatient Medications:     Accu-Chek Guide test strip, USE TEST STRIPS TO TEST GLUCOSE LEVELS TWICE DAILY, Disp: , Rfl:     Accu-Chek Softclix Lancets lancets, USE LANCETS TO TEST GLUCOSE TWICE DAILY, Disp: , Rfl:     ASPIRIN 81 PO, Take  by mouth., Disp: , Rfl:     Blood Glucose Monitoring Suppl (Accu-Chek Guide) w/Device kit, USE TO TEST BLOOD SUGARS TWICE DAILY, Disp: , Rfl:     docusate sodium (COLACE) 100 MG capsule, Take 1 capsule by mouth 2 (Two) Times a Day., Disp: , Rfl:     furosemide (LASIX) 40 MG tablet, Take 1 tablet by mouth Daily As Needed (swelling)., Disp: 30 tablet, Rfl: 2    gabapentin (NEURONTIN) 100 MG capsule, Take 1 capsule by mouth 2 (Two) Times a Day As Needed (pain)., Disp: 60 capsule, Rfl: 2    hydrocortisone (ANUSOL-HC) 25 MG suppository, Insert 1 suppository into the rectum 2 (Two) Times a Day., Disp: 24 each, Rfl: 5    meloxicam (MOBIC) 15 MG tablet, Take 1 tablet by mouth Daily As Needed for Moderate Pain., Disp: 90 tablet, Rfl: 1    polycarbophil (calcium polycarbophil) 625 MG tablet tablet, Take 1 tablet by mouth Daily., Disp: , Rfl:     potassium chloride (KLOR-CON M20) 20 MEQ CR tablet, Take 1 tablet by mouth Daily., Disp: 30 tablet, Rfl: 2    Semaglutide, 1 MG/DOSE, (Ozempic, 1 MG/DOSE,) 2 MG/1.5ML solution pen-injector, Inject 1 mg under the skin into the appropriate area as directed 1 (One) Time Per Week., Disp: 3 mL, Rfl: 3    silver sulfadiazine (SILVADENE, SSD) 1 % cream, Apply 1 Application topically to the appropriate  "area as directed 2 (Two) Times a Day., Disp: 400 g, Rfl: 0    amoxicillin-clavulanate (AUGMENTIN) 875-125 MG per tablet, Take 1 tablet by mouth 2 (Two) Times a Day., Disp: 30 tablet, Rfl: 0    cephalexin (KEFLEX) 500 MG capsule, Take 1 capsule by mouth 3 (Three) Times a Day. (Patient not taking: Reported on 11/19/2024), Disp: 30 capsule, Rfl: 0    HYDROcodone Bit-Homatrop MBr (HYCODAN) 5-1.5 MG/5ML solution, Take 5 mL by mouth At Night As Needed for Cough., Disp: 180 mL, Rfl: 0    mupirocin (BACTROBAN) 2 % nasal ointment, Administer 1 Application into the nostril(s) as directed by provider 2 (Two) Times a Day., Disp: 30 g, Rfl: 2         Review of Systems   Constitutional:  Negative for chills and diaphoresis.   HENT:  Positive for congestion and rhinorrhea. Negative for ear pain and sore throat.    Eyes:  Negative for visual disturbance.   Respiratory:  Positive for cough. Negative for shortness of breath.    Cardiovascular:  Negative for chest pain and palpitations.   Gastrointestinal:  Positive for abdominal pain. Negative for diarrhea, nausea and vomiting.   Endocrine: Negative for polydipsia and polyphagia.   Musculoskeletal:  Negative for neck stiffness.   Skin:  Negative for color change and pallor.   Neurological:  Negative for seizures and syncope.   Hematological:  Negative for adenopathy.   Psychiatric/Behavioral:  Negative for hallucinations and suicidal ideas.        I have reviewed and confirmed the accuracy of the ROS as documented by the MA/LPN/RN Brandin Sunshine MD      Objective   /80 (BP Location: Right arm, Patient Position: Sitting, Cuff Size: Large Adult)   Pulse 102   Temp 98 °F (36.7 °C) (Temporal)   Resp 18   Ht 165.1 cm (65\")   Wt 127 kg (279 lb)   LMP 08/01/2019   SpO2 97%   BMI 46.43 kg/m²   BP Readings from Last 3 Encounters:   11/19/24 110/80   09/17/24 120/84   07/17/24 124/80     Wt Readings from Last 3 Encounters:   11/19/24 127 kg (279 lb)   09/17/24 128 kg (282 lb) "   07/17/24 132 kg (290 lb 9.6 oz)     Physical Exam  Constitutional:       Appearance: Normal appearance. She is well-developed. She is not diaphoretic.   HENT:      Head: Normocephalic.      Right Ear: Hearing, tympanic membrane, ear canal and external ear normal. A middle ear effusion is present. Tympanic membrane is erythematous.      Left Ear: Hearing, tympanic membrane, ear canal and external ear normal. A middle ear effusion is present. Tympanic membrane is erythematous.      Nose: Nose normal. Congestion present. No nasal deformity, nasal tenderness or mucosal edema.      Right Sinus: Maxillary sinus tenderness and frontal sinus tenderness present.      Left Sinus: Maxillary sinus tenderness and frontal sinus tenderness present.      Comments: Small erosion in the left nares with scab, benign appearance     Mouth/Throat:      Mouth: No oral lesions.      Pharynx: Uvula midline. Posterior oropharyngeal erythema present. No oropharyngeal exudate.      Tonsils: No tonsillar exudate or tonsillar abscesses. 1+ on the right. 1+ on the left.   Eyes:      General: Lids are normal.      Conjunctiva/sclera: Conjunctivae normal.      Pupils: Pupils are equal, round, and reactive to light.   Neck:      Meningeal: Brudzinski's sign and Kernig's sign absent.   Cardiovascular:      Rate and Rhythm: Normal rate and regular rhythm.      Pulses: Normal pulses.      Heart sounds: Normal heart sounds, S1 normal and S2 normal. No murmur heard.     No friction rub. No gallop.   Pulmonary:      Effort: Pulmonary effort is normal. No accessory muscle usage or respiratory distress.      Breath sounds: No stridor. Examination of the right-upper field reveals wheezing and rhonchi. Examination of the left-upper field reveals wheezing and rhonchi. Examination of the right-middle field reveals wheezing and rhonchi. Examination of the left-middle field reveals wheezing and rhonchi. Examination of the right-lower field reveals wheezing and  "rhonchi. Examination of the left-lower field reveals wheezing and rhonchi. Wheezing and rhonchi present. No decreased breath sounds or rales.   Abdominal:      General: Bowel sounds are normal. There is no distension.      Palpations: Abdomen is soft. There is no mass.      Tenderness: There is no abdominal tenderness.      Hernia: No hernia is present.   Skin:     General: Skin is warm and dry.      Coloration: Skin is not pale.   Neurological:      Mental Status: She is alert and oriented to person, place, and time.      Cranial Nerves: No cranial nerve deficit.      Coordination: Coordination normal.      Gait: Gait normal.         Data / Lab Results:    Hemoglobin A1C   Date Value Ref Range Status   09/17/2024 5.8 (A) 4.5 - 5.7 % Final   05/08/2024 6.5 (A) 4.5 - 5.7 % Final   01/24/2024 6.9 (A) 4.5 - 5.7 % Final     Lab Results   Component Value Date    Glucose 108 (H) 10/08/2024    Glucose 112 06/26/2024    Glucose, UA Negative 06/26/2024     Lab Results   Component Value Date    LDL 81 10/08/2024    LDL 84 10/02/2023    LDL 73 09/06/2022     Lab Results   Component Value Date    CHOL 129 05/02/2019    CHOL 136 11/01/2016     Lab Results   Component Value Date    TRIG 102 10/08/2024    TRIG 97 10/02/2023    TRIG 79 09/06/2022     Lab Results   Component Value Date    HDL 55 10/08/2024    HDL 51 10/02/2023    HDL 51 09/06/2022     No results found for: \"PSA\"  Lab Results   Component Value Date    WBC 7.5 10/08/2024    HGB 14.9 10/08/2024    HCT 46.1 10/08/2024    MCV 93 10/08/2024     10/08/2024     Lab Results   Component Value Date    TSH 3.350 10/08/2024      Lab Results   Component Value Date    GLUCOSE 108 (H) 10/08/2024    BUN 9 10/08/2024    CREATININE 0.62 10/08/2024    EGFRIFNONA 108 05/02/2019    EGFRIFAFRI 126 05/02/2019    BCR 15 10/08/2024    K 4.1 10/08/2024    CO2 23 10/08/2024    CALCIUM 9.7 10/08/2024    PROTENTOTREF 7.0 10/08/2024    ALBUMIN 4.2 10/08/2024    LABIL2 1.4 10/02/2023    AST " "15 10/08/2024    ALT 20 10/08/2024     No results found for: \"FARHANA\", \"RF\", \"SEDRATE\"   Lab Results   Component Value Date    CRP 1.74 (H) 08/08/2023      No results found for: \"IRON\", \"TIBC\", \"FERRITIN\"   No results found for: \"YKVHORFV06\"       Assessment & Plan      Medications        Problem List         LOS    Acute bacterial bronchitis antibiotics.  Fluid intake.  Call return if fever or worsening symptoms.    Left lower quadrant pain/cramping.  Overall benign exam today, with, carotid palpation overall improved on fiber.  DDx includes diverticulitis, overall mild per exam, no fever, start antibiotics.  Increase fluid intake.  Consider further eval if worsening persistent symptoms.  Call return if fever, no keep fluids down, worsening symptoms.  Nasal lesion.  Benign exam today, likely secondary infection, start topical Bactroban.  Consider HEENT referral if persistent symptoms.  Health maintenance.  Doing well, vaccines current.  pneumonia vaccine sched.  Discussed calcium vitamin D.  D iscussed coated baby aspirin daily.  Discussed health maintenance, screening test last modification.  Mammogram benign 2020, 2022, 2023.  Pap/H PV neg 2022.  Type II diabetes.  New onset improved, A1c to 6.5, tolerating increased dose ozempic, and then.   Discussed diet, exercise, lifestyle modification, weight loss.  Monitor BS at home .  Follow up recheck. Plan add lisinipril.  With neuropathy add gabapentin.  Elevated LFTs.  Improved/resolved on recheck.  Mild, new onset.  DDx includes fatty liver add fish oil.  Follow-up recheck.  Consider further eval persistent elevation.  Left leg pain evaluated with numbness/tingling.    Improved today, x-ray with mild signs of lumbar degeneration, continue as needed nighttime muscle relaxant, rehabilitation exercise discussed, nightly amitriptyline.  Follow-up recheck.  Add as needed meloxicam.  Lower extremity edema.  Improved today back on furosemide/potassium.  DDx includes venous " insufficiency, sleep apnea.  Echo benign / with increased r sided pressure 2023..  JOAQUIN hose.  Discussed low-sodium diet.  Recommend sleep eval she agrees today.  Follow-up recheck.  Consider further eval if persistent symptoms.  Right lower extremity cellulitis.  Improved/resolved today, has completed course antibiotics.  DDx includes DVT ultrasound benign.  Follow-up recheck.  Return if fever or worsening symptoms.  Allergic rhinitis.  Over-the-counter Claritin or Zyrtec.  Umbilical hernia.  Stable today.  Followed by Dr. Diop, working on weight loss, monitoring. S/s incarceration discussed.  2.6 cm per CT scan 6/23.  Obesity.  Improved, working on diet and exercise, has lost weight.  Recommend bariatric surgery, sleep study she agrees to sleep study today.  Improved on ozempic.   Abnormal stress test.  Stress Cardiolite with abnormal area, subsequent echo normal.  Followed by cardiology.  Exercise has been recommended.  Thoracic back pain.  Strain.  Benign exam today.  Start as needed meloxicam.  Rehabilitation exercises discussed.  Add PRN muscle relaxer.  Follow-up recheck.  Consider imaging if persistent symptoms.  Osteoarthritis knees.  Improved today s/p cartilage ijxn, followed by ortho / follow up schef.  Improved on meloxicam as needed, improved status post recent injection.  Still with pain limiting exercise check x-rays, start PT.  Follow-up recheck.  Consider orthopedics referral, repeat injection if persistent symptoms..  symptoms worse on right.  Ice, rehabilitation exercise discussed.  Call return if worsening symptoms.  Colon cancer screening.  Recommend colonoscopy/Cologuard, Cologuard scheduled.  Shortness of breath.  Multifactorial, likely secondary to deconditioning.  Has had cardiology eval.  DDx includes COPD add inhaler.  Increase exercise.  Consider PFTs.  Follow-up recheck.  Cellulitis abdomen.  Improved today has completed course antibiotics.  Overlying hernia, complicating abrasion.  Improving today, continue antibiotics/ change to bactrim.  topical care discussed.   Call / return if worsening symptoms.   Viral conjunctivitis.  Benign exam today, start antibiotics.   Call or return if worsening or persistent symptoms.  Blood in stool.  Overall benign exam today, likely secondary to hemorrhoids.  Increase fluids/fiber.  Topical care discussed.  Check hemoglobin.  Cologuard rescheduled.  Call return if worsening symptoms.  Facial numbness/transient weakness.  Clinically improved today, status post eval per ED with benign CT brain, has had benign Optho eval per her report, will get records.  DDx includes TIA, neuropathy, She stopped coated baby aspirin daily, restart, MRI brain benign, EKG benign today, Holter with short vruns onky max 7 beats, carotid Doppler With mild bilateral blockage only, echocardiogram benign /bubble study negative.  Close follow-up scheduled.  Call return if recurrent symptoms.  Constipation.  Increase fluids/fiber.  Add fiber/stool softeners.  Call return if worsening symptoms.        Diagnoses and all orders for this visit:    1. Acute URI (Primary)  -     mupirocin (BACTROBAN) 2 % nasal ointment; Administer 1 Application into the nostril(s) as directed by provider 2 (Two) Times a Day.  Dispense: 30 g; Refill: 2    2. Class 3 severe obesity due to excess calories with serious comorbidity and body mass index (BMI) of 45.0 to 49.9 in adult    3. Type 2 diabetes mellitus with diabetic polyneuropathy, without long-term current use of insulin  -     Semaglutide, 1 MG/DOSE, (Ozempic, 1 MG/DOSE,) 2 MG/1.5ML solution pen-injector; Inject 1 mg under the skin into the appropriate area as directed 1 (One) Time Per Week.  Dispense: 3 mL; Refill: 3    4. Acute cough  -     amoxicillin-clavulanate (AUGMENTIN) 875-125 MG per tablet; Take 1 tablet by mouth 2 (Two) Times a Day.  Dispense: 30 tablet; Refill: 0  -     HYDROcodone Bit-Homatrop MBr (HYCODAN) 5-1.5 MG/5ML solution; Take 5 mL  by mouth At Night As Needed for Cough.  Dispense: 180 mL; Refill: 0    5. Nasal lesion    6. Primary osteoarthritis of both knees    7. ELKIN (obstructive sleep apnea)        Class 3 Severe Obesity (BMI >=40). Obesity-related health conditions include the following: diabetes mellitus. Obesity is unchanged. BMI is is above average; BMI management plan is completed. We discussed portion control and increasing exercise.        Expected course, medications, and adverse effects discussed.  Call or return if worsening or persistent symptoms.  I wore protective equipment throughout this patient encounter including a mask, gloves, and eye protection.  Hand hygiene was performed before donning protective equipment and after removal when leaving the room. The complete contents of the Assessment and Plan and Data/Lab Results as documented above have been reviewed and addressed by myself with the patient today as part of an ongoing evaluation / treatment plan.  If some of the documentation has been copied from a previous note and is unchanged it indicates that this problem / plan has been assessed today but is stable from a previous visit and no changes have been recommended.

## 2024-11-19 ENCOUNTER — OFFICE VISIT (OUTPATIENT)
Dept: FAMILY MEDICINE CLINIC | Facility: CLINIC | Age: 59
End: 2024-11-19
Payer: MEDICAID

## 2024-11-19 VITALS
SYSTOLIC BLOOD PRESSURE: 110 MMHG | RESPIRATION RATE: 18 BRPM | HEIGHT: 65 IN | TEMPERATURE: 98 F | BODY MASS INDEX: 46.48 KG/M2 | HEART RATE: 102 BPM | OXYGEN SATURATION: 97 % | WEIGHT: 279 LBS | DIASTOLIC BLOOD PRESSURE: 80 MMHG

## 2024-11-19 DIAGNOSIS — M17.0 PRIMARY OSTEOARTHRITIS OF BOTH KNEES: ICD-10-CM

## 2024-11-19 DIAGNOSIS — J06.9 ACUTE URI: Primary | ICD-10-CM

## 2024-11-19 DIAGNOSIS — G47.33 OSA (OBSTRUCTIVE SLEEP APNEA): ICD-10-CM

## 2024-11-19 DIAGNOSIS — E66.813 CLASS 3 SEVERE OBESITY DUE TO EXCESS CALORIES WITH SERIOUS COMORBIDITY AND BODY MASS INDEX (BMI) OF 45.0 TO 49.9 IN ADULT: ICD-10-CM

## 2024-11-19 DIAGNOSIS — E66.01 CLASS 3 SEVERE OBESITY DUE TO EXCESS CALORIES WITH SERIOUS COMORBIDITY AND BODY MASS INDEX (BMI) OF 45.0 TO 49.9 IN ADULT: ICD-10-CM

## 2024-11-19 DIAGNOSIS — R05.1 ACUTE COUGH: ICD-10-CM

## 2024-11-19 DIAGNOSIS — E11.42 TYPE 2 DIABETES MELLITUS WITH DIABETIC POLYNEUROPATHY, WITHOUT LONG-TERM CURRENT USE OF INSULIN: ICD-10-CM

## 2024-11-19 DIAGNOSIS — J34.89 NASAL LESION: ICD-10-CM

## 2024-11-19 PROCEDURE — 1125F AMNT PAIN NOTED PAIN PRSNT: CPT | Performed by: FAMILY MEDICINE

## 2024-11-19 PROCEDURE — 3044F HG A1C LEVEL LT 7.0%: CPT | Performed by: FAMILY MEDICINE

## 2024-11-19 PROCEDURE — 99214 OFFICE O/P EST MOD 30 MIN: CPT | Performed by: FAMILY MEDICINE

## 2024-11-19 RX ORDER — HYDROCODONE BITARTRATE AND HOMATROPINE METHYLBROMIDE ORAL SOLUTION 5; 1.5 MG/5ML; MG/5ML
5 LIQUID ORAL NIGHTLY PRN
Qty: 180 ML | Refills: 0 | Status: SHIPPED | OUTPATIENT
Start: 2024-11-19

## 2024-11-19 RX ORDER — SEMAGLUTIDE 1.34 MG/ML
1 INJECTION, SOLUTION SUBCUTANEOUS WEEKLY
Qty: 3 ML | Refills: 3 | Status: SHIPPED | OUTPATIENT
Start: 2024-11-19

## 2024-11-25 ENCOUNTER — TELEPHONE (OUTPATIENT)
Dept: FAMILY MEDICINE CLINIC | Facility: CLINIC | Age: 59
End: 2024-11-25
Payer: MEDICAID

## 2025-01-21 NOTE — PROGRESS NOTES
Subjective   Candice Swan is a 59 y.o. female.     Chief Complaint   Patient presents with    Annual Exam    Diabetes    Hyperlipidemia    URI       The patient is here: to discuss health maintenance and disease prevention to follow up on multiple medical problems.  Last comprehensive physical was on 10/18/2023.  Previous physical was performed by  Brandin Sunshine MD  Overall has: sedentary activity, good appetite, feels well with minor complaints, good energy level, and is sleeping poorly. Nutrition: balanced diet. Last tetanus shot was  11/1/2016 . Patient is doing routine self breast exams: every 2 - 3 months    Diabetes  She presents for her follow-up diabetic visit. She has type 2 diabetes mellitus. Hypoglycemia symptoms include headaches. Pertinent negatives for hypoglycemia include no dizziness, pallor, seizures, sleepiness or speech difficulty. Pertinent negatives for diabetes include no blurred vision, no chest pain, no fatigue, no foot paresthesias, no foot ulcerations, no polydipsia, no polyphagia and no weakness. Risk factors for coronary artery disease include obesity and dyslipidemia. Current diabetic treatment includes insulin injections. She is compliant with treatment all of the time. Her weight is decreasing steadily. She is following a generally healthy diet. She has not had a previous visit with a dietitian. (Candice does not check her sugars on a regular basis )   Hyperlipidemia  This is a chronic problem. The current episode started more than 1 year ago. Recent lipid tests were reviewed and are normal. Exacerbating diseases include diabetes and obesity. Associated symptoms include leg pain and shortness of breath. Pertinent negatives include no chest pain. Current antihyperlipidemic treatment includes diet change and exercise. There are no compliance problems.  Risk factors for coronary artery disease include obesity.   URI   This is a new problem. The current episode started 1 to 4 weeks ago.  The problem has been gradually improving. There has been no fever. Associated symptoms include congestion, coughing, ear pain, headaches, rhinorrhea and sinus pain. Pertinent negatives include no abdominal pain, chest pain or nausea. Associated symptoms comments: Chest heaviness. She has tried decongestant for the symptoms. The treatment provided no relief.        Recent Hospitalizations:  No hospitalization(s) within the last year..  ccc      I personally reviewed and updated the patient's allergies, medications, problem list, and past medical, surgical, social, and family history. I have reviewed and confirmed the accuracy of the HPI and ROS as documented by the MA/LPN/RN Brandin Sunshine MD    Family History   Problem Relation Age of Onset    Diabetes Mother     Heart disease Mother        Social History     Tobacco Use    Smoking status: Never     Passive exposure: Never    Smokeless tobacco: Never   Vaping Use    Vaping status: Never Used   Substance Use Topics    Alcohol use: No    Drug use: No       Past Surgical History:   Procedure Laterality Date    CARDIOVASCULAR STRESS TEST  06/11/2019    abnormal    CHOLECYSTECTOMY      ECHO - CONVERTED  2019       Patient Active Problem List   Diagnosis    Degeneration of intervertebral disc at C5-C6 level    Numbness and tingling in left arm    Umbilical hernia    Osteoarthritis of both knees    Class 3 severe obesity due to excess calories with serious comorbidity and body mass index (BMI) of 50.0 to 59.9 in adult    Abnormal nuclear stress test    Fatigue    Allergic rhinitis    Fatty liver    Mixed hyperlipidemia    Empty sella    Cervical disc disease    Lipoma    GERD (gastroesophageal reflux disease)    Spasm eyelid    SOB (shortness of breath)    Dysuria    Annual visit for general adult medical examination with abnormal findings    Screening for malignant neoplasm of breast    Screening for malignant neoplasm of cervix    Acute bacterial sinusitis    Seasonal  allergic rhinitis due to pollen    Acute pain of left knee    Neuropathy    Screen for colon cancer    Acute left-sided thoracic back pain    TIA (transient ischemic attack)    Type 2 diabetes mellitus with neurologic complication, without long-term current use of insulin    Cellulitis of right lower extremity    ELKIN (obstructive sleep apnea)    Nasal lesion         Current Outpatient Medications:     Accu-Chek Guide test strip, USE TEST STRIPS TO TEST GLUCOSE LEVELS TWICE DAILY, Disp: , Rfl:     Accu-Chek Softclix Lancets lancets, USE LANCETS TO TEST GLUCOSE TWICE DAILY, Disp: , Rfl:     ASPIRIN 81 PO, Take  by mouth., Disp: , Rfl:     Blood Glucose Monitoring Suppl (Accu-Chek Guide) w/Device kit, USE TO TEST BLOOD SUGARS TWICE DAILY, Disp: , Rfl:     docusate sodium (COLACE) 100 MG capsule, Take 1 capsule by mouth 2 (Two) Times a Day., Disp: , Rfl:     gabapentin (NEURONTIN) 100 MG capsule, Take 1 capsule by mouth 3 (Three) Times a Day As Needed (knee pain)., Disp: 90 capsule, Rfl: 2    HYDROcodone Bit-Homatrop MBr (HYCODAN) 5-1.5 MG/5ML solution, Take 5 mL by mouth At Night As Needed for Cough., Disp: 180 mL, Rfl: 0    mupirocin (BACTROBAN) 2 % nasal ointment, Administer 1 Application into the nostril(s) as directed by provider 2 (Two) Times a Day., Disp: 30 g, Rfl: 2    Semaglutide, 1 MG/DOSE, (Ozempic, 1 MG/DOSE,) 2 MG/1.5ML solution pen-injector, Inject 1 mg under the skin into the appropriate area as directed 1 (One) Time Per Week. Okay to compound, Disp: 3 mL, Rfl: 3    benzonatate (TESSALON) 200 MG capsule, Take 1 capsule by mouth 3 (Three) Times a Day As Needed for Cough., Disp: 90 capsule, Rfl: 2    ciprofloxacin (CIPRO) 500 MG tablet, Take 1 tablet by mouth 2 (Two) Times a Day., Disp: 20 tablet, Rfl: 0    furosemide (LASIX) 40 MG tablet, Take 1 tablet by mouth Daily As Needed (swelling). (Patient not taking: Reported on 1/24/2025), Disp: 30 tablet, Rfl: 2    hydrocortisone (ANUSOL-HC) 25 MG suppository,  Insert 1 suppository into the rectum 2 (Two) Times a Day. (Patient not taking: Reported on 1/24/2025), Disp: 24 each, Rfl: 5    lisinopril (PRINIVIL,ZESTRIL) 5 MG tablet, Take 1 tablet by mouth Daily., Disp: 90 tablet, Rfl: 3    meloxicam (MOBIC) 15 MG tablet, Take 1 tablet by mouth Daily As Needed for Moderate Pain. (Patient not taking: Reported on 1/24/2025), Disp: 90 tablet, Rfl: 1    polycarbophil (calcium polycarbophil) 625 MG tablet tablet, Take 1 tablet by mouth Daily. (Patient not taking: Reported on 1/24/2025), Disp: , Rfl:     potassium chloride (KLOR-CON M20) 20 MEQ CR tablet, Take 1 tablet by mouth Daily. (Patient not taking: Reported on 1/24/2025), Disp: 30 tablet, Rfl: 2    silver sulfadiazine (SILVADENE, SSD) 1 % cream, Apply 1 Application topically to the appropriate area as directed 2 (Two) Times a Day. (Patient not taking: Reported on 1/24/2025), Disp: 400 g, Rfl: 0    Review of Systems   Constitutional:  Negative for chills, diaphoresis and fatigue.   HENT:  Positive for congestion, ear pain and rhinorrhea. Negative for trouble swallowing and voice change.    Eyes:  Negative for blurred vision and visual disturbance.   Respiratory:  Positive for cough and shortness of breath.    Cardiovascular:  Negative for chest pain and palpitations.   Gastrointestinal:  Negative for abdominal pain and nausea.   Endocrine: Negative for polydipsia and polyphagia.   Genitourinary:  Negative for hematuria.   Musculoskeletal:  Negative for neck stiffness.   Skin:  Negative for color change and pallor.   Allergic/Immunologic: Negative for immunocompromised state.   Neurological:  Negative for dizziness, seizures, syncope, speech difficulty and weakness.   Hematological:  Negative for adenopathy.   Psychiatric/Behavioral:  Negative for hallucinations, sleep disturbance and suicidal ideas.        I have reviewed and confirmed the accuracy of the ROS as documented by the MA/LPN/RN Brandin Sunshine MD      Objective   BP  "148/82   Pulse (!) 125   Temp 97.8 °F (36.6 °C) (Temporal)   Resp 18   Ht 165.1 cm (65\")   Wt 125 kg (276 lb 6.4 oz)   LMP 08/01/2019   SpO2 98% Comment: room air  BMI 46.00 kg/m²   BP Readings from Last 3 Encounters:   01/24/25 148/82   11/19/24 110/80   09/17/24 120/84     Wt Readings from Last 3 Encounters:   01/24/25 125 kg (276 lb 6.4 oz)   11/19/24 127 kg (279 lb)   09/17/24 128 kg (282 lb)     Physical Exam  Constitutional:       Appearance: Normal appearance. She is well-developed. She is not diaphoretic.   HENT:      Head: Normocephalic and atraumatic.      Right Ear: Hearing, tympanic membrane, ear canal and external ear normal.      Left Ear: Hearing, tympanic membrane, ear canal and external ear normal.      Nose: Nose normal. No mucosal edema or congestion.      Right Sinus: No maxillary sinus tenderness or frontal sinus tenderness.      Left Sinus: No maxillary sinus tenderness or frontal sinus tenderness.      Mouth/Throat:      Mouth: Mucous membranes are moist. No oral lesions.      Pharynx: Uvula midline. No oropharyngeal exudate or posterior oropharyngeal erythema.      Tonsils: No tonsillar exudate.   Eyes:      General: Lids are normal.      Extraocular Movements: Extraocular movements intact.      Conjunctiva/sclera: Conjunctivae normal.      Pupils: Pupils are equal, round, and reactive to light.   Neck:      Thyroid: No thyromegaly.      Vascular: No carotid bruit or JVD.      Trachea: No tracheal deviation.   Cardiovascular:      Rate and Rhythm: Normal rate and regular rhythm.      Heart sounds: Normal heart sounds. No murmur heard.     No friction rub. No gallop.   Pulmonary:      Effort: Pulmonary effort is normal.      Breath sounds: Normal breath sounds. No stridor. No decreased breath sounds, wheezing or rales.   Abdominal:      General: Bowel sounds are normal. There is no distension.      Palpations: Abdomen is soft. There is no mass.      Tenderness: There is no abdominal " "tenderness. There is no guarding or rebound.      Hernia: No hernia is present.   Lymphadenopathy:      Head:      Right side of head: No submental, submandibular, tonsillar, preauricular, posterior auricular or occipital adenopathy.      Left side of head: No submental, submandibular, tonsillar, preauricular, posterior auricular or occipital adenopathy.      Cervical: No cervical adenopathy.   Skin:     General: Skin is warm and dry.      Coloration: Skin is not pale.   Neurological:      Mental Status: She is alert and oriented to person, place, and time.      Cranial Nerves: No cranial nerve deficit.      Sensory: No sensory deficit.      Coordination: Coordination normal.      Gait: Gait normal.      Deep Tendon Reflexes: Reflexes are normal and symmetric.       Data / Lab Results:    Hemoglobin A1C   Date Value Ref Range Status   01/24/2025 6.1 (A) 4.5 - 5.7 % Final   09/17/2024 5.8 (A) 4.5 - 5.7 % Final   05/08/2024 6.5 (A) 4.5 - 5.7 % Final     Lab Results   Component Value Date    Glucose 108 (H) 10/08/2024    Glucose 112 06/26/2024    Glucose, UA Negative 01/24/2025     Lab Results   Component Value Date    LDL 81 10/08/2024    LDL 84 10/02/2023    LDL 73 09/06/2022     Lab Results   Component Value Date    CHOL 129 05/02/2019    CHOL 136 11/01/2016     Lab Results   Component Value Date    TRIG 102 10/08/2024    TRIG 97 10/02/2023    TRIG 79 09/06/2022     Lab Results   Component Value Date    HDL 55 10/08/2024    HDL 51 10/02/2023    HDL 51 09/06/2022     No results found for: \"PSA\"  Lab Results   Component Value Date    WBC 7.5 10/08/2024    HGB 14.9 10/08/2024    HCT 46.1 10/08/2024    MCV 93 10/08/2024     10/08/2024     Lab Results   Component Value Date    TSH 3.350 10/08/2024     Lab Results   Component Value Date    GLUCOSE 108 (H) 10/08/2024    BUN 9 10/08/2024    CREATININE 0.62 10/08/2024    EGFRIFNONA 108 05/02/2019    EGFRIFAFRI 126 05/02/2019    BCR 15 10/08/2024    K 4.1 10/08/2024    " "CO2 23 10/08/2024    CALCIUM 9.7 10/08/2024    PROTENTOTREF 7.0 10/08/2024    ALBUMIN 4.2 10/08/2024    LABIL2 1.4 10/02/2023    AST 15 10/08/2024    ALT 20 10/08/2024     No results found for: \"FARHANA\", \"RF\", \"SEDRATE\"   Lab Results   Component Value Date    CRP 1.74 (H) 08/08/2023      No results found for: \"IRON\", \"TIBC\", \"FERRITIN\"   No results found for: \"AIYYXMEV14\"     Age-appropriate Screening Schedule:  Refer to the list below for future screening recommendations based on patient's age, sex and/or medical conditions. Orders for these recommended tests are listed in the plan section. The patient has been provided with a written plan.    Health Maintenance   Topic Date Due    COLORECTAL CANCER SCREENING  Never done    Hepatitis B (1 of 3 - 19+ 3-dose series) Never done    ZOSTER VACCINE (1 of 2) Never done    COVID-19 Vaccine (4 - 2024-25 season) 01/26/2025 (Originally 1/13/2025)    Pneumococcal Vaccine 0-64 (1 of 2 - PCV) 02/26/2025 (Originally 4/24/1971)    INFLUENZA VACCINE  08/01/2025 (Originally 7/1/2024)    HEPATITIS C SCREENING  11/19/2025 (Originally 6/17/2019)    DIABETIC EYE EXAM  07/22/2025    HEMOGLOBIN A1C  07/24/2025    PAP SMEAR  10/05/2025    LIPID PANEL  10/08/2025    MAMMOGRAM  11/02/2025    ANNUAL PHYSICAL  01/24/2026    BMI FOLLOWUP  01/24/2026    TDAP/TD VACCINES (2 - Td or Tdap) 11/01/2026    URINE MICROALBUMIN  Discontinued           Assessment & Plan      Medications        Problem List         LOS    Physical.  Doing well, vaccines current.  pneumonia vaccine sched.  Discussed calcium vitamin D.  D iscussed coated baby aspirin daily.  Discussed health maintenance, screening test last modification.  Mammogram benign 2020, 2022, 2023, recheck.  Pap/H PV neg 2022.  Acute bacterial bronchitis.  Start antibiotics, increase fluid intake.  Call return if fever worsening symptoms.  Type II diabetes.  New onset improved, A1c to 6.5, tolerating increased dose ozempic, and then.   Discussed diet, " exercise, lifestyle modification, weight loss.  Monitor BS at home .  Follow up recheck.  add lisinipril.  With neuropathy add gabapentin.  Elevated LFTs.  Improved/resolved on recheck.  Mild, new onset.  DDx includes fatty liver add fish oil.  Follow-up recheck.  Consider further eval persistent elevation.  Left leg pain evaluated with numbness/tingling.    Improved today, x-ray with mild signs of lumbar degeneration, continue as needed nighttime muscle relaxant, rehabilitation exercise discussed, nightly amitriptyline.  Follow-up recheck.  Add as needed meloxicam.  Lower extremity edema.  Improved today back on furosemide/potassium.  DDx includes venous insufficiency, sleep apnea.  Echo benign / with increased r sided pressure 2023..  JOAQUIN hose.  Discussed low-sodium diet.  Recommend sleep eval she agrees today.  Follow-up recheck.  Consider further eval if persistent symptoms.  Right lower extremity cellulitis.  Improved/resolved today, has completed course antibiotics.  DDx includes DVT ultrasound benign.  Follow-up recheck.  Return if fever or worsening symptoms.  Allergic rhinitis.  Over-the-counter Claritin or Zyrtec.  Umbilical hernia.  Stable today.  Followed by Dr. Diop, working on weight loss, monitoring. S/s incarceration discussed.  2.6 cm per CT scan 6/23.  Obesity.  Improved, working on diet and exercise, has lost weight.  Recommend bariatric surgery, sleep study she declines currently.   improved on ozempic.   Abnormal stress test.  Stress Cardiolite with abnormal area, subsequent echo normal.  Followed by cardiology.  Exercise has been recommended.  Thoracic back pain.  Strain.  Benign exam today.  Start as needed meloxicam.  Rehabilitation exercises discussed.  Add PRN muscle relaxer.  Follow-up recheck.  Consider imaging if persistent symptoms.  Osteoarthritis knees.  Improved today s/p cartilage ijxn, followed by ortho / follow up schef.  Improved on meloxicam as needed, improved status post  recent injection.  Still with pain limiting exercise check x-rays, start PT.  Follow-up recheck.  Consider orthopedics referral, repeat injection if persistent symptoms..  symptoms worse on right.  Ice, rehabilitation exercise discussed.  Call return if worsening symptoms.  Colon cancer screening.  Recommend colonoscopy/Cologuard, Cologuard scheduled.  Shortness of breath.  Multifactorial, likely secondary to deconditioning.  Has had cardiology eval.  DDx includes COPD add inhaler.  Increase exercise.  Consider PFTs.  Follow-up recheck.  Cellulitis abdomen.  Improved today has completed course antibiotics.  Overlying hernia, complicating abrasion. Improving today, continue antibiotics/ change to bactrim.  topical care discussed.   Call / return if worsening symptoms.   Viral conjunctivitis.  Benign exam today, start antibiotics.   Call or return if worsening or persistent symptoms.  Blood in stool.  Overall benign exam today, likely secondary to hemorrhoids.  Increase fluids/fiber.  Topical care discussed.  Check hemoglobin.  Cologuard rescheduled.  Call return if worsening symptoms.  Facial numbness/transient weakness.  Clinically improved today, status post eval per ED with benign CT brain, has had benign Optho eval per her report, will get records.  DDx includes TIA, neuropathy, She stopped coated baby aspirin daily, restart, MRI brain benign, EKG benign today, Holter with short vruns onky max 7 beats, carotid Doppler With mild bilateral blockage only, echocardiogram benign /bubble study negative.  Close follow-up scheduled.  Call return if recurrent symptoms.  Constipation.  Increase fluids/fiber.  Add fiber/stool softeners.  Call return if worsening symptoms.  Acute bacterial bronchitis antibiotics.  Fluid intake.  Call return if fever or worsening symptoms.    Left lower quadrant pain/cramping.  Improved/resolved today, has completed course antibiotic.  Overall benign exam today, with, carotid palpation  overall improved on fiber.  DDx includes diverticulitis, overall mild per exam, no fever, start antibiotics.  Increase fluid intake.  Consider further eval if worsening persistent symptoms.  Call return if fever, no keep fluids down, worsening symptoms.  Burning left-sided abdominal pain.  Likely neuropathic, start as needed gabapentin.  Start as needed gabapentin.  Start as needed gabapentin.  Benign exam today.  Follow-up recheck.  Nasal lesion.  Benign exam today, likely secondary infection, start topical Bactroban.  Consider HEENT referral if persistent symptoms.    Previous Exams:  PAP Smear was on 10/5/15570 by Dr Sunshine.     Impression of Negative  Mammogram was on 11/2/2023 ordered by Dr Sunshine.     No mammographic signs of malignancy. Recommend routine mammographic  screening.    BI-RADS ASSESSMENT: BI-RADS 1. Negative.    Recommended repeat in 1 year  US Carotid Dopplers was on 11/2/2023 ordered by Dr Sunshine.     Minimal plaquing bilaterally. No hemodynamically significant stenosis.     Recommended repeat in 3-4 years  EKG was on 8/22/2023  Echocardiogram was on 2/9/2024 ordered by Dr Sunshine.       Left ventricular systolic function is normal. Left ventricular ejection fraction appears to be 56 - 60%.    Left ventricular diastolic function is consistent with (grade I) impaired relaxation.    Saline test results are negative.    Estimated right ventricular systolic pressure from tricuspid regurgitation is mildly elevated (35-45 mmHg).        Diagnoses and all orders for this visit:    1. Annual visit for general adult medical examination with abnormal findings (Primary)  -     POCT urinalysis dipstick, manual    2. Type 2 diabetes mellitus with diabetic polyneuropathy, without long-term current use of insulin  -     POC Glycosylated Hemoglobin (Hb A1C)  -     Semaglutide, 1 MG/DOSE, (Ozempic, 1 MG/DOSE,) 2 MG/1.5ML solution pen-injector; Inject 1 mg under the skin into the appropriate area as  directed 1 (One) Time Per Week. Okay to compound  Dispense: 3 mL; Refill: 3    3. Mixed hyperlipidemia    4. Acute URI    5. Class 3 severe obesity due to excess calories with serious comorbidity and body mass index (BMI) of 45.0 to 49.9 in adult    6. Screen for colon cancer  -     Cologuard - Stool, Per Rectum; Future    7. Screening for malignant neoplasm of cervix    8. Screening mammogram for breast cancer  -     Mammo Screening Digital Tomosynthesis Bilateral With CAD; Future    9. Pain in both lower extremities  -     gabapentin (NEURONTIN) 100 MG capsule; Take 1 capsule by mouth 3 (Three) Times a Day As Needed (knee pain).  Dispense: 90 capsule; Refill: 2    10. Acute cough  -     ciprofloxacin (CIPRO) 500 MG tablet; Take 1 tablet by mouth 2 (Two) Times a Day.  Dispense: 20 tablet; Refill: 0  -     benzonatate (TESSALON) 200 MG capsule; Take 1 capsule by mouth 3 (Three) Times a Day As Needed for Cough.  Dispense: 90 capsule; Refill: 2  -     HYDROcodone Bit-Homatrop MBr (HYCODAN) 5-1.5 MG/5ML solution; Take 5 mL by mouth At Night As Needed for Cough.  Dispense: 180 mL; Refill: 0    11. Primary hypertension  -     lisinopril (PRINIVIL,ZESTRIL) 5 MG tablet; Take 1 tablet by mouth Daily.  Dispense: 90 tablet; Refill: 3    12. Umbilical hernia with obstruction, without gangrene    13. ELKIN (obstructive sleep apnea)    14. Neuropathy    15. Acute bacterial sinusitis        Class 3 Severe Obesity (BMI >=40). Obesity-related health conditions include the following: diabetes mellitus and dyslipidemias. Obesity is unchanged. BMI is is above average; BMI management plan is completed. We discussed portion control and increasing exercise.        Expected course, medications, and adverse effects discussed.  Call or return if worsening or persistent symptoms.  I wore protective equipment throughout this patient encounter including a mask, gloves, and eye protection.  Hand hygiene was performed before donning protective  equipment and after removal when leaving the room. The complete contents of the Assessment and Plan and Data / Lab Results as documented above have been reviewed and addressed by myself with the patient today as part of an ongoing evaluation / treatment plan.  If some of the documentation has been copied from a previous note and is unchanged it indicates that this problem / plan has been assessed today but is stable from a previous visit and no changes have been recommended.  The separate E/M service provided today is significant, medically necessary, and separately identifiable.

## 2025-01-24 ENCOUNTER — OFFICE VISIT (OUTPATIENT)
Dept: FAMILY MEDICINE CLINIC | Facility: CLINIC | Age: 60
End: 2025-01-24
Payer: MEDICAID

## 2025-01-24 VITALS
SYSTOLIC BLOOD PRESSURE: 148 MMHG | HEART RATE: 125 BPM | WEIGHT: 276.4 LBS | HEIGHT: 65 IN | TEMPERATURE: 97.8 F | BODY MASS INDEX: 46.05 KG/M2 | DIASTOLIC BLOOD PRESSURE: 82 MMHG | RESPIRATION RATE: 18 BRPM | OXYGEN SATURATION: 98 %

## 2025-01-24 DIAGNOSIS — J06.9 ACUTE URI: ICD-10-CM

## 2025-01-24 DIAGNOSIS — E78.2 MIXED HYPERLIPIDEMIA: ICD-10-CM

## 2025-01-24 DIAGNOSIS — K42.0 UMBILICAL HERNIA WITH OBSTRUCTION, WITHOUT GANGRENE: ICD-10-CM

## 2025-01-24 DIAGNOSIS — G47.33 OSA (OBSTRUCTIVE SLEEP APNEA): ICD-10-CM

## 2025-01-24 DIAGNOSIS — M79.604 PAIN IN BOTH LOWER EXTREMITIES: ICD-10-CM

## 2025-01-24 DIAGNOSIS — E11.42 TYPE 2 DIABETES MELLITUS WITH DIABETIC POLYNEUROPATHY, WITHOUT LONG-TERM CURRENT USE OF INSULIN: ICD-10-CM

## 2025-01-24 DIAGNOSIS — G62.9 NEUROPATHY: ICD-10-CM

## 2025-01-24 DIAGNOSIS — M79.605 PAIN IN BOTH LOWER EXTREMITIES: ICD-10-CM

## 2025-01-24 DIAGNOSIS — B96.89 ACUTE BACTERIAL SINUSITIS: ICD-10-CM

## 2025-01-24 DIAGNOSIS — Z12.31 SCREENING MAMMOGRAM FOR BREAST CANCER: ICD-10-CM

## 2025-01-24 DIAGNOSIS — I10 PRIMARY HYPERTENSION: ICD-10-CM

## 2025-01-24 DIAGNOSIS — R05.1 ACUTE COUGH: ICD-10-CM

## 2025-01-24 DIAGNOSIS — J01.90 ACUTE BACTERIAL SINUSITIS: ICD-10-CM

## 2025-01-24 DIAGNOSIS — Z12.11 SCREEN FOR COLON CANCER: ICD-10-CM

## 2025-01-24 DIAGNOSIS — E66.01 CLASS 3 SEVERE OBESITY DUE TO EXCESS CALORIES WITH SERIOUS COMORBIDITY AND BODY MASS INDEX (BMI) OF 45.0 TO 49.9 IN ADULT: ICD-10-CM

## 2025-01-24 DIAGNOSIS — Z00.01 ANNUAL VISIT FOR GENERAL ADULT MEDICAL EXAMINATION WITH ABNORMAL FINDINGS: Primary | ICD-10-CM

## 2025-01-24 DIAGNOSIS — E66.813 CLASS 3 SEVERE OBESITY DUE TO EXCESS CALORIES WITH SERIOUS COMORBIDITY AND BODY MASS INDEX (BMI) OF 45.0 TO 49.9 IN ADULT: ICD-10-CM

## 2025-01-24 DIAGNOSIS — Z12.4 SCREENING FOR MALIGNANT NEOPLASM OF CERVIX: ICD-10-CM

## 2025-01-24 LAB
BILIRUB BLD-MCNC: NEGATIVE MG/DL
CLARITY, POC: CLEAR
COLOR UR: YELLOW
EXPIRATION DATE: ABNORMAL
GLUCOSE UR STRIP-MCNC: NEGATIVE MG/DL
HBA1C MFR BLD: 6.1 % (ref 4.5–5.7)
KETONES UR QL: NEGATIVE
LEUKOCYTE EST, POC: NEGATIVE
Lab: ABNORMAL
NITRITE UR-MCNC: NEGATIVE MG/ML
PH UR: 5 [PH] (ref 5–8)
PROT UR STRIP-MCNC: ABNORMAL MG/DL
RBC # UR STRIP: ABNORMAL /UL
SP GR UR: 1.01 (ref 1–1.03)
UROBILINOGEN UR QL: ABNORMAL

## 2025-01-24 PROCEDURE — 1125F AMNT PAIN NOTED PAIN PRSNT: CPT | Performed by: FAMILY MEDICINE

## 2025-01-24 PROCEDURE — 3044F HG A1C LEVEL LT 7.0%: CPT | Performed by: FAMILY MEDICINE

## 2025-01-24 PROCEDURE — 99396 PREV VISIT EST AGE 40-64: CPT | Performed by: FAMILY MEDICINE

## 2025-01-24 RX ORDER — BENZONATATE 200 MG/1
200 CAPSULE ORAL 3 TIMES DAILY PRN
Qty: 90 CAPSULE | Refills: 2 | Status: SHIPPED | OUTPATIENT
Start: 2025-01-24

## 2025-01-24 RX ORDER — SEMAGLUTIDE 1.34 MG/ML
1 INJECTION, SOLUTION SUBCUTANEOUS WEEKLY
Qty: 3 ML | Refills: 3 | Status: SHIPPED | OUTPATIENT
Start: 2025-01-24

## 2025-01-24 RX ORDER — HYDROCODONE BITARTRATE AND HOMATROPINE METHYLBROMIDE ORAL SOLUTION 5; 1.5 MG/5ML; MG/5ML
5 LIQUID ORAL NIGHTLY PRN
Qty: 180 ML | Refills: 0 | Status: SHIPPED | OUTPATIENT
Start: 2025-01-24

## 2025-01-24 RX ORDER — CIPROFLOXACIN 500 MG/1
500 TABLET, FILM COATED ORAL 2 TIMES DAILY
Qty: 20 TABLET | Refills: 0 | Status: SHIPPED | OUTPATIENT
Start: 2025-01-24

## 2025-01-24 RX ORDER — GABAPENTIN 100 MG/1
100 CAPSULE ORAL 3 TIMES DAILY PRN
Qty: 90 CAPSULE | Refills: 2 | Status: SHIPPED | OUTPATIENT
Start: 2025-01-24

## 2025-01-24 RX ORDER — LISINOPRIL 5 MG/1
5 TABLET ORAL DAILY
Qty: 90 TABLET | Refills: 3 | Status: SHIPPED | OUTPATIENT
Start: 2025-01-24

## 2025-02-10 ENCOUNTER — HOSPITAL ENCOUNTER (OUTPATIENT)
Dept: MAMMOGRAPHY | Facility: HOSPITAL | Age: 60
Discharge: HOME | End: 2025-02-10
Admitting: FAMILY MEDICINE
Payer: MEDICAID

## 2025-02-10 DIAGNOSIS — Z12.31 SCREENING MAMMOGRAM FOR BREAST CANCER: ICD-10-CM

## 2025-02-10 PROCEDURE — 77067 SCR MAMMO BI INCL CAD: CPT

## 2025-02-10 PROCEDURE — 77063 BREAST TOMOSYNTHESIS BI: CPT

## 2025-02-17 ENCOUNTER — TELEPHONE (OUTPATIENT)
Dept: FAMILY MEDICINE CLINIC | Facility: CLINIC | Age: 60
End: 2025-02-17
Payer: MEDICAID

## 2025-02-17 NOTE — TELEPHONE ENCOUNTER
I attempted to call patient's daughter with no answer. Left detail VM per verbal with Mammogram results. Patient to return call with any questions

## 2025-05-02 NOTE — PROGRESS NOTES
Subjective   Candice Swan is a 60 y.o. female.     Chief Complaint   Patient presents with    Diabetes    Hyperlipidemia    Foot Swelling     Bilateral Swelling with Redness        History of Present Illness  Foot Swelling:  Patient complains of symptoms including bilateral leg/foot swelling, itchiness, redness.  Described as tingling and burning feeling.  Worsen by walking and pressure.  Symptoms started Thursday, 5/1/2025.    Patient just got back from [place] on 5/2/2025. She was there for 2 weeks.   Diabetes  She presents for her follow-up diabetic visit. She has type 2 diabetes mellitus. Pertinent negatives for hypoglycemia include no pallor or seizures. Pertinent negatives for diabetes include no chest pain, no polydipsia and no polyphagia. Risk factors for coronary artery disease include obesity and dyslipidemia. Current diabetic treatment includes insulin injections. She is compliant with treatment all of the time. Her weight is decreasing steadily. She is following a generally healthy diet. She has not had a previous visit with a dietitian. (Candice does not check her sugars on a regular basis )   Hyperlipidemia  This is a chronic problem. The current episode started more than 1 year ago. Recent lipid tests were reviewed and are normal. Exacerbating diseases include diabetes and obesity. Associated symptoms include leg pain and shortness of breath. Pertinent negatives include no chest pain. Current antihyperlipidemic treatment includes diet change and exercise. There are no compliance problems.  Risk factors for coronary artery disease include obesity.            I personally reviewed and updated the patient's allergies, medications, problem list, and past medical, surgical, social, and family history. I have reviewed and confirmed the accuracy of the History of Present Illness and Review of Symptoms as documented by the MA/LETY/RN. Brandin Sunshine MD    Family History   Problem Relation Age of Onset     Diabetes Mother     Heart disease Mother        Social History     Tobacco Use    Smoking status: Never     Passive exposure: Never    Smokeless tobacco: Never   Vaping Use    Vaping status: Never Used   Substance Use Topics    Alcohol use: No    Drug use: No       Past Surgical History:   Procedure Laterality Date    CARDIOVASCULAR STRESS TEST  06/11/2019    abnormal    CHOLECYSTECTOMY      ECHO - CONVERTED  2019       Patient Active Problem List   Diagnosis    Degeneration of intervertebral disc at C5-C6 level    Numbness and tingling in left arm    Umbilical hernia    Osteoarthritis of both knees    Class 3 severe obesity due to excess calories with serious comorbidity and body mass index (BMI) of 50.0 to 59.9 in adult    Abnormal nuclear stress test    Fatigue    Allergic rhinitis    Fatty liver    Mixed hyperlipidemia    Empty sella    Cervical disc disease    Lipoma    GERD (gastroesophageal reflux disease)    Spasm eyelid    SOB (shortness of breath)    Dysuria    Annual visit for general adult medical examination with abnormal findings    Screening for malignant neoplasm of breast    Screening for malignant neoplasm of cervix    Acute bacterial sinusitis    Seasonal allergic rhinitis due to pollen    Acute pain of left knee    Neuropathy    Screen for colon cancer    Acute left-sided thoracic back pain    TIA (transient ischemic attack)    Type 2 diabetes mellitus with neurologic complication, without long-term current use of insulin    Cellulitis of right lower extremity    ELKIN (obstructive sleep apnea)    Nasal lesion    Cellulitis of lower extremity         Current Outpatient Medications:     Accu-Chek Guide test strip, USE TEST STRIPS TO TEST GLUCOSE LEVELS TWICE DAILY, Disp: , Rfl:     Accu-Chek Softclix Lancets lancets, USE LANCETS TO TEST GLUCOSE TWICE DAILY, Disp: , Rfl:     Blood Glucose Monitoring Suppl (Accu-Chek Guide) w/Device kit, USE TO TEST BLOOD SUGARS TWICE DAILY, Disp: , Rfl:     potassium  chloride (KLOR-CON M20) 20 MEQ CR tablet, Take 1 tablet by mouth Daily., Disp: 30 tablet, Rfl: 2    Semaglutide, 1 MG/DOSE, (Ozempic, 1 MG/DOSE,) 2 MG/1.5ML solution pen-injector, Inject 1 mg under the skin into the appropriate area as directed 1 (One) Time Per Week. Okay to Compound Semaglutide with B12 for Energy/Nutritional Support, Disp: 3 mL, Rfl: 3    ASPIRIN 81 PO, Take  by mouth. (Patient not taking: Reported on 5/5/2025), Disp: , Rfl:     cephalexin (KEFLEX) 500 MG capsule, Take 1 capsule by mouth 3 (Three) Times a Day., Disp: 30 capsule, Rfl: 0    furosemide (LASIX) 40 MG tablet, Take 1 tablet by mouth Daily As Needed (swelling). (Patient not taking: Reported on 5/5/2025), Disp: 30 tablet, Rfl: 2    gabapentin (NEURONTIN) 100 MG capsule, Take 1 capsule by mouth 3 (Three) Times a Day As Needed (knee pain)., Disp: 90 capsule, Rfl: 2    lisinopril (PRINIVIL,ZESTRIL) 5 MG tablet, Take 1 tablet by mouth Daily. (Patient not taking: Reported on 5/5/2025), Disp: 90 tablet, Rfl: 3    triamcinolone (KENALOG) 0.5 % cream, Apply 1 Application topically to the appropriate area as directed 2 (Two) Times a Day., Disp: 60 g, Rfl: 5          Review of Systems   Constitutional:  Negative for chills and diaphoresis.   HENT:  Negative for trouble swallowing and voice change.    Eyes:  Negative for visual disturbance.   Respiratory:  Positive for shortness of breath.    Cardiovascular:  Negative for chest pain and palpitations.   Gastrointestinal:  Negative for abdominal pain and nausea.   Endocrine: Negative for polydipsia and polyphagia.   Genitourinary:  Negative for hematuria.   Musculoskeletal:  Positive for back pain. Negative for neck stiffness.   Skin:  Negative for color change and pallor.   Allergic/Immunologic: Negative for immunocompromised state.   Neurological:  Negative for seizures and syncope.   Hematological:  Negative for adenopathy.   Psychiatric/Behavioral:  Negative for hallucinations, sleep disturbance  "and suicidal ideas.        I have reviewed and confirmed the accuracy of the ROS as documented by the MA/LPN/RN Brandin Sunshine MD      Objective   /80 (BP Location: Left arm, Patient Position: Sitting, Cuff Size: Large Adult)   Pulse 108   Temp 98 °F (36.7 °C) (Temporal)   Resp 18   Ht 165.1 cm (65\")   Wt 127 kg (279 lb 9.6 oz)   LMP 08/01/2019   SpO2 98%   BMI 46.53 kg/m²   BP Readings from Last 3 Encounters:   05/05/25 124/80   01/24/25 148/82   11/19/24 110/80     Wt Readings from Last 3 Encounters:   05/05/25 127 kg (279 lb 9.6 oz)   01/24/25 125 kg (276 lb 6.4 oz)   11/19/24 127 kg (279 lb)           Data / Lab Results:    Hemoglobin A1C   Date Value Ref Range Status   05/05/2025 6.3 (A) 4.5 - 5.7 % Final   01/24/2025 6.1 (A) 4.5 - 5.7 % Final   09/17/2024 5.8 (A) 4.5 - 5.7 % Final     Lab Results   Component Value Date    Glucose 108 (H) 10/08/2024    Glucose 112 06/26/2024    Glucose, UA Negative 05/05/2025     Lab Results   Component Value Date    LDL 81 10/08/2024    LDL 84 10/02/2023    LDL 73 09/06/2022     Lab Results   Component Value Date    CHOL 129 05/02/2019    CHOL 136 11/01/2016     Lab Results   Component Value Date    TRIG 102 10/08/2024    TRIG 97 10/02/2023    TRIG 79 09/06/2022     Lab Results   Component Value Date    HDL 55 10/08/2024    HDL 51 10/02/2023    HDL 51 09/06/2022     No results found for: \"PSA\"  Lab Results   Component Value Date    WBC 7.5 10/08/2024    HGB 14.9 10/08/2024    HCT 46.1 10/08/2024    MCV 93 10/08/2024     10/08/2024     Lab Results   Component Value Date    TSH 3.350 10/08/2024      Lab Results   Component Value Date    GLUCOSE 108 (H) 10/08/2024    BUN 9 10/08/2024    CREATININE 0.62 10/08/2024    EGFRIFNONA 108 05/02/2019    EGFRIFAFRI 126 05/02/2019    BCR 15 10/08/2024    K 4.1 10/08/2024    CO2 23 10/08/2024    CALCIUM 9.7 10/08/2024    ALBUMIN 4.2 10/08/2024    AST 15 10/08/2024    ALT 20 10/08/2024     No results found for: \"FARHANA\", " "\"RF\", \"SEDRATE\"   Lab Results   Component Value Date    CRP 1.74 (H) 2023      No results found for: \"IRON\", \"TIBC\", \"FERRITIN\"   No results found for: \"[id number]\"         Physical Exam  Constitutional:       Appearance: Normal appearance. She is well-developed. She is not diaphoretic.   HENT:      Head: Normocephalic and atraumatic.      Right Ear: Tympanic membrane, ear canal and external ear normal.      Left Ear: Tympanic membrane, ear canal and external ear normal.      Nose: Nose normal.      Mouth/Throat:      Mouth: Mucous membranes are moist.   Eyes:      General: Lids are normal.      Extraocular Movements: Extraocular movements intact.      Conjunctiva/sclera: Conjunctivae normal.      Pupils: Pupils are equal, round, and reactive to light.   Neck:      Thyroid: No thyromegaly.      Vascular: No carotid bruit or JVD.      Trachea: No tracheal deviation.   Cardiovascular:      Rate and Rhythm: Normal rate and regular rhythm.      Heart sounds: Normal heart sounds. No murmur heard.     No friction rub. No gallop.      Comments: Calves symmetric bilateral, nontender, Homans negative  Pulmonary:      Effort: Pulmonary effort is normal.      Breath sounds: Normal breath sounds. No stridor. No decreased breath sounds, wheezing or rales.   Abdominal:      General: Bowel sounds are normal. There is no distension.      Palpations: Abdomen is soft. There is no mass.      Tenderness: There is no abdominal tenderness. There is no guarding or rebound.      Hernia: No hernia is present.   Musculoskeletal:      Right lower le+ Pitting Edema present.      Left lower le+ Pitting Edema present.   Lymphadenopathy:      Head:      Right side of head: No submental, submandibular, tonsillar, preauricular, posterior auricular or occipital adenopathy.      Left side of head: No submental, submandibular, tonsillar, preauricular, posterior auricular or occipital adenopathy.      Cervical: No cervical adenopathy. "   Skin:     General: Skin is warm and dry.      Coloration: Skin is not pale.   Neurological:      Mental Status: She is alert and oriented to person, place, and time.      Cranial Nerves: No cranial nerve deficit.      Sensory: No sensory deficit.      Coordination: Coordination normal.      Gait: Gait normal.      Deep Tendon Reflexes: Reflexes are normal and symmetric.           Assessment & Plan      Medications        Problem List         LOS  Health maintenance..  Doing well, vaccines current.  pneumonia vaccine sched.  Discussed calcium vitamin D.  D iscussed coated baby aspirin daily.  Discussed health maintenance, screening test last modification.  Mammogram benign 2020, 2022, 2023, recheck.  Pap/H PV neg 2022.  Acute bacterial bronchitis.  Start antibiotics, increase fluid intake.  Call return if fever worsening symptoms.  Type II diabetes. New onset improved, A1c to 6.5, tolerating increased dose ozempic.   Discussed diet, exercise, lifestyle modification, weight loss.  Monitor BS at home .  Follow up recheck.  add lisinipril.  With neuropathy add gabapentin.  Elevated LFTs.  Improved/resolved on recheck.  Mild, new onset.  DDx includes fatty liver add fish oil.  Follow-up recheck.  Consider further eval persistent elevation.  Left leg pain evaluated with numbness/tingling.    Improved today, x-ray with mild signs of lumbar degeneration, continue as needed nighttime muscle relaxant, rehabilitation exercise discussed, nightly amitriptyline.  Follow-up recheck.  Add as needed meloxicam.  Lower extremity edema.  Flare currently secondary to recent travel to [place], had been off furosemide, back on currently.  With secondary infection start antibiotics.  Follow-up recheck renal function.  Improved today back on furosemide/potassium.  DDx includes venous insufficiency, sleep apnea.  Echo benign / with increased r sided pressure 2023..  JOAQUIN hose.  Discussed low-sodium diet.  Recommend sleep eval she agrees  today/appointment rescheduled.  Follow-up recheck..  Follow-up recheck.  Consider further eval if persistent symptoms.  Right lower extremity cellulitis.  Improved/resolved today, has completed course antibiotics.  DDx includes DVT ultrasound benign.  Follow-up recheck.  Return if fever or worsening symptoms.  Allergic rhinitis.  Over-the-counter Claritin or Zyrtec.  Umbilical hernia.  Stable today.  Followed by Dr. Diop, working on weight loss, monitoring. S/s incarceration discussed.  2.6 cm per CT scan 6/23.  Obesity.  Improved, working on diet and exercise, has lost weight.  Recommend bariatric surgery, sleep study she declines currently.   improved on ozempic.   Abnormal stress test.  Stress Cardiolite with abnormal area, subsequent echo normal.  Followed by cardiology.  Exercise has been recommended.  Thoracic back pain.  Strain.  Benign exam today.  Start as needed meloxicam.  Rehabilitation exercises discussed.  Add PRN muscle relaxer.  Follow-up recheck.  Consider imaging if persistent symptoms.  Osteoarthritis knees.  Improved today s/p cartilage ijxn, followed by ortho / follow up schef.  Improved on meloxicam as needed, improved status post recent injection.  Still with pain limiting exercise check x-rays, start PT.  Follow-up recheck.  Consider orthopedics referral, repeat injection if persistent symptoms..  symptoms worse on right.  Ice, rehabilitation exercise discussed.  Call return if worsening symptoms.  Colon cancer screening.  Recommend colonoscopy/Cologuard, Cologuard scheduled.  Shortness of breath.  Multifactorial, likely secondary to deconditioning.  Has had cardiology eval.  DDx includes COPD add inhaler.  Increase exercise.  Consider PFTs.  Follow-up recheck.  Cellulitis abdomen.  Improved today has completed course antibiotics.  Overlying hernia, complicating abrasion. Improving today, continue antibiotics/ change to bactrim.  topical care discussed.   Call / return if worsening symptoms.    Viral conjunctivitis.  Benign exam today, start antibiotics.   Call or return if worsening or persistent symptoms.  Blood in stool.  Overall benign exam today, likely secondary to hemorrhoids.  Increase fluids/fiber.  Topical care discussed.  Check hemoglobin.  Cologuard rescheduled.  Call return if worsening symptoms.  Facial numbness/transient weakness.  Clinically improved today, status post eval per ED with benign CT brain, has had benign Optho eval per her report, will get records.  DDx includes TIA, neuropathy, She stopped coated baby aspirin daily, restart, MRI brain benign, EKG benign today, Holter with short vruns onky max 7 beats, carotid Doppler With mild bilateral blockage only, echocardiogram benign /bubble study negative.  Close follow-up scheduled.  Call return if recurrent symptoms.  Constipation.  Increase fluids/fiber.  Add fiber/stool softeners.  Call return if worsening symptoms.  Acute bacterial bronchitis antibiotics.  Fluid intake.  Call return if fever or worsening symptoms.    Left lower quadrant pain/cramping.  Improved/resolved today, has completed course antibiotic.  Overall benign exam today, with, carotid palpation overall improved on fiber.  DDx includes diverticulitis, overall mild per exam, no fever, start antibiotics.  Increase fluid intake.  Consider further eval if worsening persistent symptoms.  Call return if fever, no keep fluids down, worsening symptoms.  Burning left-sided abdominal pain.  Likely neuropathic, start as needed gabapentin.  Start as needed gabapentin.  Start as needed gabapentin.  Benign exam today.  Follow-up recheck.  Nasal lesion.  Benign exam today, likely secondary infection, start topical Bactroban.  Consider HEENT referral if persistent symptoms.        Diagnoses and all orders for this visit:    1. Type 2 diabetes mellitus with diabetic polyneuropathy, without long-term current use of insulin (Primary)  -     POC Glycosylated Hemoglobin (Hb A1C)  -      Microalbumin / Creatinine Urine Ratio - Urine, Clean Catch  -     POC Urinalysis Dipstick  -     Semaglutide, 1 MG/DOSE, (Ozempic, 1 MG/DOSE,) 2 MG/1.5ML solution pen-injector; Inject 1 mg under the skin into the appropriate area as directed 1 (One) Time Per Week. Okay to Compound Semaglutide with B12 for Energy/Nutritional Support  Dispense: 3 mL; Refill: 3    2. Mixed hyperlipidemia    3. Foot swelling    4. Class 3 severe obesity due to excess calories with serious comorbidity and body mass index (BMI) of 45.0 to 49.9 in adult    5. Dysuria  -     Urine Culture - Urine, Urine, Random Void  -     cephalexin (KEFLEX) 500 MG capsule; Take 1 capsule by mouth 3 (Three) Times a Day.  Dispense: 30 capsule; Refill: 0    6. Hematuria, unspecified type  -     Urine Culture - Urine, Urine, Random Void  -     cephalexin (KEFLEX) 500 MG capsule; Take 1 capsule by mouth 3 (Three) Times a Day.  Dispense: 30 capsule; Refill: 0    7. Leg swelling  -     potassium chloride (KLOR-CON M20) 20 MEQ CR tablet; Take 1 tablet by mouth Daily.  Dispense: 30 tablet; Refill: 2    8. Cellulitis of lower extremity, unspecified laterality    Other orders  -     triamcinolone (KENALOG) 0.5 % cream; Apply 1 Application topically to the appropriate area as directed 2 (Two) Times a Day.  Dispense: 60 g; Refill: 5            Expected course, medications, and adverse effects discussed.  Call or return if worsening or persistent symptoms.  I wore protective equipment throughout this patient encounter including a mask, gloves, and eye protection.  Hand hygiene was performed before donning protective equipment and after removal when leaving the room.  The complete contents of the Assessment and Plan and Data/Labs Results as documented above have been reviewed and addressed by myself with the patient today as part of an ongoing evaluation / treatment plan.  If some of the documentation has been copied from a previous note and is unchanged it indicates  that this problem / plan has been assessed today but is stable from a previous visit and no changes have been recommended.

## 2025-05-05 ENCOUNTER — OFFICE VISIT (OUTPATIENT)
Dept: FAMILY MEDICINE CLINIC | Facility: CLINIC | Age: 60
End: 2025-05-05
Payer: MEDICAID

## 2025-05-05 VITALS
OXYGEN SATURATION: 98 % | WEIGHT: 279.6 LBS | DIASTOLIC BLOOD PRESSURE: 80 MMHG | TEMPERATURE: 98 F | RESPIRATION RATE: 18 BRPM | BODY MASS INDEX: 46.58 KG/M2 | SYSTOLIC BLOOD PRESSURE: 124 MMHG | HEART RATE: 108 BPM | HEIGHT: 65 IN

## 2025-05-05 DIAGNOSIS — M79.89 FOOT SWELLING: ICD-10-CM

## 2025-05-05 DIAGNOSIS — R31.9 HEMATURIA, UNSPECIFIED TYPE: ICD-10-CM

## 2025-05-05 DIAGNOSIS — L03.119 CELLULITIS OF LOWER EXTREMITY, UNSPECIFIED LATERALITY: ICD-10-CM

## 2025-05-05 DIAGNOSIS — E78.2 MIXED HYPERLIPIDEMIA: ICD-10-CM

## 2025-05-05 DIAGNOSIS — M79.89 LEG SWELLING: ICD-10-CM

## 2025-05-05 DIAGNOSIS — E66.813 CLASS 3 SEVERE OBESITY DUE TO EXCESS CALORIES WITH SERIOUS COMORBIDITY AND BODY MASS INDEX (BMI) OF 45.0 TO 49.9 IN ADULT: ICD-10-CM

## 2025-05-05 DIAGNOSIS — E66.01 CLASS 3 SEVERE OBESITY DUE TO EXCESS CALORIES WITH SERIOUS COMORBIDITY AND BODY MASS INDEX (BMI) OF 45.0 TO 49.9 IN ADULT: ICD-10-CM

## 2025-05-05 DIAGNOSIS — R30.0 DYSURIA: ICD-10-CM

## 2025-05-05 DIAGNOSIS — E11.42 TYPE 2 DIABETES MELLITUS WITH DIABETIC POLYNEUROPATHY, WITHOUT LONG-TERM CURRENT USE OF INSULIN: Primary | ICD-10-CM

## 2025-05-05 LAB
BILIRUB BLD-MCNC: NEGATIVE MG/DL
CLARITY, POC: ABNORMAL
COLOR UR: YELLOW
EXPIRATION DATE: ABNORMAL
GLUCOSE UR STRIP-MCNC: NEGATIVE MG/DL
HBA1C MFR BLD: 6.3 % (ref 4.5–5.7)
KETONES UR QL: NEGATIVE
LEUKOCYTE EST, POC: ABNORMAL
Lab: ABNORMAL
NITRITE UR-MCNC: POSITIVE MG/ML
PH UR: 6 [PH] (ref 5–8)
PROT UR STRIP-MCNC: NEGATIVE MG/DL
RBC # UR STRIP: ABNORMAL /UL
SP GR UR: 1.01 (ref 1–1.03)
UROBILINOGEN UR QL: NORMAL

## 2025-05-05 RX ORDER — POTASSIUM CHLORIDE 1500 MG/1
20 TABLET, EXTENDED RELEASE ORAL DAILY
Qty: 30 TABLET | Refills: 2 | Status: SHIPPED | OUTPATIENT
Start: 2025-05-05

## 2025-05-05 RX ORDER — TRIAMCINOLONE ACETONIDE 5 MG/G
1 CREAM TOPICAL 2 TIMES DAILY
Qty: 60 G | Refills: 5 | Status: SHIPPED | OUTPATIENT
Start: 2025-05-05

## 2025-05-05 RX ORDER — SEMAGLUTIDE 1.34 MG/ML
1 INJECTION, SOLUTION SUBCUTANEOUS WEEKLY
Qty: 3 ML | Refills: 3 | Status: SHIPPED | OUTPATIENT
Start: 2025-05-05

## 2025-05-05 RX ORDER — CEPHALEXIN 500 MG/1
500 CAPSULE ORAL 3 TIMES DAILY
Qty: 30 CAPSULE | Refills: 0 | Status: SHIPPED | OUTPATIENT
Start: 2025-05-05 | End: 2025-05-09

## 2025-05-06 LAB
ALBUMIN/CREAT UR: 7 MG/G CREAT (ref 0–29)
CREAT UR-MCNC: 144.4 MG/DL
MICROALBUMIN UR-MCNC: 10.2 UG/ML

## 2025-05-08 LAB
BACTERIA UR CULT: ABNORMAL
BACTERIA UR CULT: ABNORMAL
OTHER ANTIBIOTIC SUSC ISLT: ABNORMAL

## 2025-06-09 NOTE — PROGRESS NOTES
Subjective   Candice Swan is a 60 y.o. female.     Chief Complaint   Patient presents with    Foot Swelling       History of Present Illness  Foot Swelling:  Symptoms started Thursday, 5/1/2025. Patient was seen on 5/5/2025 with complaints of symptoms including bilateral leg/foot swelling, itchiness, redness.  Described as tingling and burning feeling.  Worsen by walking and pressure.  Today patient reports:  The edema has been severe.  The edema is present all day. The swelling has been aggravated by standing and walking and relieved by nothing.  Patient states she has a lot of burning and tingling in her lower extremities.           I personally reviewed and updated the patient's allergies, medications, problem list, and past medical, surgical, social, and family history. I have reviewed and confirmed the accuracy of the History of Present Illness and Review of Symptoms as documented by the MA/LPN/RN. Brandin Sunshine MD    Family History   Problem Relation Age of Onset    Diabetes Mother     Heart disease Mother        Social History     Tobacco Use    Smoking status: Never     Passive exposure: Never    Smokeless tobacco: Never   Vaping Use    Vaping status: Never Used   Substance Use Topics    Alcohol use: No    Drug use: No       Past Surgical History:   Procedure Laterality Date    CARDIOVASCULAR STRESS TEST  06/11/2019    abnormal    CHOLECYSTECTOMY      ECHO - CONVERTED  2019       Patient Active Problem List   Diagnosis    Degeneration of intervertebral disc at C5-C6 level    Numbness and tingling in left arm    Umbilical hernia    Osteoarthritis of both knees    Class 3 severe obesity due to excess calories with serious comorbidity and body mass index (BMI) of 50.0 to 59.9 in adult    Abnormal nuclear stress test    Fatigue    Allergic rhinitis    Fatty liver    Mixed hyperlipidemia    Empty sella    Cervical disc disease    Lipoma    GERD (gastroesophageal reflux disease)    Spasm eyelid    SOB  (shortness of breath)    Dysuria    Annual visit for general adult medical examination with abnormal findings    Screening for malignant neoplasm of breast    Screening for malignant neoplasm of cervix    Acute bacterial sinusitis    Seasonal allergic rhinitis due to pollen    Acute pain of left knee    Neuropathy    Screen for colon cancer    Acute left-sided thoracic back pain    TIA (transient ischemic attack)    Type 2 diabetes mellitus with neurologic complication, without long-term current use of insulin    Cellulitis of right lower extremity    ELKIN (obstructive sleep apnea)    Nasal lesion    Cellulitis of lower extremity         Current Outpatient Medications:     ASPIRIN 81 PO, Take  by mouth., Disp: , Rfl:     furosemide (LASIX) 40 MG tablet, Take 1 tablet by mouth Daily As Needed (swelling)., Disp: 90 tablet, Rfl: 3    potassium chloride (KLOR-CON M20) 20 MEQ CR tablet, Take 1 tablet by mouth Daily., Disp: 90 tablet, Rfl: 3    Semaglutide, 1 MG/DOSE, (Ozempic, 1 MG/DOSE,) 2 MG/1.5ML solution pen-injector, Inject 1 mg under the skin into the appropriate area as directed 1 (One) Time Per Week. Okay to Compound Semaglutide with B12 for Energy/Nutritional Support, Disp: 3 mL, Rfl: 3    sulfamethoxazole-trimethoprim (BACTRIM DS,SEPTRA DS) 800-160 MG per tablet, Take 1 tablet by mouth 2 (Two) Times a Day., Disp: 20 tablet, Rfl: 0    triamcinolone (KENALOG) 0.5 % cream, Apply 1 Application topically to the appropriate area as directed 2 (Two) Times a Day., Disp: 60 g, Rfl: 5    Accu-Chek Guide test strip, USE TEST STRIPS TO TEST GLUCOSE LEVELS TWICE DAILY (Patient not taking: Reported on 6/10/2025), Disp: , Rfl:     Accu-Chek Softclix Lancets lancets, USE LANCETS TO TEST GLUCOSE TWICE DAILY (Patient not taking: Reported on 6/10/2025), Disp: , Rfl:     Blood Glucose Monitoring Suppl (Accu-Chek Guide) w/Device kit, USE TO TEST BLOOD SUGARS TWICE DAILY (Patient not taking: Reported on 6/10/2025), Disp: , Rfl:      "cephalexin (KEFLEX) 500 MG capsule, Take 1 capsule by mouth 3 (Three) Times a Day., Disp: 30 capsule, Rfl: 0    gabapentin (NEURONTIN) 100 MG capsule, Take 1 capsule by mouth 3 (Three) Times a Day As Needed (knee pain). (Patient not taking: Reported on 6/10/2025), Disp: 90 capsule, Rfl: 2    lisinopril (PRINIVIL,ZESTRIL) 5 MG tablet, Take 1 tablet by mouth Daily. (Patient not taking: Reported on 6/10/2025), Disp: 90 tablet, Rfl: 3    silver sulfadiazine (SILVADENE, SSD) 1 % cream, Apply 1 Application topically to the appropriate area as directed 2 (Two) Times a Day As Needed for Wound Care., Disp: 400 g, Rfl: 2         Review of Systems   Constitutional:  Negative for chills and diaphoresis.   HENT:  Negative for trouble swallowing and voice change.    Eyes:  Negative for visual disturbance.   Respiratory:  Negative for shortness of breath.    Cardiovascular:  Negative for chest pain and palpitations.   Gastrointestinal:  Negative for abdominal pain and nausea.   Endocrine: Negative for polydipsia and polyphagia.   Genitourinary:  Negative for hematuria.   Musculoskeletal:  Negative for neck stiffness.   Skin:  Negative for color change and pallor.   Allergic/Immunologic: Negative for immunocompromised state.   Neurological:  Negative for seizures and syncope.   Hematological:  Negative for adenopathy.   Psychiatric/Behavioral:  Negative for hallucinations, sleep disturbance and suicidal ideas.        I have reviewed and confirmed the accuracy of the ROS as documented by the MA/LPN/RN Brandin Sunshine MD      Objective   /88 (BP Location: Right arm, Patient Position: Sitting, Cuff Size: Adult)   Pulse 117   Temp 98.7 °F (37.1 °C) (Temporal)   Resp 22   Ht 154.9 cm (61\")   Wt 129 kg (284 lb 12.8 oz)   LMP 08/01/2019   SpO2 97%   BMI 53.81 kg/m²   BP Readings from Last 3 Encounters:   06/10/25 122/88   05/05/25 124/80   01/24/25 148/82     Wt Readings from Last 3 Encounters:   06/10/25 129 kg (284 lb 12.8 " oz)   05/05/25 127 kg (279 lb 9.6 oz)   01/24/25 125 kg (276 lb 6.4 oz)     Physical Exam  Constitutional:       Appearance: Normal appearance. She is well-developed. She is not diaphoretic.   HENT:      Head: Normocephalic and atraumatic.      Right Ear: Hearing, tympanic membrane, ear canal and external ear normal.      Left Ear: Hearing, tympanic membrane, ear canal and external ear normal.      Nose: Nose normal. No mucosal edema or congestion.      Right Sinus: No maxillary sinus tenderness or frontal sinus tenderness.      Left Sinus: No maxillary sinus tenderness or frontal sinus tenderness.      Mouth/Throat:      Mouth: Mucous membranes are moist. No oral lesions.      Pharynx: Uvula midline. No oropharyngeal exudate or posterior oropharyngeal erythema.      Tonsils: No tonsillar exudate.   Eyes:      General: Lids are normal.      Extraocular Movements: Extraocular movements intact.      Conjunctiva/sclera: Conjunctivae normal.      Pupils: Pupils are equal, round, and reactive to light.   Neck:      Thyroid: No thyromegaly.      Vascular: No carotid bruit or JVD.      Trachea: No tracheal deviation.   Cardiovascular:      Rate and Rhythm: Normal rate and regular rhythm.      Heart sounds: Normal heart sounds. No murmur heard.     No friction rub. No gallop.   Pulmonary:      Effort: Pulmonary effort is normal.      Breath sounds: Normal breath sounds. No stridor. No decreased breath sounds, wheezing or rales.   Abdominal:      General: Bowel sounds are normal. There is no distension.      Palpations: Abdomen is soft. There is no mass.      Tenderness: There is no abdominal tenderness. There is no guarding or rebound.      Hernia: No hernia is present.   Lymphadenopathy:      Head:      Right side of head: No submental, submandibular, tonsillar, preauricular, posterior auricular or occipital adenopathy.      Left side of head: No submental, submandibular, tonsillar, preauricular, posterior auricular or  "occipital adenopathy.      Cervical: No cervical adenopathy.   Skin:     General: Skin is warm and dry.      Coloration: Skin is not pale.   Neurological:      Mental Status: She is alert and oriented to person, place, and time.      Cranial Nerves: No cranial nerve deficit.      Sensory: No sensory deficit.      Coordination: Coordination normal.      Gait: Gait normal.      Deep Tendon Reflexes: Reflexes are normal and symmetric.         Data / Lab Results:    Hemoglobin A1C   Date Value Ref Range Status   05/05/2025 6.3 (A) 4.5 - 5.7 % Final   01/24/2025 6.1 (A) 4.5 - 5.7 % Final   09/17/2024 5.8 (A) 4.5 - 5.7 % Final     Lab Results   Component Value Date    Glucose 117 (H) 06/11/2025    Glucose 112 06/26/2024    Glucose, UA Negative 05/05/2025     Lab Results   Component Value Date    LDL 81 10/08/2024    LDL 84 10/02/2023    LDL 73 09/06/2022     Lab Results   Component Value Date    CHOL 129 05/02/2019    CHOL 136 11/01/2016     Lab Results   Component Value Date    TRIG 102 10/08/2024    TRIG 97 10/02/2023    TRIG 79 09/06/2022     Lab Results   Component Value Date    HDL 55 10/08/2024    HDL 51 10/02/2023    HDL 51 09/06/2022     No results found for: \"PSA\"  Lab Results   Component Value Date    WBC 6.6 06/11/2025    HGB 14.3 06/11/2025    HCT 45.9 06/11/2025    MCV 95 06/11/2025     06/11/2025     Lab Results   Component Value Date    TSH 3.350 10/08/2024      Lab Results   Component Value Date    GLUCOSE 117 (H) 06/11/2025    BUN 13 06/11/2025    CREATININE 0.69 06/11/2025    EGFRIFNONA 108 05/02/2019    EGFRIFAFRI 126 05/02/2019    BCR 19 06/11/2025    K 4.7 06/11/2025    CO2 24 06/11/2025    CALCIUM 9.7 06/11/2025    ALBUMIN 4.2 10/08/2024    AST 15 10/08/2024    ALT 20 10/08/2024     No results found for: \"FARHANA\", \"RF\", \"SEDRATE\"   Lab Results   Component Value Date    CRP 1.74 (H) 08/08/2023      No results found for: \"IRON\", \"TIBC\", \"FERRITIN\"   No results found for: \"HVGKHSVC16\" "       Assessment & Plan      Medications        Problem List         LOS  Health maintenance..  Doing well, vaccines current.  pneumonia vaccine sched.  Discussed calcium vitamin D.  D iscussed coated baby aspirin daily.  Discussed health maintenance, screening test last modification.  Mammogram benign 2020, 2022, 2023, recheck.  Pap/H PV neg 2022.  Acute bacterial sinusitis.  Start antibiotics, increase fluid intake.  Call return if fever worsening symptoms.  Type II diabetes. New onset improved, A1c to 6.8, tolerating increased dose ozempic.   Discussed diet, exercise, lifestyle modification, weight loss.  Monitor BS at home .  Follow up recheck.  add lisinipril.  With neuropathy add gabapentin.  Elevated LFTs.  Improved/resolved on recheck.  Mild, new onset.  DDx includes fatty liver add fish oil.  Follow-up recheck.  Consider further eval persistent elevation.  Left leg pain evaluated with numbness/tingling.    Improved today, x-ray with mild signs of lumbar degeneration, continue as needed nighttime muscle relaxant, rehabilitation exercise discussed, nightly amitriptyline.  Follow-up recheck.  Add as needed meloxicam.  Lower extremity edema.   Improved today back on furosemide/potassium, fraction resolved, recheck renal function.  DDx includes venous insufficiency, sleep apnea.  Echo benign / with increased r sided pressure 2023..  JOAQUIN hose.  Discussed low-sodium diet.  Long discussion again today recommend sleep eval she agrees today/appointment rescheduled.  Follow-up recheck..  Follow-up recheck.  Consider further eval if persistent symptoms.  Right lower extremity cellulitis.  Improved/resolved today, has completed course antibiotics.  DDx includes DVT ultrasound benign.  Follow-up recheck.  Return if fever or worsening symptoms.  Allergic rhinitis.  Over-the-counter Claritin or Zyrtec.  Umbilical hernia.  Stable today.  Followed by Dr. Diop, working on weight loss, monitoring. S/s incarceration discussed.   2.6 cm per CT scan 6/23.  Obesity.  Improved, working on diet and exercise, has lost weight.  Recommend bariatric surgery, sleep study she declines currently.   improved on ozempic.   Abnormal stress test.  Stress Cardiolite with abnormal area, subsequent echo normal.  Followed by cardiology.  Exercise has been recommended.  Thoracic back pain.  Strain.  Benign exam today.  Start as needed meloxicam.  Rehabilitation exercises discussed.  Add PRN muscle relaxer.  Follow-up recheck.  Consider imaging if persistent symptoms.  Osteoarthritis knees.  Improved today s/p cartilage ijxn, followed by ortho / follow up schef.  Improved on meloxicam as needed, improved status post recent injection.  Still with pain limiting exercise check x-rays, start PT.  Follow-up recheck.  Consider orthopedics referral, repeat injection if persistent symptoms..  symptoms worse on right.  Ice, rehabilitation exercise discussed.  Call return if worsening symptoms.  Colon cancer screening.  Recommend colonoscopy/Cologuard, Cologuard scheduled.  Shortness of breath.  Multifactorial, likely secondary to deconditioning.  Has had cardiology eval.  DDx includes COPD add inhaler.  Increase exercise.  Consider PFTs.  Follow-up recheck.  Cellulitis abdomen.  Improved today has completed course antibiotics.  Overlying hernia, complicating abrasion. Improving today, continue antibiotics/ change to bactrim.  topical care discussed.   Call / return if worsening symptoms.   Viral conjunctivitis.  Benign exam today, start antibiotics.   Call or return if worsening or persistent symptoms.  Blood in stool.  Overall benign exam today, likely secondary to hemorrhoids.  Increase fluids/fiber.  Topical care discussed.  Check hemoglobin.  Cologuard rescheduled.  Call return if worsening symptoms.  Facial numbness/transient weakness.  Clinically improved today, status post eval per ED with benign CT brain, has had benign Optho eval per her report, will get  records.  DDx includes TIA, neuropathy, She stopped coated baby aspirin daily, restart, MRI brain benign, EKG benign today, Holter with short vruns onky max 7 beats, carotid Doppler With mild bilateral blockage only, echocardiogram benign /bubble study negative.  Close follow-up scheduled.  Call return if recurrent symptoms.  Constipation.  Increase fluids/fiber.  Add fiber/stool softeners.  Call return if worsening symptoms.  Acute bacterial bronchitis antibiotics.  Fluid intake.  Call return if fever or worsening symptoms.    Left lower quadrant pain/cramping.  Improved/resolved today, has completed course antibiotic.  Overall benign exam today, with, carotid palpation overall improved on fiber.  DDx includes diverticulitis, overall mild per exam, no fever, start antibiotics.  Increase fluid intake.  Consider further eval if worsening persistent symptoms.  Call return if fever, no keep fluids down, worsening symptoms.  Burning left-sided abdominal pain.  Likely neuropathic, start as needed gabapentin.  Start as needed gabapentin.  Start as needed gabapentin.  Benign exam today.  Follow-up recheck.  Nasal lesion.  Benign exam today, likely secondary infection, start topical Bactroban.  Consider HEENT referral if persistent symptoms.        Diagnoses and all orders for this visit:    1. Foot swelling (Primary)  -     CBC & Differential  -     Basic Metabolic Panel    2. Class 3 severe obesity due to excess calories with serious comorbidity and body mass index (BMI) of 50.0 to 59.9 in adult    3. Other insomnia  -     Ambulatory Referral to Sleep Medicine    4. Type 2 diabetes mellitus with diabetic polyneuropathy, without long-term current use of insulin  -     CBC & Differential  -     Basic Metabolic Panel    5. Leg swelling  -     CBC & Differential  -     Basic Metabolic Panel  -     furosemide (LASIX) 40 MG tablet; Take 1 tablet by mouth Daily As Needed (swelling).  Dispense: 90 tablet; Refill: 3  -      potassium chloride (KLOR-CON M20) 20 MEQ CR tablet; Take 1 tablet by mouth Daily.  Dispense: 90 tablet; Refill: 3    6. Left ankle swelling  -     furosemide (LASIX) 40 MG tablet; Take 1 tablet by mouth Daily As Needed (swelling).  Dispense: 90 tablet; Refill: 3    7. Cellulitis of other specified site  -     cephalexin (KEFLEX) 500 MG capsule; Take 1 capsule by mouth 3 (Three) Times a Day.  Dispense: 30 capsule; Refill: 0  -     silver sulfadiazine (SILVADENE, SSD) 1 % cream; Apply 1 Application topically to the appropriate area as directed 2 (Two) Times a Day As Needed for Wound Care.  Dispense: 400 g; Refill: 2              Expected course, medications, and adverse effects discussed.  Call or return if worsening or persistent symptoms.  I wore protective equipment throughout this patient encounter including a mask, gloves, and eye protection.  Hand hygiene was performed before donning protective equipment and after removal when leaving the room. The complete contents of the Assessment and Plan and Data/Lab Results as documented above have been reviewed and addressed by myself with the patient today as part of an ongoing evaluation / treatment plan.  If some of the documentation has been copied from a previous note and is unchanged it indicates that this problem / plan has been assessed today but is stable from a previous visit and no changes have been recommended.

## 2025-06-10 ENCOUNTER — OFFICE VISIT (OUTPATIENT)
Dept: FAMILY MEDICINE CLINIC | Facility: CLINIC | Age: 60
End: 2025-06-10
Payer: OTHER GOVERNMENT

## 2025-06-10 VITALS
SYSTOLIC BLOOD PRESSURE: 122 MMHG | HEIGHT: 61 IN | RESPIRATION RATE: 22 BRPM | OXYGEN SATURATION: 97 % | BODY MASS INDEX: 53.77 KG/M2 | WEIGHT: 284.8 LBS | HEART RATE: 117 BPM | DIASTOLIC BLOOD PRESSURE: 88 MMHG | TEMPERATURE: 98.7 F

## 2025-06-10 DIAGNOSIS — E66.813 CLASS 3 SEVERE OBESITY DUE TO EXCESS CALORIES WITH SERIOUS COMORBIDITY AND BODY MASS INDEX (BMI) OF 50.0 TO 59.9 IN ADULT: ICD-10-CM

## 2025-06-10 DIAGNOSIS — G47.09 OTHER INSOMNIA: ICD-10-CM

## 2025-06-10 DIAGNOSIS — E66.01 CLASS 3 SEVERE OBESITY DUE TO EXCESS CALORIES WITH SERIOUS COMORBIDITY AND BODY MASS INDEX (BMI) OF 50.0 TO 59.9 IN ADULT: ICD-10-CM

## 2025-06-10 DIAGNOSIS — M25.472 LEFT ANKLE SWELLING: ICD-10-CM

## 2025-06-10 DIAGNOSIS — L03.818 CELLULITIS OF OTHER SPECIFIED SITE: ICD-10-CM

## 2025-06-10 DIAGNOSIS — E11.42 TYPE 2 DIABETES MELLITUS WITH DIABETIC POLYNEUROPATHY, WITHOUT LONG-TERM CURRENT USE OF INSULIN: ICD-10-CM

## 2025-06-10 DIAGNOSIS — M79.89 LEG SWELLING: ICD-10-CM

## 2025-06-10 DIAGNOSIS — M79.89 FOOT SWELLING: Primary | ICD-10-CM

## 2025-06-10 PROCEDURE — 3044F HG A1C LEVEL LT 7.0%: CPT | Performed by: FAMILY MEDICINE

## 2025-06-10 PROCEDURE — 99214 OFFICE O/P EST MOD 30 MIN: CPT | Performed by: FAMILY MEDICINE

## 2025-06-10 PROCEDURE — 1125F AMNT PAIN NOTED PAIN PRSNT: CPT | Performed by: FAMILY MEDICINE

## 2025-06-10 RX ORDER — FUROSEMIDE 40 MG/1
40 TABLET ORAL DAILY PRN
Qty: 90 TABLET | Refills: 3 | Status: SHIPPED | OUTPATIENT
Start: 2025-06-10

## 2025-06-10 RX ORDER — CEPHALEXIN 500 MG/1
500 CAPSULE ORAL 3 TIMES DAILY
Qty: 30 CAPSULE | Refills: 0 | Status: SHIPPED | OUTPATIENT
Start: 2025-06-10

## 2025-06-10 RX ORDER — SILVER SULFADIAZINE 10 MG/G
1 CREAM TOPICAL 2 TIMES DAILY PRN
Qty: 400 G | Refills: 2 | Status: SHIPPED | OUTPATIENT
Start: 2025-06-10

## 2025-06-10 RX ORDER — POTASSIUM CHLORIDE 1500 MG/1
20 TABLET, EXTENDED RELEASE ORAL DAILY
Qty: 90 TABLET | Refills: 3 | Status: SHIPPED | OUTPATIENT
Start: 2025-06-10

## 2025-06-12 LAB
BASOPHILS # BLD AUTO: 0 X10E3/UL (ref 0–0.2)
BASOPHILS NFR BLD AUTO: 1 %
BUN SERPL-MCNC: 13 MG/DL (ref 6–24)
BUN/CREAT SERPL: 19 (ref 9–23)
CALCIUM SERPL-MCNC: 9.7 MG/DL (ref 8.7–10.2)
CHLORIDE SERPL-SCNC: 103 MMOL/L (ref 96–106)
CO2 SERPL-SCNC: 24 MMOL/L (ref 20–29)
CREAT SERPL-MCNC: 0.69 MG/DL (ref 0.57–1)
EGFRCR SERPLBLD CKD-EPI 2021: 100 ML/MIN/1.73
EOSINOPHIL # BLD AUTO: 0.2 X10E3/UL (ref 0–0.4)
EOSINOPHIL NFR BLD AUTO: 3 %
ERYTHROCYTE [DISTWIDTH] IN BLOOD BY AUTOMATED COUNT: 13.1 % (ref 11.7–15.4)
GLUCOSE SERPL-MCNC: 117 MG/DL (ref 70–99)
HCT VFR BLD AUTO: 45.9 % (ref 34–46.6)
HGB BLD-MCNC: 14.3 G/DL (ref 11.1–15.9)
IMM GRANULOCYTES # BLD AUTO: 0 X10E3/UL (ref 0–0.1)
IMM GRANULOCYTES NFR BLD AUTO: 0 %
LYMPHOCYTES # BLD AUTO: 2.1 X10E3/UL (ref 0.7–3.1)
LYMPHOCYTES NFR BLD AUTO: 33 %
MCH RBC QN AUTO: 29.5 PG (ref 26.6–33)
MCHC RBC AUTO-ENTMCNC: 31.2 G/DL (ref 31.5–35.7)
MCV RBC AUTO: 95 FL (ref 79–97)
MONOCYTES # BLD AUTO: 0.5 X10E3/UL (ref 0.1–0.9)
MONOCYTES NFR BLD AUTO: 8 %
NEUTROPHILS # BLD AUTO: 3.7 X10E3/UL (ref 1.4–7)
NEUTROPHILS NFR BLD AUTO: 55 %
PLATELET # BLD AUTO: 261 X10E3/UL (ref 150–450)
POTASSIUM SERPL-SCNC: 4.7 MMOL/L (ref 3.5–5.2)
RBC # BLD AUTO: 4.85 X10E6/UL (ref 3.77–5.28)
SODIUM SERPL-SCNC: 141 MMOL/L (ref 134–144)
WBC # BLD AUTO: 6.6 X10E3/UL (ref 3.4–10.8)

## 2025-07-01 ENCOUNTER — RESULTS FOLLOW-UP (OUTPATIENT)
Dept: FAMILY MEDICINE CLINIC | Facility: CLINIC | Age: 60
End: 2025-07-01
Payer: OTHER GOVERNMENT